# Patient Record
Sex: FEMALE | Race: WHITE | NOT HISPANIC OR LATINO | Employment: FULL TIME | URBAN - METROPOLITAN AREA
[De-identification: names, ages, dates, MRNs, and addresses within clinical notes are randomized per-mention and may not be internally consistent; named-entity substitution may affect disease eponyms.]

---

## 2019-09-13 ENCOUNTER — HOSPITAL ENCOUNTER (OUTPATIENT)
Dept: MEDSURG UNIT | Facility: HOSPITAL | Age: 34
End: 2019-09-15
Attending: INTERNAL MEDICINE | Admitting: INTERNAL MEDICINE

## 2019-09-13 LAB
ABS NEUT (OLG): 15.9 X10(3)/MCL (ref 1.5–6.9)
ALBUMIN SERPL-MCNC: 4 GM/DL (ref 3.4–5)
ALBUMIN/GLOB SERPL: 1 RATIO
ALP SERPL-CCNC: 92 UNIT/L (ref 30–113)
ALT SERPL-CCNC: 20 UNIT/L (ref 10–45)
AMPHET UR QL SCN: NORMAL
APPEARANCE, UA: CLEAR
APTT PPP: 27.1 SECOND(S) (ref 25–35)
AST SERPL-CCNC: 13 UNIT/L (ref 15–37)
B-HCG SERPL QL: NEGATIVE
BACTERIA SPEC CULT: NORMAL /HPF
BARBITURATE SCN PRESENT UR: NORMAL
BASOPHILS # BLD AUTO: 0 X10(3)/MCL (ref 0–0.1)
BASOPHILS NFR BLD AUTO: 0 % (ref 0–1)
BENZODIAZ UR QL SCN: NORMAL
BILIRUB SERPL-MCNC: 0.6 MG/DL (ref 0.1–0.9)
BILIRUB UR QL STRIP: NEGATIVE
BILIRUBIN DIRECT+TOT PNL SERPL-MCNC: 0.1 MG/DL (ref 0–0.3)
BILIRUBIN DIRECT+TOT PNL SERPL-MCNC: 0.5 MG/DL
BUN SERPL-MCNC: 11 MG/DL (ref 10–20)
CALCIUM SERPL-MCNC: 9.2 MG/DL (ref 8–10.5)
CANNABINOIDS UR QL SCN: NORMAL
CHLORIDE SERPL-SCNC: 101 MMOL/L (ref 100–108)
CO2 SERPL-SCNC: 25 MMOL/L (ref 21–35)
COCAINE UR QL SCN: NORMAL
COLOR UR: YELLOW
CREAT SERPL-MCNC: 0.8 MG/DL (ref 0.7–1.3)
EOSINOPHIL # BLD AUTO: 0 X10(3)/MCL (ref 0–0.6)
EOSINOPHIL NFR BLD AUTO: 0 % (ref 0–5)
ERYTHROCYTE [DISTWIDTH] IN BLOOD BY AUTOMATED COUNT: 11.9 % (ref 11.5–17)
GLOBULIN SER-MCNC: 4.1 GM/DL
GLUCOSE (UA): NEGATIVE
GLUCOSE SERPL-MCNC: 113 MG/DL (ref 75–116)
HCT VFR BLD AUTO: 41.9 % (ref 36–48)
HGB BLD-MCNC: 13.8 GM/DL (ref 12–16)
HGB UR QL STRIP: ABNORMAL
IMM GRANULOCYTES # BLD AUTO: 0.08 10*3/UL (ref 0–0.02)
IMM GRANULOCYTES NFR BLD AUTO: 0.4 % (ref 0–0.43)
INR PPP: 1 (ref 0–1.2)
KETONES UR QL STRIP: 15 MG/DL
LEUKOCYTE ESTERASE UR QL STRIP: NEGATIVE
LYMPHOCYTES # BLD AUTO: 1.3 X10(3)/MCL (ref 0.5–4.1)
LYMPHOCYTES NFR BLD AUTO: 7 % (ref 15–40)
MCH RBC QN AUTO: 30 PG (ref 27–34)
MCHC RBC AUTO-ENTMCNC: 33 GM/DL (ref 31–36)
MCV RBC AUTO: 92 FL (ref 80–99)
MDMA UR QL SCN: NORMAL
METHADONE UR QL SCN: NORMAL
MONOCYTES # BLD AUTO: 0.7 X10(3)/MCL (ref 0–1.1)
MONOCYTES NFR BLD AUTO: 4 % (ref 4–12)
NEUTROPHILS # BLD AUTO: 15.9 X10(3)/MCL (ref 1.5–6.9)
NEUTROPHILS NFR BLD AUTO: 88 % (ref 43–75)
NITRITE UR QL STRIP: NEGATIVE
OPIATES UR QL SCN: NORMAL
PCP UR QL: NORMAL
PH UR STRIP.AUTO: 6 [PH] (ref 5–8)
PH UR STRIP: 6 [PH]
PLATELET # BLD AUTO: 460 X10(3)/MCL (ref 140–400)
PMV BLD AUTO: 9.8 FL (ref 6.8–10)
POTASSIUM SERPL-SCNC: 3.9 MMOL/L (ref 3.6–5.2)
PROT SERPL-MCNC: 8.1 GM/DL (ref 6.4–8.2)
PROT UR QL STRIP: NEGATIVE
PROTHROMBIN TIME: 10.5 SECOND(S) (ref 9–12)
RBC # BLD AUTO: 4.54 X10(6)/MCL (ref 4.2–5.4)
RBC #/AREA URNS HPF: NORMAL /HPF
SODIUM SERPL-SCNC: 137 MMOL/L (ref 135–145)
SP GR UR STRIP: 1.02
SQUAMOUS EPITHELIAL, UA: NORMAL /LPF
TEMPERATURE, URINE (OHS): 25 DEGC (ref 20–25)
UROBILINOGEN UR STRIP-ACNC: 0.2 EU/DL
WBC # SPEC AUTO: 18 X10(3)/MCL (ref 4.5–11.5)
WBC #/AREA URNS HPF: NORMAL /[HPF]

## 2019-09-14 LAB
ABS NEUT (OLG): 8.8 X10(3)/MCL (ref 1.5–6.9)
BASOPHILS # BLD AUTO: 0 X10(3)/MCL (ref 0–0.1)
BASOPHILS NFR BLD AUTO: 0 % (ref 0–1)
BUN SERPL-MCNC: 9 MG/DL (ref 10–20)
CALCIUM SERPL-MCNC: 8.3 MG/DL (ref 8–10.5)
CHLORIDE SERPL-SCNC: 108 MMOL/L (ref 100–108)
CO2 SERPL-SCNC: 26 MMOL/L (ref 21–35)
CREAT SERPL-MCNC: 1.14 MG/DL (ref 0.7–1.3)
CREAT/UREA NIT SERPL: 8
EOSINOPHIL # BLD AUTO: 0.1 X10(3)/MCL (ref 0–0.6)
EOSINOPHIL NFR BLD AUTO: 0 % (ref 0–5)
ERYTHROCYTE [DISTWIDTH] IN BLOOD BY AUTOMATED COUNT: 12 % (ref 11.5–17)
GLUCOSE SERPL-MCNC: 95 MG/DL (ref 75–116)
HCT VFR BLD AUTO: 37.2 % (ref 36–48)
HGB BLD-MCNC: 12 GM/DL (ref 12–16)
IMM GRANULOCYTES # BLD AUTO: 0.04 10*3/UL (ref 0–0.02)
IMM GRANULOCYTES NFR BLD AUTO: 0.3 % (ref 0–0.43)
LYMPHOCYTES # BLD AUTO: 1.8 X10(3)/MCL (ref 0.5–4.1)
LYMPHOCYTES NFR BLD AUTO: 15 % (ref 15–40)
MAGNESIUM SERPL-MCNC: 1.9 MG/DL (ref 1.8–2.4)
MCH RBC QN AUTO: 30 PG (ref 27–34)
MCHC RBC AUTO-ENTMCNC: 32 GM/DL (ref 31–36)
MCV RBC AUTO: 95 FL (ref 80–99)
MONOCYTES # BLD AUTO: 1 X10(3)/MCL (ref 0–1.1)
MONOCYTES NFR BLD AUTO: 9 % (ref 4–12)
NEUTROPHILS # BLD AUTO: 8.8 X10(3)/MCL (ref 1.5–6.9)
NEUTROPHILS NFR BLD AUTO: 75 % (ref 43–75)
PLATELET # BLD AUTO: 347 X10(3)/MCL (ref 140–400)
PMV BLD AUTO: 10 FL (ref 6.8–10)
POTASSIUM SERPL-SCNC: 3.5 MMOL/L (ref 3.6–5.2)
RBC # BLD AUTO: 3.93 X10(6)/MCL (ref 4.2–5.4)
SODIUM SERPL-SCNC: 142 MMOL/L (ref 135–145)
WBC # SPEC AUTO: 11.7 X10(3)/MCL (ref 4.5–11.5)

## 2019-09-15 LAB
ABS NEUT (OLG): 6.1 X10(3)/MCL (ref 1.5–6.9)
BASOPHILS # BLD AUTO: 0.1 X10(3)/MCL (ref 0–0.1)
BASOPHILS NFR BLD AUTO: 1 % (ref 0–1)
EOSINOPHIL # BLD AUTO: 0.2 X10(3)/MCL (ref 0–0.6)
EOSINOPHIL NFR BLD AUTO: 2 % (ref 0–5)
ERYTHROCYTE [DISTWIDTH] IN BLOOD BY AUTOMATED COUNT: 12.1 % (ref 11.5–17)
HCT VFR BLD AUTO: 35.4 % (ref 36–48)
HGB BLD-MCNC: 11.3 GM/DL (ref 12–16)
IMM GRANULOCYTES # BLD AUTO: 0.03 10*3/UL (ref 0–0.02)
IMM GRANULOCYTES NFR BLD AUTO: 0.3 % (ref 0–0.43)
LYMPHOCYTES # BLD AUTO: 2.3 X10(3)/MCL (ref 0.5–4.1)
LYMPHOCYTES NFR BLD AUTO: 24 % (ref 15–40)
MCH RBC QN AUTO: 30 PG (ref 27–34)
MCHC RBC AUTO-ENTMCNC: 32 GM/DL (ref 31–36)
MCV RBC AUTO: 95 FL (ref 80–99)
MONOCYTES # BLD AUTO: 0.7 X10(3)/MCL (ref 0–1.1)
MONOCYTES NFR BLD AUTO: 8 % (ref 4–12)
NEUTROPHILS # BLD AUTO: 6.1 X10(3)/MCL (ref 1.5–6.9)
NEUTROPHILS NFR BLD AUTO: 65 % (ref 43–75)
PLATELET # BLD AUTO: 336 X10(3)/MCL (ref 140–400)
PMV BLD AUTO: 9.7 FL (ref 6.8–10)
RBC # BLD AUTO: 3.71 X10(6)/MCL (ref 4.2–5.4)
WBC # SPEC AUTO: 9.4 X10(3)/MCL (ref 4.5–11.5)

## 2021-06-07 LAB
ABS NEUT (OLG): 10.5 X10(3)/MCL (ref 1.5–6.9)
B-HCG SERPL QL: NEGATIVE
BUN SERPL-MCNC: 11 MG/DL (ref 7–18.7)
CALCIUM SERPL-MCNC: 9.3 MG/DL (ref 8.4–10.2)
CHLORIDE SERPL-SCNC: 106 MMOL/L (ref 98–107)
CO2 SERPL-SCNC: 27 MMOL/L (ref 22–29)
CREAT SERPL-MCNC: 0.66 MG/DL (ref 0.55–1.02)
CREAT/UREA NIT SERPL: 17
ERYTHROCYTE [DISTWIDTH] IN BLOOD BY AUTOMATED COUNT: 12.9 % (ref 11.5–17)
GLUCOSE SERPL-MCNC: 83 MG/DL (ref 74–100)
GROUP & RH: NORMAL
HCT VFR BLD AUTO: 37.3 % (ref 36–48)
HGB BLD-MCNC: 11.9 GM/DL (ref 12–16)
MCH RBC QN AUTO: 30 PG (ref 27–34)
MCHC RBC AUTO-ENTMCNC: 32 GM/DL (ref 31–36)
MCV RBC AUTO: 93 FL (ref 80–99)
PLATELET # BLD AUTO: 487 X10(3)/MCL (ref 140–400)
PMV BLD AUTO: 9.7 FL (ref 6.8–10)
POTASSIUM SERPL-SCNC: 4.3 MMOL/L (ref 3.5–5.1)
RBC # BLD AUTO: 4.02 X10(6)/MCL (ref 4.2–5.4)
SARS-COV-2 AG RESP QL IA.RAPID: NOT DETECTED
SODIUM SERPL-SCNC: 140 MMOL/L (ref 136–145)
WBC # SPEC AUTO: 13.4 X10(3)/MCL (ref 4.5–11.5)

## 2021-06-08 ENCOUNTER — HISTORICAL (OUTPATIENT)
Dept: ANESTHESIOLOGY | Facility: HOSPITAL | Age: 36
End: 2021-06-08

## 2021-06-08 LAB
ABS NEUT (OLG): 17.3 X10(3)/MCL (ref 1.5–6.9)
BASOPHILS # BLD AUTO: 0 X10(3)/MCL (ref 0–0.1)
BASOPHILS NFR BLD AUTO: 0 % (ref 0–1)
ERYTHROCYTE [DISTWIDTH] IN BLOOD BY AUTOMATED COUNT: 12.9 % (ref 11.5–17)
HCT VFR BLD AUTO: 32.3 % (ref 36–48)
HGB BLD-MCNC: 10.3 GM/DL (ref 12–16)
IMM GRANULOCYTES # BLD AUTO: 0.1 10*3/UL (ref 0–0.02)
IMM GRANULOCYTES NFR BLD AUTO: 0.5 % (ref 0–0.43)
LYMPHOCYTES # BLD AUTO: 0.4 X10(3)/MCL (ref 0.5–4.1)
LYMPHOCYTES NFR BLD AUTO: 2 % (ref 15–40)
MCH RBC QN AUTO: 30 PG (ref 27–34)
MCHC RBC AUTO-ENTMCNC: 32 GM/DL (ref 31–36)
MCV RBC AUTO: 94 FL (ref 80–99)
MONOCYTES # BLD AUTO: 0.3 X10(3)/MCL (ref 0–1.1)
MONOCYTES NFR BLD AUTO: 2 % (ref 4–12)
NEUTROPHILS # BLD AUTO: 17.3 X10(3)/MCL (ref 1.5–6.9)
NEUTROPHILS NFR BLD AUTO: 95 % (ref 43–75)
PLATELET # BLD AUTO: 389 X10(3)/MCL (ref 140–400)
PMV BLD AUTO: 9.5 FL (ref 6.8–10)
RBC # BLD AUTO: 3.45 X10(6)/MCL (ref 4.2–5.4)
WBC # SPEC AUTO: 18.2 X10(3)/MCL (ref 4.5–11.5)

## 2021-08-12 ENCOUNTER — HISTORICAL (OUTPATIENT)
Dept: LAB | Facility: HOSPITAL | Age: 36
End: 2021-08-12

## 2021-08-12 LAB — SARS-COV-2 AG RESP QL IA.RAPID: NOT DETECTED

## 2021-10-18 ENCOUNTER — HISTORICAL (OUTPATIENT)
Dept: ADMINISTRATIVE | Facility: HOSPITAL | Age: 36
End: 2021-10-18

## 2021-10-18 LAB
ABS NEUT (OLG): 6.23 X10(3)/MCL (ref 2.1–9.2)
ALBUMIN SERPL-MCNC: 4.1 GM/DL (ref 3.5–5)
ALBUMIN/GLOB SERPL: 1.4 RATIO (ref 1.1–2)
ALP SERPL-CCNC: 86 UNIT/L (ref 40–150)
ALT SERPL-CCNC: 20 UNIT/L (ref 0–55)
AST SERPL-CCNC: 17 UNIT/L (ref 5–34)
BASOPHILS # BLD AUTO: 0 X10(3)/MCL (ref 0–0.2)
BASOPHILS NFR BLD AUTO: 0.5 %
BILIRUB SERPL-MCNC: 0.2 MG/DL
BILIRUBIN DIRECT+TOT PNL SERPL-MCNC: 0.1 MG/DL (ref 0–0.5)
BILIRUBIN DIRECT+TOT PNL SERPL-MCNC: 0.1 MG/DL (ref 0–0.8)
BUN SERPL-MCNC: 12.5 MG/DL (ref 7–18.7)
CALCIUM SERPL-MCNC: 9.7 MG/DL (ref 8.7–10.5)
CANCER AG125 SERPL-ACNC: 22.1 UNIT/ML (ref 0–35)
CHLORIDE SERPL-SCNC: 109 MMOL/L (ref 98–107)
CO2 SERPL-SCNC: 25 MMOL/L (ref 22–29)
CREAT SERPL-MCNC: 0.79 MG/DL (ref 0.55–1.02)
EOSINOPHIL # BLD AUTO: 0.2 X10(3)/MCL (ref 0–0.9)
EOSINOPHIL NFR BLD AUTO: 2.3 %
ERYTHROCYTE [DISTWIDTH] IN BLOOD BY AUTOMATED COUNT: 12.2 % (ref 11.5–17)
GLOBULIN SER-MCNC: 2.9 GM/DL (ref 2.4–3.5)
GLUCOSE SERPL-MCNC: 125 MG/DL (ref 74–100)
HCT VFR BLD AUTO: 40.7 % (ref 37–47)
HGB BLD-MCNC: 12.9 GM/DL (ref 12–16)
LYMPHOCYTES # BLD AUTO: 1.6 X10(3)/MCL (ref 0.6–4.6)
LYMPHOCYTES NFR BLD AUTO: 18.6 %
MCH RBC QN AUTO: 29.7 PG (ref 27–31)
MCHC RBC AUTO-ENTMCNC: 31.7 GM/DL (ref 33–36)
MCV RBC AUTO: 93.8 FL (ref 80–94)
MONOCYTES # BLD AUTO: 0.4 X10(3)/MCL (ref 0.1–1.3)
MONOCYTES NFR BLD AUTO: 5.3 %
NEUTROPHILS # BLD AUTO: 6.2 X10(3)/MCL (ref 2.1–9.2)
NEUTROPHILS NFR BLD AUTO: 73.1 %
PLATELET # BLD AUTO: 361 X10(3)/MCL (ref 130–400)
PMV BLD AUTO: 10.3 FL (ref 9.4–12.4)
POTASSIUM SERPL-SCNC: 4 MMOL/L (ref 3.5–5.1)
PROT SERPL-MCNC: 7 GM/DL (ref 6.4–8.3)
RBC # BLD AUTO: 4.34 X10(6)/MCL (ref 4.2–5.4)
SODIUM SERPL-SCNC: 146 MMOL/L (ref 136–145)
WBC # SPEC AUTO: 8.5 X10(3)/MCL (ref 4.5–11.5)

## 2021-10-27 ENCOUNTER — HISTORICAL (OUTPATIENT)
Dept: INFUSION THERAPY | Facility: HOSPITAL | Age: 36
End: 2021-10-27

## 2021-11-03 ENCOUNTER — HISTORICAL (OUTPATIENT)
Dept: HEMATOLOGY/ONCOLOGY | Facility: CLINIC | Age: 36
End: 2021-11-03

## 2021-11-03 LAB
ABS NEUT (OLG): 10.09 X10(3)/MCL (ref 2.1–9.2)
ALBUMIN SERPL-MCNC: 4 GM/DL (ref 3.5–5)
ALBUMIN/GLOB SERPL: 1.5 RATIO (ref 1.1–2)
ALP SERPL-CCNC: 126 UNIT/L (ref 40–150)
ALT SERPL-CCNC: 46 UNIT/L (ref 0–55)
AST SERPL-CCNC: 22 UNIT/L (ref 5–34)
BASOPHILS # BLD AUTO: 0.1 X10(3)/MCL (ref 0–0.2)
BASOPHILS NFR BLD AUTO: 0.4 %
BILIRUB SERPL-MCNC: 0.3 MG/DL
BILIRUBIN DIRECT+TOT PNL SERPL-MCNC: 0.1 MG/DL (ref 0–0.5)
BILIRUBIN DIRECT+TOT PNL SERPL-MCNC: 0.2 MG/DL (ref 0–0.8)
BUN SERPL-MCNC: 14.4 MG/DL (ref 7–18.7)
CALCIUM SERPL-MCNC: 9.5 MG/DL (ref 8.7–10.5)
CHLORIDE SERPL-SCNC: 106 MMOL/L (ref 98–107)
CO2 SERPL-SCNC: 26 MMOL/L (ref 22–29)
CREAT SERPL-MCNC: 0.65 MG/DL (ref 0.55–1.02)
EOSINOPHIL # BLD AUTO: 0.3 X10(3)/MCL (ref 0–0.9)
EOSINOPHIL NFR BLD AUTO: 1.7 %
ERYTHROCYTE [DISTWIDTH] IN BLOOD BY AUTOMATED COUNT: 12.8 % (ref 11.5–17)
GLOBULIN SER-MCNC: 2.7 GM/DL (ref 2.4–3.5)
GLUCOSE SERPL-MCNC: 97 MG/DL (ref 74–100)
HCT VFR BLD AUTO: 40.6 % (ref 37–47)
HGB BLD-MCNC: 12.5 GM/DL (ref 12–16)
LYMPHOCYTES # BLD AUTO: 1.9 X10(3)/MCL (ref 0.6–4.6)
LYMPHOCYTES NFR BLD AUTO: 12 %
MCH RBC QN AUTO: 29.5 PG (ref 27–31)
MCHC RBC AUTO-ENTMCNC: 30.8 GM/DL (ref 33–36)
MCV RBC AUTO: 95.8 FL (ref 80–94)
MONOCYTES # BLD AUTO: 2.3 X10(3)/MCL (ref 0.1–1.3)
MONOCYTES NFR BLD AUTO: 14 %
NEUTROPHILS # BLD AUTO: 10.1 X10(3)/MCL (ref 2.1–9.2)
NEUTROPHILS NFR BLD AUTO: 62.6 %
PLATELET # BLD AUTO: 363 X10(3)/MCL (ref 130–400)
PMV BLD AUTO: 10.3 FL (ref 9.4–12.4)
POTASSIUM SERPL-SCNC: 4 MMOL/L (ref 3.5–5.1)
PROT SERPL-MCNC: 6.7 GM/DL (ref 6.4–8.3)
RBC # BLD AUTO: 4.24 X10(6)/MCL (ref 4.2–5.4)
SODIUM SERPL-SCNC: 143 MMOL/L (ref 136–145)
WBC # SPEC AUTO: 16.1 X10(3)/MCL (ref 4.5–11.5)

## 2021-11-10 ENCOUNTER — HISTORICAL (OUTPATIENT)
Dept: HEMATOLOGY/ONCOLOGY | Facility: CLINIC | Age: 36
End: 2021-11-10

## 2021-11-10 LAB
ABS NEUT (OLG): 7.77 X10(3)/MCL (ref 2.1–9.2)
ALBUMIN SERPL-MCNC: 3.9 GM/DL (ref 3.5–5)
ALBUMIN/GLOB SERPL: 1.4 RATIO (ref 1.1–2)
ALP SERPL-CCNC: 109 UNIT/L (ref 40–150)
ALT SERPL-CCNC: 41 UNIT/L (ref 0–55)
AST SERPL-CCNC: 23 UNIT/L (ref 5–34)
BASOPHILS # BLD AUTO: 0 X10(3)/MCL (ref 0–0.2)
BASOPHILS NFR BLD AUTO: 0.3 %
BILIRUB SERPL-MCNC: 0.4 MG/DL
BILIRUBIN DIRECT+TOT PNL SERPL-MCNC: 0.1 MG/DL (ref 0–0.5)
BILIRUBIN DIRECT+TOT PNL SERPL-MCNC: 0.3 MG/DL (ref 0–0.8)
BUN SERPL-MCNC: 10.1 MG/DL (ref 7–18.7)
CALCIUM SERPL-MCNC: 9.4 MG/DL (ref 8.7–10.5)
CHLORIDE SERPL-SCNC: 105 MMOL/L (ref 98–107)
CO2 SERPL-SCNC: 28 MMOL/L (ref 22–29)
CREAT SERPL-MCNC: 0.6 MG/DL (ref 0.55–1.02)
EOSINOPHIL # BLD AUTO: 0.2 X10(3)/MCL (ref 0–0.9)
EOSINOPHIL NFR BLD AUTO: 1.5 %
ERYTHROCYTE [DISTWIDTH] IN BLOOD BY AUTOMATED COUNT: 12.8 % (ref 11.5–17)
GLOBULIN SER-MCNC: 2.8 GM/DL (ref 2.4–3.5)
GLUCOSE SERPL-MCNC: 92 MG/DL (ref 74–100)
HCT VFR BLD AUTO: 37.7 % (ref 37–47)
HGB BLD-MCNC: 12.3 GM/DL (ref 12–16)
LYMPHOCYTES # BLD AUTO: 1.4 X10(3)/MCL (ref 0.6–4.6)
LYMPHOCYTES NFR BLD AUTO: 14.1 %
MCH RBC QN AUTO: 30.2 PG (ref 27–31)
MCHC RBC AUTO-ENTMCNC: 32.6 GM/DL (ref 33–36)
MCV RBC AUTO: 92.6 FL (ref 80–94)
MONOCYTES # BLD AUTO: 0.4 X10(3)/MCL (ref 0.1–1.3)
MONOCYTES NFR BLD AUTO: 3.9 %
NEUTROPHILS # BLD AUTO: 7.8 X10(3)/MCL (ref 2.1–9.2)
NEUTROPHILS NFR BLD AUTO: 76.4 %
PLATELET # BLD AUTO: 223 X10(3)/MCL (ref 130–400)
PMV BLD AUTO: 9.5 FL (ref 9.4–12.4)
POTASSIUM SERPL-SCNC: 4.4 MMOL/L (ref 3.5–5.1)
PROT SERPL-MCNC: 6.7 GM/DL (ref 6.4–8.3)
RBC # BLD AUTO: 4.07 X10(6)/MCL (ref 4.2–5.4)
SODIUM SERPL-SCNC: 141 MMOL/L (ref 136–145)
WBC # SPEC AUTO: 10.2 X10(3)/MCL (ref 4.5–11.5)

## 2021-11-16 ENCOUNTER — HISTORICAL (OUTPATIENT)
Dept: ADMINISTRATIVE | Facility: HOSPITAL | Age: 36
End: 2021-11-16

## 2021-11-16 LAB
ABS NEUT (OLG): 7.4 X10(3)/MCL (ref 2.1–9.2)
ALBUMIN SERPL-MCNC: 3.9 GM/DL (ref 3.5–5)
ALBUMIN/GLOB SERPL: 1.5 RATIO (ref 1.1–2)
ALP SERPL-CCNC: 90 UNIT/L (ref 40–150)
ALT SERPL-CCNC: 42 UNIT/L (ref 0–55)
AST SERPL-CCNC: 22 UNIT/L (ref 5–34)
BASOPHILS # BLD AUTO: 0 X10(3)/MCL (ref 0–0.2)
BASOPHILS NFR BLD AUTO: 0.4 %
BILIRUB SERPL-MCNC: 0.3 MG/DL
BILIRUBIN DIRECT+TOT PNL SERPL-MCNC: 0.1 MG/DL (ref 0–0.5)
BILIRUBIN DIRECT+TOT PNL SERPL-MCNC: 0.2 MG/DL (ref 0–0.8)
BUN SERPL-MCNC: 13.6 MG/DL (ref 7–18.7)
CALCIUM SERPL-MCNC: 9.3 MG/DL (ref 8.7–10.5)
CANCER AG125 SERPL-ACNC: 11 UNIT/ML (ref 0–35)
CHLORIDE SERPL-SCNC: 106 MMOL/L (ref 98–107)
CO2 SERPL-SCNC: 29 MMOL/L (ref 22–29)
CREAT SERPL-MCNC: 0.68 MG/DL (ref 0.55–1.02)
EOSINOPHIL # BLD AUTO: 0.1 X10(3)/MCL (ref 0–0.9)
EOSINOPHIL NFR BLD AUTO: 1.2 %
ERYTHROCYTE [DISTWIDTH] IN BLOOD BY AUTOMATED COUNT: 13.5 % (ref 11.5–17)
GLOBULIN SER-MCNC: 2.6 GM/DL (ref 2.4–3.5)
GLUCOSE SERPL-MCNC: 119 MG/DL (ref 74–100)
HCT VFR BLD AUTO: 36.6 % (ref 37–47)
HGB BLD-MCNC: 11.6 GM/DL (ref 12–16)
LYMPHOCYTES # BLD AUTO: 1.2 X10(3)/MCL (ref 0.6–4.6)
LYMPHOCYTES NFR BLD AUTO: 12.5 %
MCH RBC QN AUTO: 29.8 PG (ref 27–31)
MCHC RBC AUTO-ENTMCNC: 31.7 GM/DL (ref 33–36)
MCV RBC AUTO: 94.1 FL (ref 80–94)
MONOCYTES # BLD AUTO: 0.7 X10(3)/MCL (ref 0.1–1.3)
MONOCYTES NFR BLD AUTO: 7.5 %
NEUTROPHILS # BLD AUTO: 7.4 X10(3)/MCL (ref 2.1–9.2)
NEUTROPHILS NFR BLD AUTO: 78 %
PLATELET # BLD AUTO: 385 X10(3)/MCL (ref 130–400)
PMV BLD AUTO: 9.2 FL (ref 9.4–12.4)
POTASSIUM SERPL-SCNC: 4.4 MMOL/L (ref 3.5–5.1)
PROT SERPL-MCNC: 6.5 GM/DL (ref 6.4–8.3)
RBC # BLD AUTO: 3.89 X10(6)/MCL (ref 4.2–5.4)
SODIUM SERPL-SCNC: 144 MMOL/L (ref 136–145)
WBC # SPEC AUTO: 9.5 X10(3)/MCL (ref 4.5–11.5)

## 2021-11-17 ENCOUNTER — HISTORICAL (OUTPATIENT)
Dept: INFUSION THERAPY | Facility: HOSPITAL | Age: 36
End: 2021-11-17

## 2021-11-24 ENCOUNTER — HISTORICAL (OUTPATIENT)
Dept: HEMATOLOGY/ONCOLOGY | Facility: CLINIC | Age: 36
End: 2021-11-24

## 2021-11-24 LAB
ABS NEUT (OLG): 19.37 X10(3)/MCL (ref 2.1–9.2)
ALBUMIN SERPL-MCNC: 4.1 GM/DL (ref 3.5–5)
ALBUMIN/GLOB SERPL: 1.6 RATIO (ref 1.1–2)
ALP SERPL-CCNC: 193 UNIT/L (ref 40–150)
ALT SERPL-CCNC: 118 UNIT/L (ref 0–55)
AST SERPL-CCNC: 38 UNIT/L (ref 5–34)
BASOPHILS # BLD AUTO: 0 X10(3)/MCL (ref 0–0.2)
BASOPHILS NFR BLD AUTO: 0.2 %
BILIRUB SERPL-MCNC: 0.2 MG/DL
BILIRUBIN DIRECT+TOT PNL SERPL-MCNC: 0.1 MG/DL (ref 0–0.5)
BILIRUBIN DIRECT+TOT PNL SERPL-MCNC: 0.1 MG/DL (ref 0–0.8)
BUN SERPL-MCNC: 15.8 MG/DL (ref 7–18.7)
CALCIUM SERPL-MCNC: 9.8 MG/DL (ref 8.7–10.5)
CHLORIDE SERPL-SCNC: 105 MMOL/L (ref 98–107)
CO2 SERPL-SCNC: 27 MMOL/L (ref 22–29)
CREAT SERPL-MCNC: 0.63 MG/DL (ref 0.55–1.02)
EOSINOPHIL # BLD AUTO: 0.3 X10(3)/MCL (ref 0–0.9)
EOSINOPHIL NFR BLD AUTO: 1.1 %
ERYTHROCYTE [DISTWIDTH] IN BLOOD BY AUTOMATED COUNT: 13.6 % (ref 11.5–17)
GLOBULIN SER-MCNC: 2.6 GM/DL (ref 2.4–3.5)
GLUCOSE SERPL-MCNC: 85 MG/DL (ref 74–100)
HCT VFR BLD AUTO: 37.1 % (ref 37–47)
HGB BLD-MCNC: 11.8 GM/DL (ref 12–16)
LYMPHOCYTES # BLD AUTO: 2.3 X10(3)/MCL (ref 0.6–4.6)
LYMPHOCYTES NFR BLD AUTO: 8.2 %
MCH RBC QN AUTO: 30.1 PG (ref 27–31)
MCHC RBC AUTO-ENTMCNC: 31.8 GM/DL (ref 33–36)
MCV RBC AUTO: 94.6 FL (ref 80–94)
MONOCYTES # BLD AUTO: 3.2 X10(3)/MCL (ref 0.1–1.3)
MONOCYTES NFR BLD AUTO: 11.3 %
NEUTROPHILS # BLD AUTO: 19.4 X10(3)/MCL (ref 2.1–9.2)
NEUTROPHILS NFR BLD AUTO: 68.7 %
PLATELET # BLD AUTO: 321 X10(3)/MCL (ref 130–400)
PMV BLD AUTO: 9.9 FL (ref 9.4–12.4)
POTASSIUM SERPL-SCNC: 4.3 MMOL/L (ref 3.5–5.1)
PROT SERPL-MCNC: 6.7 GM/DL (ref 6.4–8.3)
RBC # BLD AUTO: 3.92 X10(6)/MCL (ref 4.2–5.4)
SODIUM SERPL-SCNC: 142 MMOL/L (ref 136–145)
WBC # SPEC AUTO: 28.2 X10(3)/MCL (ref 4.5–11.5)

## 2021-11-30 ENCOUNTER — HISTORICAL (OUTPATIENT)
Dept: HEMATOLOGY/ONCOLOGY | Facility: CLINIC | Age: 36
End: 2021-11-30

## 2021-11-30 LAB
ABS NEUT (OLG): 8.76 X10(3)/MCL (ref 2.1–9.2)
ALBUMIN SERPL-MCNC: 3.9 GM/DL (ref 3.5–5)
ALBUMIN/GLOB SERPL: 1.5 RATIO (ref 1.1–2)
ALP SERPL-CCNC: 127 UNIT/L (ref 40–150)
ALT SERPL-CCNC: 59 UNIT/L (ref 0–55)
AST SERPL-CCNC: 23 UNIT/L (ref 5–34)
BASOPHILS # BLD AUTO: 0.1 X10(3)/MCL (ref 0–0.2)
BASOPHILS NFR BLD AUTO: 0.6 %
BILIRUB SERPL-MCNC: 0.4 MG/DL
BILIRUBIN DIRECT+TOT PNL SERPL-MCNC: 0.1 MG/DL (ref 0–0.5)
BILIRUBIN DIRECT+TOT PNL SERPL-MCNC: 0.3 MG/DL (ref 0–0.8)
BUN SERPL-MCNC: 16.6 MG/DL (ref 7–18.7)
CALCIUM SERPL-MCNC: 9.3 MG/DL (ref 8.7–10.5)
CHLORIDE SERPL-SCNC: 110 MMOL/L (ref 98–107)
CO2 SERPL-SCNC: 25 MMOL/L (ref 22–29)
CREAT SERPL-MCNC: 0.6 MG/DL (ref 0.55–1.02)
EOSINOPHIL # BLD AUTO: 0.1 X10(3)/MCL (ref 0–0.9)
EOSINOPHIL NFR BLD AUTO: 1 %
ERYTHROCYTE [DISTWIDTH] IN BLOOD BY AUTOMATED COUNT: 14 % (ref 11.5–17)
GLOBULIN SER-MCNC: 2.6 GM/DL (ref 2.4–3.5)
GLUCOSE SERPL-MCNC: 105 MG/DL (ref 74–100)
HCT VFR BLD AUTO: 35.6 % (ref 37–47)
HGB BLD-MCNC: 11.4 GM/DL (ref 12–16)
LYMPHOCYTES # BLD AUTO: 1.3 X10(3)/MCL (ref 0.6–4.6)
LYMPHOCYTES NFR BLD AUTO: 11.6 %
MCH RBC QN AUTO: 30.2 PG (ref 27–31)
MCHC RBC AUTO-ENTMCNC: 32 GM/DL (ref 33–36)
MCV RBC AUTO: 94.2 FL (ref 80–94)
MONOCYTES # BLD AUTO: 0.4 X10(3)/MCL (ref 0.1–1.3)
MONOCYTES NFR BLD AUTO: 4.1 %
NEUTROPHILS # BLD AUTO: 8.8 X10(3)/MCL (ref 2.1–9.2)
NEUTROPHILS NFR BLD AUTO: 80.6 %
PLATELET # BLD AUTO: 194 X10(3)/MCL (ref 130–400)
PMV BLD AUTO: 9.5 FL (ref 9.4–12.4)
POTASSIUM SERPL-SCNC: 3.8 MMOL/L (ref 3.5–5.1)
PROT SERPL-MCNC: 6.5 GM/DL (ref 6.4–8.3)
RBC # BLD AUTO: 3.78 X10(6)/MCL (ref 4.2–5.4)
SODIUM SERPL-SCNC: 145 MMOL/L (ref 136–145)
WBC # SPEC AUTO: 10.9 X10(3)/MCL (ref 4.5–11.5)

## 2021-12-07 ENCOUNTER — HISTORICAL (OUTPATIENT)
Dept: ADMINISTRATIVE | Facility: HOSPITAL | Age: 36
End: 2021-12-07

## 2021-12-07 LAB
ABS NEUT (OLG): 6.06 X10(3)/MCL (ref 2.1–9.2)
ALBUMIN SERPL-MCNC: 4 GM/DL (ref 3.5–5)
ALBUMIN/GLOB SERPL: 1.5 RATIO (ref 1.1–2)
ALP SERPL-CCNC: 105 UNIT/L (ref 40–150)
ALT SERPL-CCNC: 43 UNIT/L (ref 0–55)
AST SERPL-CCNC: 23 UNIT/L (ref 5–34)
BASOPHILS # BLD AUTO: 0 X10(3)/MCL (ref 0–0.2)
BASOPHILS NFR BLD AUTO: 0.4 %
BILIRUB SERPL-MCNC: 0.5 MG/DL
BILIRUBIN DIRECT+TOT PNL SERPL-MCNC: 0.1 MG/DL (ref 0–0.5)
BILIRUBIN DIRECT+TOT PNL SERPL-MCNC: 0.4 MG/DL (ref 0–0.8)
BUN SERPL-MCNC: 12.9 MG/DL (ref 7–18.7)
CALCIUM SERPL-MCNC: 9.5 MG/DL (ref 8.7–10.5)
CANCER AG125 SERPL-ACNC: 10.4 UNIT/ML (ref 0–35)
CHLORIDE SERPL-SCNC: 110 MMOL/L (ref 98–107)
CO2 SERPL-SCNC: 28 MMOL/L (ref 22–29)
CREAT SERPL-MCNC: 0.69 MG/DL (ref 0.55–1.02)
EOSINOPHIL # BLD AUTO: 0.1 X10(3)/MCL (ref 0–0.9)
EOSINOPHIL NFR BLD AUTO: 0.8 %
ERYTHROCYTE [DISTWIDTH] IN BLOOD BY AUTOMATED COUNT: 14.4 % (ref 11.5–17)
GLOBULIN SER-MCNC: 2.7 GM/DL (ref 2.4–3.5)
GLUCOSE SERPL-MCNC: 100 MG/DL (ref 74–100)
HCT VFR BLD AUTO: 36 % (ref 37–47)
HGB BLD-MCNC: 11.7 GM/DL (ref 12–16)
LYMPHOCYTES # BLD AUTO: 1.1 X10(3)/MCL (ref 0.6–4.6)
LYMPHOCYTES NFR BLD AUTO: 14.2 %
MCH RBC QN AUTO: 30.8 PG (ref 27–31)
MCHC RBC AUTO-ENTMCNC: 32.5 GM/DL (ref 33–36)
MCV RBC AUTO: 94.7 FL (ref 80–94)
MONOCYTES # BLD AUTO: 0.6 X10(3)/MCL (ref 0.1–1.3)
MONOCYTES NFR BLD AUTO: 7.6 %
NEUTROPHILS # BLD AUTO: 6.1 X10(3)/MCL (ref 2.1–9.2)
NEUTROPHILS NFR BLD AUTO: 76.6 %
PLATELET # BLD AUTO: 338 X10(3)/MCL (ref 130–400)
PMV BLD AUTO: 9.1 FL (ref 9.4–12.4)
POTASSIUM SERPL-SCNC: 3.9 MMOL/L (ref 3.5–5.1)
PROT SERPL-MCNC: 6.7 GM/DL (ref 6.4–8.3)
RBC # BLD AUTO: 3.8 X10(6)/MCL (ref 4.2–5.4)
SODIUM SERPL-SCNC: 146 MMOL/L (ref 136–145)
WBC # SPEC AUTO: 7.9 X10(3)/MCL (ref 4.5–11.5)

## 2021-12-08 ENCOUNTER — HISTORICAL (OUTPATIENT)
Dept: INFUSION THERAPY | Facility: HOSPITAL | Age: 36
End: 2021-12-08

## 2021-12-15 ENCOUNTER — HISTORICAL (OUTPATIENT)
Dept: HEMATOLOGY/ONCOLOGY | Facility: CLINIC | Age: 36
End: 2021-12-15

## 2021-12-15 LAB
ABS NEUT (OLG): 8.64 X10(3)/MCL (ref 2.1–9.2)
ALBUMIN SERPL-MCNC: 4.1 GM/DL (ref 3.5–5)
ALBUMIN/GLOB SERPL: 1.5 RATIO (ref 1.1–2)
ALP SERPL-CCNC: 149 UNIT/L (ref 40–150)
ALT SERPL-CCNC: 107 UNIT/L (ref 0–55)
AST SERPL-CCNC: 41 UNIT/L (ref 5–34)
BASOPHILS # BLD AUTO: 0 X10(3)/MCL (ref 0–0.2)
BASOPHILS NFR BLD AUTO: 0.2 %
BILIRUB SERPL-MCNC: 0.3 MG/DL
BILIRUBIN DIRECT+TOT PNL SERPL-MCNC: 0.1 MG/DL (ref 0–0.8)
BILIRUBIN DIRECT+TOT PNL SERPL-MCNC: 0.2 MG/DL (ref 0–0.5)
BUN SERPL-MCNC: 11.9 MG/DL (ref 7–18.7)
CALCIUM SERPL-MCNC: 9.6 MG/DL (ref 8.7–10.5)
CHLORIDE SERPL-SCNC: 104 MMOL/L (ref 98–107)
CO2 SERPL-SCNC: 26 MMOL/L (ref 22–29)
CREAT SERPL-MCNC: 0.59 MG/DL (ref 0.55–1.02)
EOSINOPHIL # BLD AUTO: 0.2 X10(3)/MCL (ref 0–0.9)
EOSINOPHIL NFR BLD AUTO: 1.6 %
ERYTHROCYTE [DISTWIDTH] IN BLOOD BY AUTOMATED COUNT: 14.4 % (ref 11.5–17)
GLOBULIN SER-MCNC: 2.7 GM/DL (ref 2.4–3.5)
GLUCOSE SERPL-MCNC: 90 MG/DL (ref 74–100)
HCT VFR BLD AUTO: 36.4 % (ref 37–47)
HGB BLD-MCNC: 11.6 GM/DL (ref 12–16)
LYMPHOCYTES # BLD AUTO: 1.7 X10(3)/MCL (ref 0.6–4.6)
LYMPHOCYTES NFR BLD AUTO: 14.1 %
MCH RBC QN AUTO: 30.4 PG (ref 27–31)
MCHC RBC AUTO-ENTMCNC: 31.9 GM/DL (ref 33–36)
MCV RBC AUTO: 95.5 FL (ref 80–94)
MONOCYTES # BLD AUTO: 1.4 X10(3)/MCL (ref 0.1–1.3)
MONOCYTES NFR BLD AUTO: 11.6 %
NEUTROPHILS # BLD AUTO: 8.6 X10(3)/MCL (ref 2.1–9.2)
NEUTROPHILS NFR BLD AUTO: 71.1 %
PLATELET # BLD AUTO: 273 X10(3)/MCL (ref 130–400)
PMV BLD AUTO: 9.4 FL (ref 9.4–12.4)
POTASSIUM SERPL-SCNC: 4.1 MMOL/L (ref 3.5–5.1)
PROT SERPL-MCNC: 6.8 GM/DL (ref 6.4–8.3)
RBC # BLD AUTO: 3.81 X10(6)/MCL (ref 4.2–5.4)
SODIUM SERPL-SCNC: 141 MMOL/L (ref 136–145)
WBC # SPEC AUTO: 12.2 X10(3)/MCL (ref 4.5–11.5)

## 2021-12-22 ENCOUNTER — HISTORICAL (OUTPATIENT)
Dept: HEMATOLOGY/ONCOLOGY | Facility: CLINIC | Age: 36
End: 2021-12-22

## 2021-12-22 LAB
ABS NEUT (OLG): 7.34 X10(3)/MCL (ref 2.1–9.2)
ALBUMIN SERPL-MCNC: 3.8 GM/DL (ref 3.5–5)
ALBUMIN/GLOB SERPL: 1.4 RATIO (ref 1.1–2)
ALP SERPL-CCNC: 119 UNIT/L (ref 40–150)
ALT SERPL-CCNC: 53 UNIT/L (ref 0–55)
AST SERPL-CCNC: 26 UNIT/L (ref 5–34)
BASOPHILS # BLD AUTO: 0 X10(3)/MCL (ref 0–0.2)
BASOPHILS NFR BLD AUTO: 0.3 %
BILIRUB SERPL-MCNC: 0.3 MG/DL
BILIRUBIN DIRECT+TOT PNL SERPL-MCNC: 0.1 MG/DL (ref 0–0.5)
BILIRUBIN DIRECT+TOT PNL SERPL-MCNC: 0.2 MG/DL (ref 0–0.8)
BUN SERPL-MCNC: 14.4 MG/DL (ref 7–18.7)
CALCIUM SERPL-MCNC: 9.2 MG/DL (ref 8.7–10.5)
CHLORIDE SERPL-SCNC: 108 MMOL/L (ref 98–107)
CO2 SERPL-SCNC: 29 MMOL/L (ref 22–29)
CREAT SERPL-MCNC: 0.6 MG/DL (ref 0.55–1.02)
EOSINOPHIL # BLD AUTO: 0.1 X10(3)/MCL (ref 0–0.9)
EOSINOPHIL NFR BLD AUTO: 0.7 %
ERYTHROCYTE [DISTWIDTH] IN BLOOD BY AUTOMATED COUNT: 14.5 % (ref 11.5–17)
GLOBULIN SER-MCNC: 2.8 GM/DL (ref 2.4–3.5)
GLUCOSE SERPL-MCNC: 80 MG/DL (ref 74–100)
HCT VFR BLD AUTO: 34.9 % (ref 37–47)
HGB BLD-MCNC: 11.4 GM/DL (ref 12–16)
LYMPHOCYTES # BLD AUTO: 1 X10(3)/MCL (ref 0.6–4.6)
LYMPHOCYTES NFR BLD AUTO: 11.5 %
MCH RBC QN AUTO: 31.2 PG (ref 27–31)
MCHC RBC AUTO-ENTMCNC: 32.7 GM/DL (ref 33–36)
MCV RBC AUTO: 95.6 FL (ref 80–94)
MONOCYTES # BLD AUTO: 0.4 X10(3)/MCL (ref 0.1–1.3)
MONOCYTES NFR BLD AUTO: 4.7 %
NEUTROPHILS # BLD AUTO: 7.3 X10(3)/MCL (ref 2.1–9.2)
NEUTROPHILS NFR BLD AUTO: 82 %
PLATELET # BLD AUTO: 212 X10(3)/MCL (ref 130–400)
PMV BLD AUTO: 9.1 FL (ref 9.4–12.4)
POTASSIUM SERPL-SCNC: 3.9 MMOL/L (ref 3.5–5.1)
PROT SERPL-MCNC: 6.6 GM/DL (ref 6.4–8.3)
RBC # BLD AUTO: 3.65 X10(6)/MCL (ref 4.2–5.4)
SODIUM SERPL-SCNC: 143 MMOL/L (ref 136–145)
WBC # SPEC AUTO: 9 X10(3)/MCL (ref 4.5–11.5)

## 2021-12-28 ENCOUNTER — HISTORICAL (OUTPATIENT)
Dept: ADMINISTRATIVE | Facility: HOSPITAL | Age: 36
End: 2021-12-28

## 2021-12-28 LAB
ABS NEUT (OLG): 4.87 X10(3)/MCL (ref 2.1–9.2)
ALBUMIN SERPL-MCNC: 4 GM/DL (ref 3.5–5)
ALBUMIN/GLOB SERPL: 1.3 RATIO (ref 1.1–2)
ALP SERPL-CCNC: 113 UNIT/L (ref 40–150)
ALT SERPL-CCNC: 46 UNIT/L (ref 0–55)
AST SERPL-CCNC: 25 UNIT/L (ref 5–34)
BASOPHILS # BLD AUTO: 0 X10(3)/MCL (ref 0–0.2)
BASOPHILS NFR BLD AUTO: 0.4 %
BILIRUB SERPL-MCNC: 0.4 MG/DL
BILIRUBIN DIRECT+TOT PNL SERPL-MCNC: 0.1 MG/DL (ref 0–0.5)
BILIRUBIN DIRECT+TOT PNL SERPL-MCNC: 0.3 MG/DL (ref 0–0.8)
BUN SERPL-MCNC: 11.9 MG/DL (ref 7–18.7)
CALCIUM SERPL-MCNC: 9.7 MG/DL (ref 8.7–10.5)
CHLORIDE SERPL-SCNC: 107 MMOL/L (ref 98–107)
CO2 SERPL-SCNC: 28 MMOL/L (ref 22–29)
CREAT SERPL-MCNC: 0.6 MG/DL (ref 0.55–1.02)
EOSINOPHIL # BLD AUTO: 0 X10(3)/MCL (ref 0–0.9)
EOSINOPHIL NFR BLD AUTO: 0.4 %
ERYTHROCYTE [DISTWIDTH] IN BLOOD BY AUTOMATED COUNT: 14.7 % (ref 11.5–17)
GLOBULIN SER-MCNC: 3 GM/DL (ref 2.4–3.5)
GLUCOSE SERPL-MCNC: 91 MG/DL (ref 74–100)
HCT VFR BLD AUTO: 35.5 % (ref 37–47)
HGB BLD-MCNC: 11.6 GM/DL (ref 12–16)
LYMPHOCYTES # BLD AUTO: 1.1 X10(3)/MCL (ref 0.6–4.6)
LYMPHOCYTES NFR BLD AUTO: 16.9 %
MCH RBC QN AUTO: 31.1 PG (ref 27–31)
MCHC RBC AUTO-ENTMCNC: 32.7 GM/DL (ref 33–36)
MCV RBC AUTO: 95.2 FL (ref 80–94)
MONOCYTES # BLD AUTO: 0.6 X10(3)/MCL (ref 0.1–1.3)
MONOCYTES NFR BLD AUTO: 9.6 %
NEUTROPHILS # BLD AUTO: 4.9 X10(3)/MCL (ref 2.1–9.2)
NEUTROPHILS NFR BLD AUTO: 72.4 %
PLATELET # BLD AUTO: 316 X10(3)/MCL (ref 130–400)
PMV BLD AUTO: 9.1 FL (ref 9.4–12.4)
POTASSIUM SERPL-SCNC: 3.9 MMOL/L (ref 3.5–5.1)
PROT SERPL-MCNC: 7 GM/DL (ref 6.4–8.3)
RBC # BLD AUTO: 3.73 X10(6)/MCL (ref 4.2–5.4)
SODIUM SERPL-SCNC: 144 MMOL/L (ref 136–145)
WBC # SPEC AUTO: 6.7 X10(3)/MCL (ref 4.5–11.5)

## 2021-12-29 ENCOUNTER — HISTORICAL (OUTPATIENT)
Dept: INFUSION THERAPY | Facility: HOSPITAL | Age: 36
End: 2021-12-29

## 2022-01-05 ENCOUNTER — HISTORICAL (OUTPATIENT)
Dept: HEMATOLOGY/ONCOLOGY | Facility: CLINIC | Age: 37
End: 2022-01-05

## 2022-01-05 LAB
ABS NEUT (OLG): 4.94 X10(3)/MCL (ref 2.1–9.2)
ALBUMIN SERPL-MCNC: 4.1 GM/DL (ref 3.5–5)
ALBUMIN/GLOB SERPL: 1.5 RATIO (ref 1.1–2)
ALP SERPL-CCNC: 128 UNIT/L (ref 40–150)
ALT SERPL-CCNC: 101 UNIT/L (ref 0–55)
AST SERPL-CCNC: 39 UNIT/L (ref 5–34)
BASOPHILS # BLD AUTO: 0 X10(3)/MCL (ref 0–0.2)
BASOPHILS NFR BLD AUTO: 0.1 %
BILIRUB SERPL-MCNC: 0.3 MG/DL
BILIRUBIN DIRECT+TOT PNL SERPL-MCNC: 0.1 MG/DL (ref 0–0.5)
BILIRUBIN DIRECT+TOT PNL SERPL-MCNC: 0.2 MG/DL (ref 0–0.8)
BUN SERPL-MCNC: 14.2 MG/DL (ref 7–18.7)
CALCIUM SERPL-MCNC: 9.9 MG/DL (ref 8.7–10.5)
CHLORIDE SERPL-SCNC: 107 MMOL/L (ref 98–107)
CO2 SERPL-SCNC: 25 MMOL/L (ref 22–29)
CREAT SERPL-MCNC: 0.66 MG/DL (ref 0.55–1.02)
EOSINOPHIL # BLD AUTO: 0.1 X10(3)/MCL (ref 0–0.9)
EOSINOPHIL NFR BLD AUTO: 1.6 %
ERYTHROCYTE [DISTWIDTH] IN BLOOD BY AUTOMATED COUNT: 14.5 % (ref 11.5–17)
GLOBULIN SER-MCNC: 2.8 GM/DL (ref 2.4–3.5)
GLUCOSE SERPL-MCNC: 99 MG/DL (ref 74–100)
HCT VFR BLD AUTO: 35.7 % (ref 37–47)
HGB BLD-MCNC: 11.6 GM/DL (ref 12–16)
LYMPHOCYTES # BLD AUTO: 1.7 X10(3)/MCL (ref 0.6–4.6)
LYMPHOCYTES NFR BLD AUTO: 22.4 %
MCH RBC QN AUTO: 31.1 PG (ref 27–31)
MCHC RBC AUTO-ENTMCNC: 32.5 GM/DL (ref 33–36)
MCV RBC AUTO: 95.7 FL (ref 80–94)
MONOCYTES # BLD AUTO: 0.7 X10(3)/MCL (ref 0.1–1.3)
MONOCYTES NFR BLD AUTO: 9.2 %
NEUTROPHILS # BLD AUTO: 4.9 X10(3)/MCL (ref 2.1–9.2)
NEUTROPHILS NFR BLD AUTO: 64.7 %
PLATELET # BLD AUTO: 344 X10(3)/MCL (ref 130–400)
PMV BLD AUTO: 9.3 FL (ref 9.4–12.4)
POTASSIUM SERPL-SCNC: 4.6 MMOL/L (ref 3.5–5.1)
PROT SERPL-MCNC: 6.9 GM/DL (ref 6.4–8.3)
RBC # BLD AUTO: 3.73 X10(6)/MCL (ref 4.2–5.4)
SODIUM SERPL-SCNC: 145 MMOL/L (ref 136–145)
WBC # SPEC AUTO: 7.6 X10(3)/MCL (ref 4.5–11.5)

## 2022-01-12 ENCOUNTER — HISTORICAL (OUTPATIENT)
Dept: HEMATOLOGY/ONCOLOGY | Facility: CLINIC | Age: 37
End: 2022-01-12

## 2022-01-12 LAB
ABS NEUT (OLG): 6.66 X10(3)/MCL (ref 2.1–9.2)
ALBUMIN SERPL-MCNC: 4.1 GM/DL (ref 3.5–5)
ALBUMIN/GLOB SERPL: 1.3 RATIO (ref 1.1–2)
ALP SERPL-CCNC: 114 UNIT/L (ref 40–150)
ALT SERPL-CCNC: 67 UNIT/L (ref 0–55)
AST SERPL-CCNC: 39 UNIT/L (ref 5–34)
BASOPHILS # BLD AUTO: 0 X10(3)/MCL (ref 0–0.2)
BASOPHILS NFR BLD AUTO: 0.4 %
BILIRUB SERPL-MCNC: 0.4 MG/DL
BILIRUBIN DIRECT+TOT PNL SERPL-MCNC: 0.1 MG/DL (ref 0–0.5)
BILIRUBIN DIRECT+TOT PNL SERPL-MCNC: 0.3 MG/DL (ref 0–0.8)
BUN SERPL-MCNC: 12.7 MG/DL (ref 7–18.7)
CALCIUM SERPL-MCNC: 9.7 MG/DL (ref 8.7–10.5)
CANCER AG125 SERPL-ACNC: 11.7 UNIT/ML (ref 0–35)
CHLORIDE SERPL-SCNC: 104 MMOL/L (ref 98–107)
CO2 SERPL-SCNC: 26 MMOL/L (ref 22–29)
CREAT SERPL-MCNC: 0.64 MG/DL (ref 0.55–1.02)
EOSINOPHIL # BLD AUTO: 0 X10(3)/MCL (ref 0–0.9)
EOSINOPHIL NFR BLD AUTO: 0.5 %
ERYTHROCYTE [DISTWIDTH] IN BLOOD BY AUTOMATED COUNT: 14.9 % (ref 11.5–17)
GLOBULIN SER-MCNC: 3.2 GM/DL (ref 2.4–3.5)
GLUCOSE SERPL-MCNC: 87 MG/DL (ref 74–100)
HCT VFR BLD AUTO: 37.6 % (ref 37–47)
HGB BLD-MCNC: 12.2 GM/DL (ref 12–16)
LYMPHOCYTES # BLD AUTO: 1.2 X10(3)/MCL (ref 0.6–4.6)
LYMPHOCYTES NFR BLD AUTO: 14.5 %
MCH RBC QN AUTO: 31.5 PG (ref 27–31)
MCHC RBC AUTO-ENTMCNC: 32.4 GM/DL (ref 33–36)
MCV RBC AUTO: 97.2 FL (ref 80–94)
MONOCYTES # BLD AUTO: 0.6 X10(3)/MCL (ref 0.1–1.3)
MONOCYTES NFR BLD AUTO: 6.5 %
NEUTROPHILS # BLD AUTO: 6.7 X10(3)/MCL (ref 2.1–9.2)
NEUTROPHILS NFR BLD AUTO: 77.6 %
PLATELET # BLD AUTO: 276 X10(3)/MCL (ref 130–400)
PMV BLD AUTO: 9.2 FL (ref 9.4–12.4)
POTASSIUM SERPL-SCNC: 4.3 MMOL/L (ref 3.5–5.1)
PROT SERPL-MCNC: 7.3 GM/DL (ref 6.4–8.3)
RBC # BLD AUTO: 3.87 X10(6)/MCL (ref 4.2–5.4)
SODIUM SERPL-SCNC: 143 MMOL/L (ref 136–145)
TSH SERPL-ACNC: 0.87 UIU/ML (ref 0.35–4.94)
WBC # SPEC AUTO: 8.6 X10(3)/MCL (ref 4.5–11.5)

## 2022-01-19 ENCOUNTER — HISTORICAL (OUTPATIENT)
Dept: ADMINISTRATIVE | Facility: HOSPITAL | Age: 37
End: 2022-01-19

## 2022-01-19 LAB
ABS NEUT (OLG): 5.56 X10(3)/MCL (ref 2.1–9.2)
ALBUMIN SERPL-MCNC: 4 GM/DL (ref 3.5–5)
ALBUMIN/GLOB SERPL: 1.3 RATIO (ref 1.1–2)
ALP SERPL-CCNC: 108 UNIT/L (ref 40–150)
ALT SERPL-CCNC: 50 UNIT/L (ref 0–55)
AST SERPL-CCNC: 26 UNIT/L (ref 5–34)
BASOPHILS # BLD AUTO: 0 X10(3)/MCL (ref 0–0.2)
BASOPHILS NFR BLD AUTO: 0.6 %
BILIRUB SERPL-MCNC: 0.5 MG/DL
BILIRUBIN DIRECT+TOT PNL SERPL-MCNC: 0.2 MG/DL (ref 0–0.5)
BILIRUBIN DIRECT+TOT PNL SERPL-MCNC: 0.3 MG/DL (ref 0–0.8)
BUN SERPL-MCNC: 11.9 MG/DL (ref 7–18.7)
CALCIUM SERPL-MCNC: 9.7 MG/DL (ref 8.7–10.5)
CHLORIDE SERPL-SCNC: 105 MMOL/L (ref 98–107)
CO2 SERPL-SCNC: 29 MMOL/L (ref 22–29)
CREAT SERPL-MCNC: 0.63 MG/DL (ref 0.55–1.02)
EOSINOPHIL # BLD AUTO: 0.1 X10(3)/MCL (ref 0–0.9)
EOSINOPHIL NFR BLD AUTO: 1 %
ERYTHROCYTE [DISTWIDTH] IN BLOOD BY AUTOMATED COUNT: 14.4 % (ref 11.5–17)
GLOBULIN SER-MCNC: 3 GM/DL (ref 2.4–3.5)
GLUCOSE SERPL-MCNC: 93 MG/DL (ref 74–100)
HCT VFR BLD AUTO: 36.2 % (ref 37–47)
HGB BLD-MCNC: 12 GM/DL (ref 12–16)
LYMPHOCYTES # BLD AUTO: 1.3 X10(3)/MCL (ref 0.6–4.6)
LYMPHOCYTES NFR BLD AUTO: 17 %
MCH RBC QN AUTO: 32.1 PG (ref 27–31)
MCHC RBC AUTO-ENTMCNC: 33.1 GM/DL (ref 33–36)
MCV RBC AUTO: 96.8 FL (ref 80–94)
MONOCYTES # BLD AUTO: 0.8 X10(3)/MCL (ref 0.1–1.3)
MONOCYTES NFR BLD AUTO: 9.6 %
NEUTROPHILS # BLD AUTO: 5.6 X10(3)/MCL (ref 2.1–9.2)
NEUTROPHILS NFR BLD AUTO: 71.3 %
PLATELET # BLD AUTO: 302 X10(3)/MCL (ref 130–400)
PMV BLD AUTO: 9.1 FL (ref 9.4–12.4)
POTASSIUM SERPL-SCNC: 3.9 MMOL/L (ref 3.5–5.1)
PROT SERPL-MCNC: 7 GM/DL (ref 6.4–8.3)
RBC # BLD AUTO: 3.74 X10(6)/MCL (ref 4.2–5.4)
SODIUM SERPL-SCNC: 143 MMOL/L (ref 136–145)
WBC # SPEC AUTO: 7.8 X10(3)/MCL (ref 4.5–11.5)

## 2022-01-26 ENCOUNTER — HISTORICAL (OUTPATIENT)
Dept: INFUSION THERAPY | Facility: HOSPITAL | Age: 37
End: 2022-01-26

## 2022-01-26 LAB
ANION GAP SERPL CALC-SCNC: 17 MMOL/L
BUN SERPL-MCNC: 10 MG/DL (ref 8–26)
CHLORIDE SERPL-SCNC: 105 MMOL/L (ref 98–109)
CREAT SERPL-MCNC: 0.5 MG/DL (ref 0.6–1.3)
EST CREAT CLEARANCE SER (OHS): 128.63 ML/MIN
GLUCOSE SERPL-MCNC: 97 MG/DL (ref 70–105)
HCT VFR BLD CALC: 35 % (ref 38–51)
HGB BLD-MCNC: 11.9 MG/DL (ref 12–17)
POC IONIZED CALCIUM: 1.26 MMOL/L (ref 1.12–1.32)
POC TCO2: 26 MMOL/L (ref 24–29)
POTASSIUM BLD-SCNC: 3.6 MMOL/L (ref 3.5–4.9)
SODIUM BLD-SCNC: 144 MMOL/L (ref 138–146)

## 2022-02-02 ENCOUNTER — HISTORICAL (OUTPATIENT)
Dept: HEMATOLOGY/ONCOLOGY | Facility: CLINIC | Age: 37
End: 2022-02-02

## 2022-02-02 LAB
ABS NEUT (OLG): 7.12 (ref 2.1–9.2)
ALBUMIN SERPL-MCNC: 4 G/DL (ref 3.5–5)
ALBUMIN/GLOB SERPL: 1.5 {RATIO} (ref 1.1–2)
ALP SERPL-CCNC: 135 U/L (ref 40–150)
ALT SERPL-CCNC: 70 U/L (ref 0–55)
AST SERPL-CCNC: 28 U/L (ref 5–34)
BASOPHILS # BLD AUTO: 0 10*3/UL (ref 0–0.2)
BASOPHILS NFR BLD AUTO: 0.3 %
BILIRUB SERPL-MCNC: 0.4 MG/DL
BILIRUBIN DIRECT+TOT PNL SERPL-MCNC: 0.2 (ref 0–0.5)
BILIRUBIN DIRECT+TOT PNL SERPL-MCNC: 0.2 (ref 0–0.8)
BUN SERPL-MCNC: 13.8 MG/DL (ref 7–18.7)
CALCIUM SERPL-MCNC: 9.4 MG/DL (ref 8.7–10.5)
CHLORIDE SERPL-SCNC: 106 MMOL/L (ref 98–107)
CO2 SERPL-SCNC: 26 MMOL/L (ref 22–29)
CREAT SERPL-MCNC: 0.58 MG/DL (ref 0.55–1.02)
EOSINOPHIL # BLD AUTO: 0.1 10*3/UL (ref 0–0.9)
EOSINOPHIL NFR BLD AUTO: 1.4 %
ERYTHROCYTE [DISTWIDTH] IN BLOOD BY AUTOMATED COUNT: 13.6 % (ref 11.5–17)
GLOBULIN SER-MCNC: 2.7 G/DL (ref 2.4–3.5)
GLUCOSE SERPL-MCNC: 100 MG/DL (ref 74–100)
HCT VFR BLD AUTO: 35.3 % (ref 37–47)
HEMOLYSIS INTERF INDEX SERPL-ACNC: 0
HGB BLD-MCNC: 11.5 G/DL (ref 12–16)
ICTERIC INTERF INDEX SERPL-ACNC: 0
LIPEMIC INTERF INDEX SERPL-ACNC: 4
LYMPHOCYTES # BLD AUTO: 1.2 10*3/UL (ref 0.6–4.6)
LYMPHOCYTES NFR BLD AUTO: 12.4 %
MANUAL DIFF? (OHS): NO
MCH RBC QN AUTO: 31.8 PG (ref 27–31)
MCHC RBC AUTO-ENTMCNC: 32.6 G/DL (ref 33–36)
MCV RBC AUTO: 97.5 FL (ref 80–94)
MONOCYTES # BLD AUTO: 0.9 10*3/UL (ref 0.1–1.3)
MONOCYTES NFR BLD AUTO: 9.4 %
NEUTROPHILS # BLD AUTO: 7.1 10*3/UL (ref 2.1–9.2)
NEUTROPHILS NFR BLD AUTO: 75.8 %
PLATELET # BLD AUTO: 235 10*3/UL (ref 130–400)
PMV BLD AUTO: 9.3 FL (ref 9.4–12.4)
POTASSIUM SERPL-SCNC: 4 MMOL/L (ref 3.5–5.1)
PROT SERPL-MCNC: 6.7 G/DL (ref 6.4–8.3)
RBC # BLD AUTO: 3.62 10*6/UL (ref 4.2–5.4)
SODIUM SERPL-SCNC: 141 MMOL/L (ref 136–145)
WBC # SPEC AUTO: 9.4 10*3/UL (ref 4.5–11.5)

## 2022-02-09 ENCOUNTER — HISTORICAL (OUTPATIENT)
Dept: HEMATOLOGY/ONCOLOGY | Facility: CLINIC | Age: 37
End: 2022-02-09

## 2022-02-09 LAB
ABS NEUT (OLG): 4.01 (ref 2.1–9.2)
ALBUMIN SERPL-MCNC: 4 G/DL (ref 3.5–5)
ALBUMIN/GLOB SERPL: 1.4 {RATIO} (ref 1.1–2)
ALP SERPL-CCNC: 115 U/L (ref 40–150)
ALT SERPL-CCNC: 44 U/L (ref 0–55)
AST SERPL-CCNC: 22 U/L (ref 5–34)
BASOPHILS # BLD AUTO: 0 10*3/UL (ref 0–0.2)
BASOPHILS NFR BLD AUTO: 0.2 %
BILIRUB SERPL-MCNC: 0.5 MG/DL
BILIRUBIN DIRECT+TOT PNL SERPL-MCNC: 0.2 (ref 0–0.5)
BILIRUBIN DIRECT+TOT PNL SERPL-MCNC: 0.3 (ref 0–0.8)
BUN SERPL-MCNC: 13 MG/DL (ref 7–18.7)
CALCIUM SERPL-MCNC: 9.4 MG/DL (ref 8.7–10.5)
CHLORIDE SERPL-SCNC: 106 MMOL/L (ref 98–107)
CO2 SERPL-SCNC: 24 MMOL/L (ref 22–29)
CREAT SERPL-MCNC: 0.69 MG/DL (ref 0.55–1.02)
EOSINOPHIL # BLD AUTO: 0.1 10*3/UL (ref 0–0.9)
EOSINOPHIL NFR BLD AUTO: 1.2 %
ERYTHROCYTE [DISTWIDTH] IN BLOOD BY AUTOMATED COUNT: 13.6 % (ref 11.5–17)
GLOBULIN SER-MCNC: 2.8 G/DL (ref 2.4–3.5)
GLUCOSE SERPL-MCNC: 98 MG/DL (ref 74–100)
HCT VFR BLD AUTO: 35.7 % (ref 37–47)
HEMOLYSIS INTERF INDEX SERPL-ACNC: 4
HGB BLD-MCNC: 11.4 G/DL (ref 12–16)
ICTERIC INTERF INDEX SERPL-ACNC: 0
LIPEMIC INTERF INDEX SERPL-ACNC: 2
LYMPHOCYTES # BLD AUTO: 1.1 10*3/UL (ref 0.6–4.6)
LYMPHOCYTES NFR BLD AUTO: 20.1 %
MANUAL DIFF? (OHS): NO
MCH RBC QN AUTO: 31.4 PG (ref 27–31)
MCHC RBC AUTO-ENTMCNC: 31.9 G/DL (ref 33–36)
MCV RBC AUTO: 98.3 FL (ref 80–94)
MONOCYTES # BLD AUTO: 0.4 10*3/UL (ref 0.1–1.3)
MONOCYTES NFR BLD AUTO: 7.1 %
NEUTROPHILS # BLD AUTO: 4 10*3/UL (ref 2.1–9.2)
NEUTROPHILS NFR BLD AUTO: 70.7 %
PLATELET # BLD AUTO: 211 10*3/UL (ref 130–400)
PMV BLD AUTO: 9.1 FL (ref 9.4–12.4)
POTASSIUM SERPL-SCNC: 3.9 MMOL/L (ref 3.5–5.1)
PROT SERPL-MCNC: 6.8 G/DL (ref 6.4–8.3)
RBC # BLD AUTO: 3.63 10*6/UL (ref 4.2–5.4)
SODIUM SERPL-SCNC: 141 MMOL/L (ref 136–145)
WBC # SPEC AUTO: 5.7 10*3/UL (ref 4.5–11.5)

## 2022-02-16 ENCOUNTER — HISTORICAL (OUTPATIENT)
Dept: INFUSION THERAPY | Facility: HOSPITAL | Age: 37
End: 2022-02-16

## 2022-02-16 LAB
ABS NEUT (OLG): 3.75 (ref 2.1–9.2)
ALBUMIN SERPL-MCNC: 4 G/DL (ref 3.5–5)
ALBUMIN/GLOB SERPL: 1.3 {RATIO} (ref 1.1–2)
ALP SERPL-CCNC: 104 U/L (ref 40–150)
ALT SERPL-CCNC: 34 U/L (ref 0–55)
AST SERPL-CCNC: 21 U/L (ref 5–34)
BASOPHILS # BLD AUTO: 0 10*3/UL (ref 0–0.2)
BASOPHILS NFR BLD AUTO: 0.2 %
BILIRUB SERPL-MCNC: 0.4 MG/DL
BILIRUBIN DIRECT+TOT PNL SERPL-MCNC: 0.1 (ref 0–0.5)
BILIRUBIN DIRECT+TOT PNL SERPL-MCNC: 0.3 (ref 0–0.8)
BUN SERPL-MCNC: 12.5 MG/DL (ref 7–18.7)
CALCIUM SERPL-MCNC: 9.6 MG/DL (ref 8.7–10.5)
CHLORIDE SERPL-SCNC: 108 MMOL/L (ref 98–107)
CO2 SERPL-SCNC: 28 MMOL/L (ref 22–29)
CREAT SERPL-MCNC: 0.72 MG/DL (ref 0.55–1.02)
EOSINOPHIL # BLD AUTO: 0 10*3/UL (ref 0–0.9)
EOSINOPHIL NFR BLD AUTO: 0.6 %
ERYTHROCYTE [DISTWIDTH] IN BLOOD BY AUTOMATED COUNT: 13.5 % (ref 11.5–17)
GLOBULIN SER-MCNC: 3 G/DL (ref 2.4–3.5)
GLUCOSE SERPL-MCNC: 98 MG/DL (ref 74–100)
HCT VFR BLD AUTO: 35.7 % (ref 37–47)
HEMOLYSIS INTERF INDEX SERPL-ACNC: 2
HGB BLD-MCNC: 11.6 G/DL (ref 12–16)
ICTERIC INTERF INDEX SERPL-ACNC: 0
LIPEMIC INTERF INDEX SERPL-ACNC: 23
LYMPHOCYTES # BLD AUTO: 0.9 10*3/UL (ref 0.6–4.6)
LYMPHOCYTES NFR BLD AUTO: 17.2 %
MANUAL DIFF? (OHS): NO
MCH RBC QN AUTO: 32 PG (ref 27–31)
MCHC RBC AUTO-ENTMCNC: 32.5 G/DL (ref 33–36)
MCV RBC AUTO: 98.6 FL (ref 80–94)
MONOCYTES # BLD AUTO: 0.5 10*3/UL (ref 0.1–1.3)
MONOCYTES NFR BLD AUTO: 9.1 %
NEUTROPHILS # BLD AUTO: 3.8 10*3/UL (ref 2.1–9.2)
NEUTROPHILS NFR BLD AUTO: 72.7 %
PLATELET # BLD AUTO: 296 10*3/UL (ref 130–400)
PMV BLD AUTO: 9 FL (ref 9.4–12.4)
POTASSIUM SERPL-SCNC: 3.8 MMOL/L (ref 3.5–5.1)
PROT SERPL-MCNC: 7 G/DL (ref 6.4–8.3)
RBC # BLD AUTO: 3.62 10*6/UL (ref 4.2–5.4)
SODIUM SERPL-SCNC: 145 MMOL/L (ref 136–145)
WBC # SPEC AUTO: 5.2 10*3/UL (ref 4.5–11.5)

## 2022-02-23 ENCOUNTER — HISTORICAL (OUTPATIENT)
Dept: HEMATOLOGY/ONCOLOGY | Facility: CLINIC | Age: 37
End: 2022-02-23

## 2022-02-23 LAB
ABS NEUT (OLG): 7.88 (ref 2.1–9.2)
ALBUMIN SERPL-MCNC: 4.1 G/DL (ref 3.5–5)
ALBUMIN/GLOB SERPL: 1.5 {RATIO} (ref 1.1–2)
ALP SERPL-CCNC: 132 U/L (ref 40–150)
ALT SERPL-CCNC: 87 U/L (ref 0–55)
AST SERPL-CCNC: 37 U/L (ref 5–34)
BASOPHILS # BLD AUTO: 0 10*3/UL (ref 0–0.2)
BASOPHILS NFR BLD AUTO: 0.2 %
BILIRUB SERPL-MCNC: 0.4 MG/DL
BILIRUBIN DIRECT+TOT PNL SERPL-MCNC: 0.2 (ref 0–0.5)
BILIRUBIN DIRECT+TOT PNL SERPL-MCNC: 0.2 (ref 0–0.8)
BUN SERPL-MCNC: 11.8 MG/DL (ref 7–18.7)
CALCIUM SERPL-MCNC: 9.9 MG/DL (ref 8.7–10.5)
CHLORIDE SERPL-SCNC: 105 MMOL/L (ref 98–107)
CO2 SERPL-SCNC: 27 MMOL/L (ref 22–29)
CREAT SERPL-MCNC: 0.6 MG/DL (ref 0.55–1.02)
EOSINOPHIL # BLD AUTO: 0.1 10*3/UL (ref 0–0.9)
EOSINOPHIL NFR BLD AUTO: 0.7 %
ERYTHROCYTE [DISTWIDTH] IN BLOOD BY AUTOMATED COUNT: 13.1 % (ref 11.5–17)
GLOBULIN SER-MCNC: 2.7 G/DL (ref 2.4–3.5)
GLUCOSE SERPL-MCNC: 92 MG/DL (ref 74–100)
HCT VFR BLD AUTO: 33.5 % (ref 37–47)
HEMOLYSIS INTERF INDEX SERPL-ACNC: 2
HGB BLD-MCNC: 11.1 G/DL (ref 12–16)
ICTERIC INTERF INDEX SERPL-ACNC: 0
LIPEMIC INTERF INDEX SERPL-ACNC: 0
LYMPHOCYTES # BLD AUTO: 1.6 10*3/UL (ref 0.6–4.6)
LYMPHOCYTES NFR BLD AUTO: 14.7 %
MANUAL DIFF? (OHS): NO
MCH RBC QN AUTO: 32.8 PG (ref 27–31)
MCHC RBC AUTO-ENTMCNC: 33.1 G/DL (ref 33–36)
MCV RBC AUTO: 99.1 FL (ref 80–94)
MONOCYTES # BLD AUTO: 1 10*3/UL (ref 0.1–1.3)
MONOCYTES NFR BLD AUTO: 9.7 %
NEUTROPHILS # BLD AUTO: 7.9 10*3/UL (ref 2.1–9.2)
NEUTROPHILS NFR BLD AUTO: 73.2 %
PLATELET # BLD AUTO: 333 10*3/UL (ref 130–400)
PMV BLD AUTO: 9.7 FL (ref 9.4–12.4)
POTASSIUM SERPL-SCNC: 4.1 MMOL/L (ref 3.5–5.1)
PROT SERPL-MCNC: 6.8 G/DL (ref 6.4–8.3)
RBC # BLD AUTO: 3.38 10*6/UL (ref 4.2–5.4)
SODIUM SERPL-SCNC: 141 MMOL/L (ref 136–145)
WBC # SPEC AUTO: 10.8 10*3/UL (ref 4.5–11.5)

## 2022-03-02 ENCOUNTER — HISTORICAL (OUTPATIENT)
Dept: HEMATOLOGY/ONCOLOGY | Facility: CLINIC | Age: 37
End: 2022-03-02

## 2022-03-02 LAB
ALBUMIN SERPL-MCNC: 3.9 G/DL (ref 3.5–5)
ALBUMIN/GLOB SERPL: 1.3 {RATIO} (ref 1.1–2)
ALP SERPL-CCNC: 107 U/L (ref 40–150)
ALT SERPL-CCNC: 49 U/L (ref 0–55)
AST SERPL-CCNC: 23 U/L (ref 5–34)
BILIRUB SERPL-MCNC: 0.4 MG/DL
BILIRUBIN DIRECT+TOT PNL SERPL-MCNC: 0.1 (ref 0–0.5)
BILIRUBIN DIRECT+TOT PNL SERPL-MCNC: 0.3 (ref 0–0.8)
BUN SERPL-MCNC: 12.1 MG/DL (ref 7–18.7)
CALCIUM SERPL-MCNC: 9.5 MG/DL (ref 8.7–10.5)
CHLORIDE SERPL-SCNC: 107 MMOL/L (ref 98–107)
CO2 SERPL-SCNC: 27 MMOL/L (ref 22–29)
CREAT SERPL-MCNC: 0.63 MG/DL (ref 0.55–1.02)
GLOBULIN SER-MCNC: 2.9 G/DL (ref 2.4–3.5)
GLUCOSE SERPL-MCNC: 60 MG/DL (ref 74–100)
HEMOLYSIS INTERF INDEX SERPL-ACNC: 4
ICTERIC INTERF INDEX SERPL-ACNC: 0
LIPEMIC INTERF INDEX SERPL-ACNC: 1
POTASSIUM SERPL-SCNC: 4 MMOL/L (ref 3.5–5.1)
PROT SERPL-MCNC: 6.8 G/DL (ref 6.4–8.3)
SODIUM SERPL-SCNC: 144 MMOL/L (ref 136–145)

## 2022-03-03 LAB
ABS NEUT (OLG): 6.63 (ref 2.1–9.2)
BASOPHILS # BLD AUTO: 0 10*3/UL (ref 0–0.2)
BASOPHILS NFR BLD AUTO: 0.5 %
EOSINOPHIL # BLD AUTO: 0 10*3/UL (ref 0–0.9)
EOSINOPHIL NFR BLD AUTO: 0.4 %
ERYTHROCYTE [DISTWIDTH] IN BLOOD BY AUTOMATED COUNT: 13.2 % (ref 11.5–17)
HCT VFR BLD AUTO: 34.3 % (ref 37–47)
HGB BLD-MCNC: 11.3 G/DL (ref 12–16)
LYMPHOCYTES # BLD AUTO: 1 10*3/UL (ref 0.6–4.6)
LYMPHOCYTES NFR BLD AUTO: 12.6 %
MANUAL DIFF? (OHS): NO
MCH RBC QN AUTO: 32.3 PG (ref 27–31)
MCHC RBC AUTO-ENTMCNC: 32.9 G/DL (ref 33–36)
MCV RBC AUTO: 98 FL (ref 80–94)
MONOCYTES # BLD AUTO: 0.5 10*3/UL (ref 0.1–1.3)
MONOCYTES NFR BLD AUTO: 5.9 %
NEUTROPHILS # BLD AUTO: 6.6 10*3/UL (ref 2.1–9.2)
NEUTROPHILS NFR BLD AUTO: 79.5 %
PLATELET # BLD AUTO: 254 10*3/UL (ref 130–400)
PMV BLD AUTO: 9 FL (ref 9.4–12.4)
RBC # BLD AUTO: 3.5 10*6/UL (ref 4.2–5.4)
WBC # SPEC AUTO: 8.3 10*3/UL (ref 4.5–11.5)

## 2022-03-08 ENCOUNTER — HISTORICAL (OUTPATIENT)
Dept: HEMATOLOGY/ONCOLOGY | Facility: CLINIC | Age: 37
End: 2022-03-08

## 2022-03-08 LAB
ABS NEUT (OLG): 3.96 (ref 2.1–9.2)
ANION GAP SERPL CALC-SCNC: 15 MMOL/L
BASOPHILS # BLD AUTO: 0 10*3/UL (ref 0–0.2)
BASOPHILS NFR BLD AUTO: 0.4 %
BUN SERPL-MCNC: 10 MG/DL (ref 8–26)
CHLORIDE SERPL-SCNC: 105 MMOL/L (ref 98–109)
CREAT SERPL-MCNC: 0.6 MG/DL (ref 0.6–1.3)
EOSINOPHIL # BLD AUTO: 0 10*3/UL (ref 0–0.9)
EOSINOPHIL NFR BLD AUTO: 0.5 %
ERYTHROCYTE [DISTWIDTH] IN BLOOD BY AUTOMATED COUNT: 13.4 % (ref 11.5–17)
GLUCOSE SERPL-MCNC: 104 MG/DL (ref 70–105)
HCT VFR BLD AUTO: 34 % (ref 37–47)
HCT VFR BLD CALC: 33 % (ref 38–51)
HGB BLD-MCNC: 10.9 G/DL (ref 12–16)
HGB BLD-MCNC: 11.2 G/DL (ref 12–17)
LYMPHOCYTES # BLD AUTO: 1 10*3/UL (ref 0.6–4.6)
LYMPHOCYTES NFR BLD AUTO: 18.2 %
MANUAL DIFF? (OHS): NO
MCH RBC QN AUTO: 31.7 PG (ref 27–31)
MCHC RBC AUTO-ENTMCNC: 32.1 G/DL (ref 33–36)
MCV RBC AUTO: 98.8 FL (ref 80–94)
MONOCYTES # BLD AUTO: 0.6 10*3/UL (ref 0.1–1.3)
MONOCYTES NFR BLD AUTO: 9.8 %
NEUTROPHILS # BLD AUTO: 4 10*3/UL (ref 2.1–9.2)
NEUTROPHILS NFR BLD AUTO: 70.9 %
PLATELET # BLD AUTO: 272 10*3/UL (ref 130–400)
PMV BLD AUTO: 8.6 FL (ref 9.4–12.4)
POC IONIZED CALCIUM: 1.16 (ref 1.12–1.32)
POC TCO2: 26 (ref 24–29)
POTASSIUM BLD-SCNC: 4 MMOL/L (ref 3.5–4.9)
RBC # BLD AUTO: 3.44 10*6/UL (ref 4.2–5.4)
SODIUM BLD-SCNC: 141 MMOL/L (ref 138–146)
WBC # SPEC AUTO: 5.6 10*3/UL (ref 4.5–11.5)

## 2022-04-11 ENCOUNTER — HISTORICAL (OUTPATIENT)
Dept: RADIOLOGY | Facility: HOSPITAL | Age: 37
End: 2022-04-11

## 2022-04-11 ENCOUNTER — HISTORICAL (OUTPATIENT)
Dept: ADMINISTRATIVE | Facility: HOSPITAL | Age: 37
End: 2022-04-11

## 2022-04-11 ENCOUNTER — HISTORICAL (OUTPATIENT)
Dept: HEMATOLOGY/ONCOLOGY | Facility: CLINIC | Age: 37
End: 2022-04-11

## 2022-04-11 LAB
ABS NEUT (OLG): 3.93 (ref 2.1–9.2)
ALBUMIN SERPL-MCNC: 4.1 G/DL (ref 3.5–5)
ALBUMIN/GLOB SERPL: 1.5 {RATIO} (ref 1.1–2)
ALP SERPL-CCNC: 93 U/L (ref 40–150)
ALT SERPL-CCNC: 28 U/L (ref 0–55)
AST SERPL-CCNC: 19 U/L (ref 5–34)
BASOPHILS # BLD AUTO: 0 10*3/UL (ref 0–0.2)
BASOPHILS NFR BLD AUTO: 0.4 %
BILIRUB SERPL-MCNC: 0.4 MG/DL
BILIRUBIN DIRECT+TOT PNL SERPL-MCNC: 0.1 (ref 0–0.5)
BILIRUBIN DIRECT+TOT PNL SERPL-MCNC: 0.3 (ref 0–0.8)
BUN SERPL-MCNC: 10.4 MG/DL (ref 7–18.7)
CALCIUM SERPL-MCNC: 9.9 MG/DL (ref 8.7–10.5)
CANCER AG125 SERPL-ACNC: 8.8 (ref 0–35)
CHLORIDE SERPL-SCNC: 106 MMOL/L (ref 98–107)
CO2 SERPL-SCNC: 27 MMOL/L (ref 22–29)
CREAT SERPL-MCNC: 0.69 MG/DL (ref 0.55–1.02)
EOSINOPHIL # BLD AUTO: 0.1 10*3/UL (ref 0–0.9)
EOSINOPHIL NFR BLD AUTO: 1.8 %
ERYTHROCYTE [DISTWIDTH] IN BLOOD BY AUTOMATED COUNT: 12 % (ref 11.5–17)
GLOBULIN SER-MCNC: 2.8 G/DL (ref 2.4–3.5)
GLUCOSE SERPL-MCNC: 83 MG/DL (ref 74–100)
HCT VFR BLD AUTO: 39.2 % (ref 37–47)
HEMOLYSIS INTERF INDEX SERPL-ACNC: 9
HGB BLD-MCNC: 12.5 G/DL (ref 12–16)
ICTERIC INTERF INDEX SERPL-ACNC: 0
LIPEMIC INTERF INDEX SERPL-ACNC: 13
LYMPHOCYTES # BLD AUTO: 1.1 10*3/UL (ref 0.6–4.6)
LYMPHOCYTES NFR BLD AUTO: 20.4 %
MANUAL DIFF? (OHS): NO
MCH RBC QN AUTO: 31.3 PG (ref 27–31)
MCHC RBC AUTO-ENTMCNC: 31.9 G/DL (ref 33–36)
MCV RBC AUTO: 98.2 FL (ref 80–94)
MONOCYTES # BLD AUTO: 0.4 10*3/UL (ref 0.1–1.3)
MONOCYTES NFR BLD AUTO: 6.3 %
NEUTROPHILS # BLD AUTO: 3.9 10*3/UL (ref 2.1–9.2)
NEUTROPHILS NFR BLD AUTO: 70.7 %
PLATELET # BLD AUTO: 316 10*3/UL (ref 130–400)
PMV BLD AUTO: 9 FL (ref 9.4–12.4)
POTASSIUM SERPL-SCNC: 3.7 MMOL/L (ref 3.5–5.1)
PROT SERPL-MCNC: 6.9 G/DL (ref 6.4–8.3)
RBC # BLD AUTO: 3.99 10*6/UL (ref 4.2–5.4)
SODIUM SERPL-SCNC: 144 MMOL/L (ref 136–145)
WBC # SPEC AUTO: 5.6 10*3/UL (ref 4.5–11.5)

## 2022-04-13 ENCOUNTER — HISTORICAL (OUTPATIENT)
Dept: HEMATOLOGY/ONCOLOGY | Facility: CLINIC | Age: 37
End: 2022-04-13
Payer: COMMERCIAL

## 2022-04-30 NOTE — OP NOTE
Patient:   Ruben Clements             MRN: [id number]            FIN: [id number]               Age:   36 years     Sex:  Female     :  1985   Associated Diagnoses:   None   Author:   Benita Arambula MD      Operative Note   Operative Information   Date/ Time:  2021 09:10:00.     Procedures Performed: Operative laparoscopy with lysis of dense adhesions and right salpingo-oophorectomy  Pfannenstiel incision to complete procedure.     Preoperative Diagnosis: Pelvic pain  Persistent irregular shaped enlarged adnexal mass on the right.     Postoperative Diagnosis: Same.     Surgeon: Benita Arambula MD.     Anesthesia: General endotracheal anesthesia.     Speciman Removed: Right ovary and tube.     Description of Procedure/Findings/    Complications: Patient was taken to the operating room and general endotracheal anesthesia was obtained without difficulty.  She was prepped and draped in sterile fashion in the dorsal lithotomy position and indwelling catheter was placed bivalve speculum was placed into the circumflex processing with a tenaculum and a Hulka tenaculum was introduced a single-tooth and speculum removed attention was turned to the patient's abdomen where a subumbilical incision is a scalpel a Veress needle was introduced to obtain pneumoperitoneum A #5 mm trocar and sleeve were then introduced the trochars removed the sleeve was left in place camera was placed confirmed normal placement and CO2 significant reconnected.  With findings as previously stated a left upper quadrant incision was made with a scalpel and the trocar was introducer visualization camera was then placed in this report and a 3rd incision was made in the left lower quadrant 5 mm trocar and sleeve under direct visualization.  At this time using the camera and a atraumatic grasper the ovary was noted to be adhesed to the pelvic sidewall on the left as well as to the colon.  And the pericolic fat the ovary was then  atraumatically reduced off of the pelvic sidewall as well as freed up from the pericolic fat and colon.  Inspection of the left tube and ovary.  3 within normal limits because of the size of the mass irregularity as well as the appearance of rupture as to when we entered into the abdomen where there was bleeding around the ovary decision was made to proceed with removal and patient was consented for removal of tube and ovary if necessary.  The procerus and brought in pair (entocele) the infundibular pelvic ligament was then clamped cut and ligated and then the utero-ovarian ligament was then clamped cut and ligated the pelvis was then irrigated with copious amounts of normal saline the pedicles found to be hemostatic at this time secondary to the size of the adnexa A anastomosis was made Racheal retractor was then introduced and the was then delivered through this site.  Of note was already leaking at the time of start of the surgery.  Once it was completely remove the Racheal retractor was removed and the peritoneum was then closed with 2-0 Polysorb fascia closed with 0 Polysorb 16 mL was irrigated and closed with 2-0 Vicryl and this ended this time the abdomen was reinsufflated the camera was placed back into the abdomen and pelvis was once again copiously irrigated and all pedicles were noted to be hemostatic there was an area of denuded tissue over the pericolic fat that continued to bleed so therefore surgery was placed over this area let's this was done the abdomen was deflated and the camera was brought out the ports were then brought out and the skin was close of case subcuticular fashion using 4-0 Dexon beneath this incision was also closed in a subcuticular fashion using 4-0 Dexon at this time the patient left the patient's pelvis where the Hulka tenaculum and HUMI catheter were removed patient had approximately 100 mL of clear urine bivalve speculum was placed the cervix that be hemostatic    Speculum was  then removed upon discuss the patient's mom and she awakened at the recovery room in stable condition obstetrical correct ×2..     Esimated blood loss: loss  125  cc.     Findings: Approximately a 8 cm irregularly-shaped smooth-walled adnexal mass on the right that was adhesed to the left pelvic sidewall' right tube was also densely adhesed entwined into this ovary as well as to the overlying bowel also appearing approximately 7 week size uterus normal-appearing left ovary t; normal left fallopian tube.     Complications: None.

## 2022-04-30 NOTE — ED PROVIDER NOTES
Patient:   Ruben Clements             MRN: [id number]            FIN: [id number]               Age:   34 years     Sex:  Female     :  1985   Associated Diagnoses:   Acute pyelonephritis   Author:   Tejas Bee MD      Basic Information   Time seen: Date & time 2019 10:12:00.   History source: Patient.   Arrival mode: Private vehicle, walking.   History limitation: None.   Additional information: Chief Complaint from Nursing Triage Note : Chief Complaint   2019 9:41 CDT       Chief Complaint           L flank pain starting this morning    seen here a few days ago with same    Hx kidney stones..  .      History of Present Illness   The patient presents with Left flank pain started at 5 AM.  Patient was seen on 9/10/2019 for same complaint at that time was diagnosed with possible kidney stone.  Patient states she had some nausea and vomiting associated with this episode of pain..  The onset was 5  hours ago.  The course/duration of symptoms is constant.  The character of symptoms is sharp.  The degree at onset was moderate.  The Location of pain at onset was flank.  The degree at present is moderate.  The Location of pain at present is left and flank.  Radiating pain: left lower quadrant of the abdomen. The exacerbating factor is none.  The relieving factor is none.  Therapy today: none.  Risk factors consist of none.  Associated symptoms: nausea and vomiting.        Review of Systems   Constitutional symptoms:  Negative except as documented in HPI.   Skin symptoms:  Negative except as documented in HPI.   Eye symptoms:  Negative except as documented in HPI.   ENMT symptoms:  Negative except as documented in HPI.   Respiratory symptoms:  Negative except as documented in HPI.   Cardiovascular symptoms:  Negative except as documented in HPI.   Gastrointestinal symptoms:  Negative except as documented in HPI.   Genitourinary symptoms:  Negative except as documented in HPI.   Musculoskeletal  symptoms:  Negative except as documented in HPI.   Neurologic symptoms:  Negative except as documented in HPI.   Psychiatric symptoms:  Negative except as documented in HPI.             Additional review of systems information: All other systems reviewed and otherwise negative.      Health Status   Allergies:    Allergic Reactions (Selected)  No Known Medication Allergies,    Allergies (1) Active Reaction  No Known Medication Allergies None Documented  .   Medications:  (Selected)   Inpatient Medications  Ordered  IVF Normal Saline NS Bolus 1000ml: 1,000 mL, 1,000 mL, IV, 1000 mL/hr, start date 09/13/19 10:19:00 CDT, stop date 09/13/19 10:19:00 CDT, STAT  Documented Medications  Documented  acetaminophen 325 mg oral tablet: 650 mg = 2 tab(s), Oral, q4hr, PRN pain, 0 Refill(s)  aspirin 325 mg oral tablet: 325 mg = 1 tab(s), Oral, Daily, 0 Refill(s).      Past Medical/ Family/ Social History   Medical history:    Resolved  Muscle spasm of back ([id number]):  Resolved.  Hyperlipidemia ([id number]):  Resolved..   Surgical history: Negative.   Family history: Not significant.   Social history:    Social & Psychosocial Habits    Alcohol  11/27/2014 Risk Assessment: Denies Alcohol Use    Substance Use  11/27/2014 Risk Assessment: Denies Substance Abuse    Tobacco  11/27/2014 Risk Assessment: Denies Tobacco Use    09/10/2019  Use: Never (less than 100 in l    Patient Wants Consult For Cessation Counseling No    09/13/2019  Use: Never (less than 100 in l    Patient Wants Consult For Cessation Counseling No    Abuse/Neglect  09/10/2019  SHX Any signs of abuse or neglect No    09/13/2019  SHX Any signs of abuse or neglect No  , Tobacco use: Denies.   Problem list:    Active Problems (1)  Mobility   .      Physical Examination               Vital Signs   Vital Signs   9/13/2019 11:01 CDT      Peripheral Pulse Rate     55 bpm  LOW                             Respiratory Rate          16 br/min                              SpO2                      100 %                             Oxygen Therapy            Room air                             Systolic Blood Pressure   111 mmHg                             Diastolic Blood Pressure  64 mmHg                             Mean Arterial Pressure, Cuff              80 mmHg    9/13/2019 9:41 CDT       Temperature Temporal Artery               36.9 DegC                             Peripheral Pulse Rate     57 bpm  LOW                             Respiratory Rate          22 br/min                             SpO2                      100 %                             Oxygen Therapy            Room air                             Systolic Blood Pressure   114 mmHg                             Diastolic Blood Pressure  41 mmHg  LOW  .      Vital Signs (last 24 hrs)_____  Last Charted___________  Heart Rate Peripheral   L 55bpm  (SEP 13 11:01)  Resp Rate         16 br/min  (SEP 13 11:01)  SBP      111 mmHg  (SEP 13 11:01)  DBP      64 mmHg  (SEP 13 11:01)  SpO2      100 %  (SEP 13 11:01)  Weight      92 kg  (SEP 13 09:41)  .   Measurements   9/13/2019 9:41 CDT       Weight Dosing             92 kg                             Weight Measured           92 kg                             Weight Measured and Calculated in Lbs     202.82 lb  .   Basic Oxygen Information   9/13/2019 11:01 CDT      SpO2                      100 %                             Oxygen Therapy            Room air    9/13/2019 9:41 CDT       SpO2                      100 %                             Oxygen Therapy            Room air  .   General:  Alert, no acute distress.    Skin:  Warm, dry, pink, intact.    Head:  Normocephalic, atraumatic.    Neck:  Supple, trachea midline, no tenderness, no JVD.    Eye:  Pupils are equal, round and reactive to light, extraocular movements are intact, normal conjunctiva.    Cardiovascular:  Regular rate and rhythm, No murmur, Normal peripheral perfusion, No edema.     Respiratory:  Lungs are clear to auscultation, respirations are non-labored, breath sounds are equal.    Chest wall:  No tenderness, No deformity.    Back:  Nontender, Normal range of motion, Normal alignment.    Musculoskeletal:  Normal ROM, normal strength, no tenderness, no swelling, no deformity.    Gastrointestinal:  Soft, Non distended, Normal bowel sounds, No organomegaly, Tenderness: Left flank, Left CVA tenderness.    Neurological:  Alert and oriented to person, place, time, and situation, No focal neurological deficit observed.    Lymphatics:  No lymphadenopathy.   Psychiatric:  Cooperative, appropriate mood & affect, normal judgment.       Medical Decision Making   Orders  Launch Orders   Laboratory:  UPT - Urine Pregancy Test (Order): Stat collect, Urine, 9/13/2019 10:19 CDT, Nurse collect, 9/13/2019 10:19 CDT  Drug Screen Urine AGH (Order): Stat collect, Urine, 9/13/2019 10:19 CDT, Nurse collect  Urinalysis with Microscopic if Indicated (Order): Stat collect, Urine, 9/13/2019 10:19 CDT, Nurse collect  PTT (Order): Stat collect, 9/13/2019 10:19 CDT, Blood, Lab Collect, 9/13/2019 10:19 CDT  INR - Protime (Order): Stat collect, 9/13/2019 10:19 CDT, Blood, Lab Collect, 9/13/2019 10:19 CDT  CMP (Order): Stat collect, 9/13/2019 10:19 CDT, Blood, Lab Collect, 9/13/2019 10:19 CDT  CBC w/ Auto Diff (Order): Now collect, 9/13/2019 10:18 CDT, Blood, Lab Collect, 9/13/2019 10:18 CDT  Pharmacy:  Sodium Chloride 0.9% intravenous solution (IVF Normal Saline NS Bolus 1000ml) (Order): 1,000 mL, 1,000 mL, IV, start date 9/13/2019 10:19 CDT, stop date 9/13/2019 10:19 CDT  Radiology:  CT Abdomen and Pelvis W Contrast (Order): Stat, 9/13/2019 10:19 CDT, Abdominal Pain, None, Stretcher, Rad Type, Schedule this test.   Results review:  Lab results : Lab View   9/13/2019 10:55 CDT      U Beta hCG Ql             Negative                             U pH                      6.0                             UA Appear                  CLEAR                             UA Color                  YELLOW                             UA Spec Grav              1.020  NA                             UA Bili                   Negative                             UA pH                     6.0  NA                             UA Urobilinogen           0.2 EU/dL                             UA Blood                  Trace                             UA Glucose                Negative                             UA Ketones                15 mg/dL                             UA Protein                Negative                             UA Nitrite                Negative                             UA Leuk Est               Negative                             UA WBC                    None Seen                             UA RBC                    0-2 /HPF                             UA Bacteria               None Seen /HPF                             UA Squam Epithelial       Few /LPF                             UA Additional             UA Additional                             U Temp                    25.0 DegC                             U Amph Scr                NEG.                             U Yaritza Scr                NEG.                             U Benzodia Scr            NEG.                             U Cannab Scr              NEG.                             U Cocaine Scr             NEG.                             U Opiate Scr              NEG.                             U Phencyclidine Scr       NEG.                             U Methadone               NEG.                             U MDMA Scr                NEG.    9/13/2019 9:45 CDT       Sodium Lvl                137 mmol/L                             Potassium Lvl             3.9 mmol/L                             Chloride                  101 mmol/L                             CO2                       25 mmol/L                             Calcium Lvl               9.2 mg/dL                              Glucose Lvl               113 mg/dL                             BUN                       11 mg/dL                             Creatinine                0.80 mg/dL                             eGFR-AA                   >60 mL/min/1.73 m2  NA                             eGFR-ZAHRA                  >60 mL/min/1.73 m2  NA                             Bili Total                0.6 mg/dL                             Bili Direct               0.10 mg/dL                             Bili Indirect             0.50 mg/dL  NA                             AST                       13 unit/L  LOW                             ALT                       20 unit/L                             Alk Phos                  92 unit/L                             Total Protein             8.1 gm/dL                             Albumin Lvl               4.0 gm/dL                             Globulin                  4.10 gm/dL  NA                             A/G Ratio                 1.0 ratio  NA                             PT                        10.5 second(s)                             INR                       1.0                             PTT                       27.1 second(s)                             WBC                       18.0 x10(3)/mcL  HI                             RBC                       4.54 x10(6)/mcL                             Hgb                       13.8 gm/dL                             Hct                       41.9 %                             Platelet                  460 x10(3)/mcL  HI                             MCV                       92 fL                             MCH                       30 pg                             MCHC                      33 gm/dL                             RDW                       11.9 %                             MPV                       9.8 fL                             Abs Neut                  15.9 x10(3)/mcL  HI                              Neutro Auto               88 %  HI                             Lymph Auto                7 %  LOW                             Mono Auto                 4 %                             Eos Auto                  0 %                             Abs Eos                   0.0 x10(3)/mcL                             Basophil Auto             0 %                             Abs Neutro                15.9 x10(3)/mcL  HI                             Abs Lymph                 1.3 x10(3)/mcL                             Abs Mono                  0.7 x10(3)/mcL                             Abs Baso                  0.0 x10(3)/mcL                             IG%                       0.400 %                             IG#                       0.0800  HI  ,    Labs (Last four charted values)  WBC                  H 18.0 (SEP 13)   Hgb                  13.8 (SEP 13)   Hct                  41.9 (SEP 13)   Plt                  H 460 (SEP 13)   Na                   137 (SEP 13)   K                    3.9 (SEP 13)   CO2                  25 (SEP 13)   Cl                   101 (SEP 13)   Cr                   0.80 (SEP 13)   BUN                  11 (SEP 13)   Glucose Random       113 (SEP 13)   PT                   10.5 (SEP 13)   INR                  1.0 (SEP 13)   PTT                  27.1 (SEP 13) .    Radiology results:  Rad Results (ST)  < 12 hrs   Accession: [id number]  Order: CT Abdomen and Pelvis W Contrast  Report Dt/Tm: 09/13/2019 12:32  Report:   CT ABDOMEN AND PELVIS WITH CONTRAST:     HISTORY: Abdominal Pain          PATIENT RADIATION DOSE:  CTDI vol(mGy) WITH: 107.10                                                  DLP(mGycm) WITH:  4920.20      As per PQRS measures, all CT scans at this facility used dose  modulation, iterative reconstruction, and/or weight based dose  adjustment when appropriate to reduce radiation dose to as low as  reasonably achievable.     COMPARISON: 9/10/2019     FINDINGS: Serial axial images were  obtained through the abdomen and  pelvis with the administration of  IV contrast. Coronal and sagittal  reconstructions where also obtained. The heart is normal in size. Lung  bases are relatively clear. The liver, spleen, adrenal glands, and  pancreas are grossly within normal limits. The kidneys are relatively  symmetric in size. There is minimal left hydroureteronephrosis with  delayed nephrogram effect. No distinct renal or ureteral stone is not  identified with certainty. No obvious renal stone is appreciated.  There is a delayed nephrogram effect evident on the left. No  significant contrast is seen within the left upper renal collecting  system or ureter on delayed postcontrast injection. No hydroureter is  seen. No definitive ureteral stone is seen. The left renal vein as  well opacified. The left renal artery appears similar to the right. No  significant atherosclerosis or obvious renal artery stenosis is  appreciated. The gallbladder is surgically absent. No dilated loops of  bowel are seen. Gas and feces are scattered throughout the colon. A  few small lymph nodes are seen within the right lower abdomen. The  appendix is within normal limits. Trace free fluid is seen within the  posterior pelvis. The uterus is normal in size. There is faint fluid  density at the endometrium and cervix. The bladder is partially  distended with contrast on delayed postcontrast imaging.     IMPRESSION:  1. Delayed nephrogram effect evident on the left with no definite  hydronephrosis or renal/ureteral stone identified. Differential  includes a infectious/inflammatory process versus a possible renal  artery stenosis or renal vein thrombosis. Clinical correlation is  indicated. Urologic/nephrologic consultation is recommended.  2. Trace free fluid posterior pelvis      .      Reexamination/ Reevaluation   Vital signs   Basic Oxygen Information   9/13/2019 11:01 CDT      SpO2                      100 %                              Oxygen Therapy            Room air    9/13/2019 9:41 CDT       SpO2                      100 %                             Oxygen Therapy            Room air        Impression and Plan   Diagnosis   Acute pyelonephritis ([id number])      Calls-Consults   -  9/13/2019 12:51:00 , Ericka GIPSON, Yoly Mckenzie to admit start Cipro.    Plan   Condition: Improved, Stable.    Disposition: Admit time  9/13/2019 12:51:00, Place in Observation Unit.    Counseled: Patient, Family, Regarding diagnosis, Regarding diagnostic results, Regarding treatment plan, Patient indicated understanding of instructions.    Orders: Launch Orders   Pharmacy:  Cipro I.V. 400 mg/200 mL intravenous solution (Order): 400 mg, IV, q12hr, first dose 9/13/2019 12:52 CDT, STAT  Admit/Transfer/Discharge:  Admit to Outpatient Observation (Order): 9/13/2019 12:52 CDT, Medical Unit Ericka GIPSON, Chris ABDI, No.

## 2022-05-03 NOTE — HISTORICAL [facility]
This is a historical note converted from Hector. Formatting and pictures may have been removed.  Please reference Hector for original formatting and attached multimedia. Admit and Discharge Dates  Admit Date: 09/13/2019  Discharge Date: 09/15/2019  Physicians  Attending Physician - Chris Barnett MD  Admitting Physician - Chris Barnett MD  Primary Care Physician - Kalpesh CARBONE, FNP , Yulia BLAKE,  Discharge Diagnosis  1.?Acute pyelonephritis?N10  2.?Flank pain?[id number]  3.?HTN (hypertension)?I10  4.?GERD (gastroesophageal reflux disease)?K21.9  Surgical Procedures  No procedures recorded for this visit.  Immunizations  No immunizations recorded for this visit.  Hospital Course  35yo female admitted for left flank pain with radiation to the left groin.? WBC was elevated at 18 upon admit.? She actually presented to the ED 2 days prior and was diagnosed with a renal stone but she was sent home.? Urine culture did grow out E. coli.? She came back with worsening pain.? repeat CT scan now shows evidence of pyelonephritis on the left.? She was placed on IV cipro with improvement of her symptoms.? Today her WBC is normal and she is stable for discharge home.  Time Spent on discharge  D/C=36mins  Objective  Vitals & Measurements  T:?36.7? ?C (Oral)? TMIN:?36.5? ?C (Oral)? TMAX:?36.7? ?C (Oral)? HR:?54(Peripheral)? RR:?20? BP:?105/70? SpO2:?97%?  Physical Exam  General: ?Alert and oriented, No acute distress. ?  ??????? ? Appearance: Well nourished. ?  ??????? ? Behavior: Appropriate. ?  ??????? ? Skin: Normal for ethnicity. ?  ?????Eye: ?Pupils are equal, round and reactive to light, Extraocular movements are intact. ?  ?????HENT: ?Normocephalic, Normal hearing, Oral mucosa is moist, No pharyngeal erythema. ?  ?????Neck: ?Supple, Non-tender, No carotid bruit, No jugular venous distention, No lymphadenopathy, No thyromegaly. ?  ?????Respiratory: ?Lungs are clear to auscultation, Breath  sounds are equal, No chest wall tenderness. ?  ?????Cardiovascular: ?Normal rate, Regular rhythm, No murmur, No gallop, Good pulses equal in all extremities, Normal peripheral perfusion, No edema. ?  ?????Gastrointestinal: ?Soft, Non-tender, Non-distended, Normal bowel sounds, No organomegaly. ?obese  ?????Genitourinary: ?No costovertebral angle tenderness, No urethral discharge. ?  ?????Lymphatics: ?No lymphadenopathy neck, axilla, groin. ?  ?????Musculoskeletal: ?Normal range of motion, Normal strength, No tenderness, No swelling, Normal gait. ?  ?????Integumentary: ?Warm, Dry, Pink, Intact, No rash. ?  ?????Neurologic: ?Alert, Oriented, Normal sensory, Normal motor function, No focal deficits, Cranial Nerves II-XII are grossly intact. ?  ?????Psychiatric: ?Cooperative, Appropriate mood & affect, Normal judgment. ?  Patient Discharge Condition  stable  Discharge Disposition  home   Discharge Medication Reconciliation  Prescribed  ciprofloxacin (Cipro 500 mg oral tablet)?500 mg, Oral, q12hr  ketorolac (Toradol 10 mg oral tablet)?10 mg, Oral, QID, PRN as needed for pain  Continue  aspirin (aspirin 81 mg oral Delayed Release (EC) tablet)?81 mg, Oral, Daily  loratadine (loratadine 10 mg oral capsule)?10 mg, Oral, Daily  Education and Orders Provided  Pyelonephritis, Adult  Discharge - 09/15/19 12:47:00 CDT, Home?  Discharge Diet - Regular?  Discharge Activity - Activity as Tolerated?  Follow up  pcp in 1-2 weeks

## 2022-05-03 NOTE — HISTORICAL [facility]
This is a historical note converted from Hector. Formatting and pictures may have been removed.  Please reference Hector for original formatting and attached multimedia. Chief Complaint  L flank pain starting this morning ? ?seen here a few days ago with same ? ?Hx kidney stones..  History of Present Illness  33yo female presents to the ED c/o left flank and groin pain.? She actually came to the ED on 9/10/19 with similar complaints.? She was found to have a kidney stone at that time.? She was sent home after IV fluids.? Urine culture from that day did grow out E. coli.? This morning the pain got worse and was sharp in nature with no relieving factors.? Pain rated at8/10.She was also c/o N/V.? No fever/chills.? No SOB/chest pain. Today a repeat CT scan was obtained which did not show the stone but does show evidence of pyelonephritis.? She did say that some time last week she did have a ford placed briefly for a procedure at [facility].? WBC also went up since the 10th.  Review of Systems  ?  ?????Constitutional: ?No fever, No chills, No sweats, No weakness, No fatigue. ?  ?????Eye: ?No double vision, No dry eyes, No photophobia. ?  ?????Ear/Nose/Mouth/Throat: ?No ear pain, No nasal congestion, No sore throat. ?  ?????Respiratory: ?No shortness of breath, No cough, No sputum production, No hemoptysis, No wheezing, No pleuritic pain. ?  ?????Cardiovascular: ?No chest pain, No palpitations, No peripheral edema, No syncope. ?  ?????Gastrointestinal:??+ nausea,?+ vomiting, No diarrhea, No constipation, No heartburn, N+abdominal pain. ?  ?????Genitourinary: ?No dysuria, No hematuria, No change in urine stream. ?  ?????Hematology/Lymphatics: ?No anemia, No bruising tendency, No bruise, No hemorrhage, No petechiae. ?  ?????Endocrine: ?No excessive thirst, No polyuria, No cold intolerance. ?  ?????Immunologic: ?No recurrent fevers, No recurrent infections. ?  ?????Musculoskeletal: ?Joint pain,?+ back pain, No muscle pain, No  decreased range of motion. ?  ?????Integumentary: ?No rash, No pruritus, No petechiae, No skin lesion. ?  ?????Neurologic: ?Alert and oriented X4, No abnormal balance, No confusion, No tingling. ?  ?????Psychiatric: ?No anxiety, No depression. ?  Physical Exam  Vitals & Measurements  T:?36.8? ?C (Oral)? TMIN:?36.8? ?C (Oral)? TMAX:?36.9? ?C (Temporal Artery)? HR:?59(Peripheral)? RR:?16? BP:?106/65? SpO2:?100%? WT:?92?kg?  General: ?Alert and oriented, No acute distress. ?  ??????? ? Appearance: Well nourished. ?  ??????? ? Behavior: Appropriate. ?  ??????? ? Skin: Normal for ethnicity. ?  ?????Eye: ?Pupils are equal, round and reactive to light, Extraocular movements are intact. ?  ?????HENT: ?Normocephalic, Normal hearing, Oral mucosa is moist, No pharyngeal erythema. ?  ?????Neck: ?Supple, Non-tender, No carotid bruit, No jugular venous distention, No lymphadenopathy, No thyromegaly. ?  ?????Respiratory: ?Lungs are clear to auscultation, Breath sounds are equal, No chest wall tenderness. ?  ?????Cardiovascular: ?Normal rate, Regular rhythm, No murmur, No gallop, Good pulses equal in all extremities, Normal peripheral perfusion, No edema. ?  ?????Gastrointestinal: ?Soft, LLQ tenderness, Non-distended, Normal bowel sounds, No organomegaly. ?obese  ?????Genitourinary:??+ costovertebral angle tenderness to left, No urethral discharge. ?  ?????Lymphatics: ?No lymphadenopathy neck, axilla, groin. ?  ?????Musculoskeletal: ?Normal range of motion, Normal strength, No tenderness, No swelling, Normal gait. ?  ?????Integumentary: ?Warm, Dry, Pink, Intact, No rash. ?  ?????Neurologic: ?Alert, Oriented, Normal sensory, Normal motor function, No focal deficits, Cranial Nerves II-XII are grossly intact. ?  ?????Psychiatric: ?Cooperative, Appropriate mood & affect, Normal judgment. ?  Assessment/Plan  1.?Acute pyelonephritis?N10  2.?Flank pain?[id number]  3.?HTN (hypertension)?I10  4.?GERD  (gastroesophageal reflux disease)?K21.9  Orders:  ketorolac, 30 mg, form: Injection, IV, q6hr PRN for as needed for pain, Order duration: 5 day(s), first dose 09/13/19 17:29:00 CDT, stop date 09/18/19 17:28:00 CDT  Basic Metabolic Panel, Routine collect, 09/14/19 5:00:00 CDT, Blood, Stop date 09/14/19 5:00:00 CDT, Lab Collect, 09/14/19 5:00:00 CDT  CBC w/ Auto Diff, AM Next collect, 09/14/19 5:00:00 CDT, Blood, Stop date 09/14/19 5:00:00 CDT, Lab Collect, 09/14/19 5:00:00 CDT  Magnesium Level, AM Next collect, 09/14/19 5:00:00 CDT, Blood, Stop date 09/14/19 5:00:00 CDT, Lab Collect, 09/14/19 5:00:00 CDT  Regular Diet, 09/13/19 14:03:00 CDT, Start Meal: Now  IV fluids  DVt prophylaxis  IV Cipro  Toradol for pain control  follow labs  review home meds  consider Urology consult if no improvement   Problem List/Past Medical History  Ongoing  CVA - Cerebrovascular accident  Historical  Hyperlipidemia  Muscle spasm of back  Procedure/Surgical History  Diagnostic ultrasound of heart (12/01/2014)  Cholecystectomy;  Tonsillectomy and adenoidectomy; age 13 or over   Medications  Inpatient  acetaminophen, 650 mg= 2 tab(s), Oral, q6hr, PRN  acetaminophen, 1000 mg= 2 tab(s), Oral, q6hr, PRN  Cipro I.V. 400 mg/200 mL intravenous solution, 400 mg= 200 mL, IV, [id number]  Sodium Chloride 0.9% intravenous solution 1,000 mL, 1000 mL, IV  Toradol, 30 mg= 1 mL, IV, q6hr, PRN  Zofran, 4 mg= 2 mL, IV Push, q4hr, PRN  Home  aspirin 81 mg oral Delayed Release (EC) tablet, 81 mg= 1 tab(s), Oral, Daily  loratadine 10 mg oral capsule, 10 mg= 1 cap(s), Oral, Daily  Allergies  No Known Medication Allergies  Social History  Abuse/Neglect  No, 09/13/2019  No, 09/10/2019  Alcohol - Denies Alcohol Use, 11/27/2014  Substance Use - Denies Substance Abuse, 11/27/2014  Tobacco - Denies Tobacco Use, 11/27/2014  Never (less than 100 in lifetime), No, 09/13/2019  Never (less than 100 in lifetime), No, 09/10/2019  Family History  DM, HTN, kidney  stones  Immunizations  Vaccine Date Status Comments   influenza virus vaccine, inactivated 11/29/2014 Given    Lab Results  Test Name Test Result Date/Time   Sodium Lvl 137 mmol/L 09/13/2019 09:45 CDT   Potassium Lvl 3.9 mmol/L 09/13/2019 09:45 CDT   Chloride 101 mmol/L 09/13/2019 09:45 CDT   CO2 25 mmol/L 09/13/2019 09:45 CDT   Glucose Lvl 113 mg/dL 09/13/2019 09:45 CDT   BUN 11 mg/dL 09/13/2019 09:45 CDT   Creatinine 0.80 mg/dL 09/13/2019 09:45 CDT   INR 1.0 09/13/2019 09:45 CDT   WBC 18.0 x10(3)/mcL (High) 09/13/2019 09:45 CDT   Hgb 13.8 gm/dL 09/13/2019 09:45 CDT   Hct 41.9 % 09/13/2019 09:45 CDT   Platelet 460 x10(3)/mcL (High) 09/13/2019 09:45 CDT

## 2022-05-10 DIAGNOSIS — C54.1 ENDOMETRIAL CANCER: Primary | ICD-10-CM

## 2022-05-14 NOTE — PROGRESS NOTES
Patient:   Ruben Clements            MRN: [id number]            FIN: [id number]               Age:   37 years     Sex:  Female     :  1985   Associated Diagnoses:   None   Author:   Breanne MURRIETA MD, Dank ABDI        Medical oncologist in [place]:  Jonas Velazquez MD  GYN/ONC:  Darryn Pires MD      Visit Information     Problem List:   1.  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  pT1a, N0, M0 stage IA endometrial cancer diagnosed 2021    **An addendum to the pathology report from 2021 internal 2021 states that the pathologist and the submitting physician's physician assistant had a discussion, and the possibility that the ovary is a metastatic lesion from the endometrium was discussed with a metastasis to the ovary being favored by the clinician.  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  I do not feel that this would change my management, will proceed with 6 cycles of adjuvant carboplatin and paclitaxel.    Treatment Plan:  1.  Adjuvant carboplatin and paclitaxel given every 3 weeks for 6 cycles, started on 10/27/2021.    Diagnosis/Treatment/HPI:   36-year-old female who was having irregular menses, pelvic pain, abdominal pain along with nausea and vomiting.  She presented to see her OB/GYN, imaging was done that showed ovarian cyst.  She underwent a right oophorectomy on 2021, pathology revealed a 15 cm well-differentiated endometrioid adenocarcinoma involving the right ovary, right fallopian tube was uninvolved.  She was then referred to Dr. Darryn Pires with GYN oncology in [place], [place].  On 2021, she underwent Terra hereditary cancer test, this was negative for pathogenic mutations.  Her [facility] breast cancer risk assessment was 13.9%  (general population was 13.2%, no significant increased risk compared to general population).  She underwent an endometrial biopsy on 8/18/2021 that revealed FIGO grade 1 endometrioid endometrial adenocarcinoma.  She then underwent a robotic hysterectomy, left oophorectomy, bilateral pelvic sentinel node dissection with partial omentectomy and peritoneal biopsies of cul-de-sac nodules.  Pathology revealed endometrioid endometrial adenocarcinoma, FIGO 1 with less than 50% invasion.  Margins were negative, lymph nodes were negative, cul-de-sac nodules were positive for metastatic endometrial carcinoma.  Originally, it was discussed that the cul-de-sac lesions were from her ovarian cancer, this would stage her right ovarian cancer as a pT2b, N0, M0 stage IIB and would stage her endometrial cancer as a pT1a, N0, M0 stage IA.  Per an addendum on 9/17/2021, further discussion suggested a pT3a, N0, M0 stage IIIA endometrial cancer.  Patient was seen by Dr. Velazquez on 9/23/2021, plan was to treat with adjuvant carbo Taxol for 6 cycles.  Due to the fact that the patient lives in [place], [place], it was decided to refer to an oncologist in [place], [place] as this was closer to home.  I initially saw the patient on 1018 at that visit, she stated to overall be feeling well.  She did have some numbness and tingling, also some depression and anxiety, but had no other major issues to discuss.         Chief Complaint   Follow Up        2/16/2022 9:45 CST       patient still has a cough and did have a bad earache last night but stated she put alcohol on q-tip which helped ease the pain    2/16/2022 9:42 CST       patient still has a cough and did have a bad earache last night but stated she put alcohol on q-tip which helped ease the pain  (Modified)       Interval History   Patient presents to clinic for scheduled follow up appointment.  She overall is doing well.  Her sinus infection has improved.  She will get her last  round of chemo today, she is very happy about this.    PMHx:  1.  Stroke in 2014  2.  Depression  3.  Anxiety     PSHx:   1.  Cholecystectomy 2013  2.  Right oophorectomy June 2021  3.  Total abdominal hysterectomy and left oophorectomy in September 2021    Social Hx:   Patient denies tobacco, alcohol, or illicit drug use.  She works as a  with [employer].    Family Hx:   She has a family history of cancer, diabetes, heart disease, hypertension, stroke.         Review of Systems   12 point review of systems done in full with pertinent positives as described above  Remainder of review systems done in full and unremarkable.   Constitutional:  No fever, No chills.    Eye:  No icterus.    Ear/Nose/Mouth/Throat:  No nasal congestion, No sore throat.    Respiratory:  No shortness of breath, No cough.    Cardiovascular:  No chest pain.    Gastrointestinal:  No nausea, No vomiting, No diarrhea, No constipation.    Genitourinary:  No dysuria, No hematuria.    Hematology/Lymphatics:  No bruising tendency, No bleeding tendency.    Musculoskeletal:  No back pain.    Integumentary:  No rash, No pruritus.    Neurologic:  Numbness, Tingling, No headache.    Psychiatric:  Anxiety, Depression.       Health Status   Allergies:    Allergic Reactions (Selected)  No Known Allergies  No Known Medication Allergies,    Allergies (2) Active Reaction  No Known Allergies None Documented  No Known Medication Allergies None Documented     Current medications:  (Selected)   Outpatient Medications  Ordered  Aloxi (for IVPB): 0.25 mg, form: Injection, IV Piggyback, Once-chemo, Infuse over: 20 minute(s), first dose 02/16/22 10:20:00 CST, stop date 02/16/22 10:20:00 CST, Days 1  Benadryl 50mg/ml Inj: 25 mg, form: Injection, IV Piggyback, Once-chemo, Infuse over: 20 minute(s), first dose 02/16/22 10:20:00 CST, stop date 02/16/22 10:20:00 CST, Routine, Days 1  Heparin Flush 100 U/mL - 5 mL: 500 units, form: Injection, IV  Push, Once-chemo, first dose 02/16/22 14:10:00 CST, stop date 02/16/22 14:10:00 CST, Routine, Days 1  Neulasta Onpro Kit: 6 mg, form: Kit, Subcutaneous, Once-chemo, first dose 02/16/22 10:20:00 CST, stop date 02/16/22 10:20:00 CST, Routine, Diagnosis: Drug Induced Neutropenia, Days 1  Pepcid (for IVPB): 20 mg, form: Injection, IV Piggyback, Once-chemo, Infuse over: 20 minute(s), first dose 02/16/22 10:20:00 CST, stop date 02/16/22 10:20:00 CST, Days 1  Taxol (for IVPB): 360.5 mg, form: Injection, IV Piggyback, Once-chemo, Infuse over: 3 hr, first dose 02/16/22 10:40:00 CST, stop date 02/16/22 10:40:00 CST, Days 1  carboplatin (for IVPB): 591.05 mg, form: Injection, IV Piggyback, Once-chemo, Infuse over: 30 minute(s), first dose 02/16/22 13:40:00 CST, stop date 02/16/22 13:40:00 CST, Waste Sort Code: CHEMO, Days 1  dexamethasone (for IVPB): 10 mg, form: Soln, IV Piggyback, Once-chemo, Infuse over: 20 minute(s), first dose 02/16/22 10:20:00 CST, stop date 02/16/22 10:20:00 CST, Days 1  fosaprepitant (for IVPB): 150 mg, form: Powder-Inj, IV Piggyback, Once-chemo, Infuse over: 30 minute(s), first dose 02/16/22 10:20:00 CST, stop date 02/16/22 10:20:00 CST, Days 1  Prescriptions  Prescribed  Phenergan 25 mg Tab: 25 mg = 1 tab(s), Oral, q6hr, PRN PRN as needed for nausea/vomiting, # 90 tab(s), 1 Refill(s), Pharmacy: [id number], 160, cm, Height/Length Dosing, 12/28/21 14:39:00 CST, 102.6, kg, Weight Dosing, 12/28/21 14:39:00 CST  prochlorperazine 5 mg oral tablet: 5 mg = 1 tab(s), Oral, TID, PRN PRN as needed for nausea/vomiting, # 30 tab(s), 1 Refill(s), Pharmacy: [facility] #[id number], 160, cm, Height/Length Dosing, 10/27/21 13:30:00 CDT, 101.3, kg, Weight Dosing, 10/29/21 11:06:00 CDT  Documented Medications  Documented  Centrum Women's oral tablet: 2 tab(s), Oral, qAM  Flonase 50 mcg/inh nasal spray: 1 spray(s), Nasal, BID, 0 Refill(s)  Pantoprazole 40 mg ORAL EC-Tablet: 40 mg = 1 tab(s), Oral,  qAM  Vitamin B Complex oral capsule: 1 tab(s), Oral, Daily, 0 Refill(s)  Vitamin B6 250 mg oral tablet: 1 tab, Oral, Daily, 0 Refill(s)  aspirin 81 mg oral Delayed Release (EC) tablet: 81 mg = 1 tab(s), Oral, qAM, Stopped 6/6/21, 0 Refill(s)  cetirizine 10 mg oral tablet: 10 mg = 1 tab(s), Oral, qAM  fluoxetine 20 mg oral capsule: 20 mg = 1 cap(s), Oral, Daily  glutamine oral powder for reconstitution: 10 gms, Oral, BID, 0 Refill(s)  naproxen sodium 220 mg oral tablet: See Instructions, 2 cap(s) Oral BID for 3 days after chemotherapy, 0 Refill(s)  prochlorperazine 5 mg oral tablet: 5 mg = 1 tab(s), Oral, q6hr, PRN PRN as needed for nausea/vomiting, # 30 tab(s), 0 Refill(s)   Problem list:    All Problems  CVA - Cerebrovascular accident / SNOMED CT 012820260 / Confirmed  Cancer of endometrium / SNOMED CT 640944276 / Confirmed  Mobility / SNOMED CT 847069075 / Confirmed  Obesity / SNOMED CT 7885467359 / Probable  Encounter for antineoplastic chemotherapy / SNOMED CT 415168000 / Confirmed  Resolved: At risk for falls / SNOMED CT 575789588  Problem automatically updated based on documentation on History of Falls, History of Falls in last 3 months Umaña, Umaña Fall Risk Score and/or ADLs.  Resolved: At risk for infection / SNOMED CT 329218323  Problem automatically updated based on documentation on WBC and/or Neutro Auto.  Resolved: At risk of healthcare associated infection / SNOMED CT 2275231556  Problem automatically updated based on Central IV Access Type, Arterial Line Site, PA Catheter Type, Pacemaker Type.  Resolved: At risk of pressure sore / SNOMED CT 063386480  Resolved: Hyperlipidemia / SNOMED CT 58507328  Resolved: Muscle spasm of back / SNOMED CT [id number],    Active Problems (5)  Cancer of endometrium   CVA - Cerebrovascular accident   Encounter for antineoplastic chemotherapy   Mobility   Obesity         Physical Examination   Vital Signs   2/16/2022 9:45 CST       Temperature  Oral          37.1 DegC                             Temperature Oral (calculated)             98.78 DegF                             Peripheral Pulse Rate     95 bpm                             Respiratory Rate          18 br/min                             SpO2                      98 %                             Oxygen Therapy            Room air                             Systolic Blood Pressure   113 mmHg                             Diastolic Blood Pressure  77 mmHg                             Blood Pressure Location   Right arm                             Manual Cuff BP            No                             O2 SAT at rest            98 %     Measurements from flowsheet : Measurements   2/16/2022 9:45 CST       Weight Dosing             105.200 kg                             Weight Measured           105.2 kg                             Weight Measured and Calculated in Lbs     231.92 lb                             Height/Length Dosing      160.00 cm                             Height/Length Measured    160 cm                             BSA Measured              2.16 m2                             Body Mass Index Measured  41.09 kg/m2        Vital Signs (last 24 hrs)_____  Last Charted___________  Temp Oral     37.1 DegC  (FEB 16 09:45)  Heart Rate Peripheral   95 bpm  (FEB 16 09:45)  Resp Rate         18 br/min  (FEB 16 09:45)  SBP      113 mmHg  (FEB 16 09:45)  DBP      77 mmHg  (FEB 16 09:45)  SpO2      98 %  (FEB 16 09:45)  Weight      105.2 kg  (FEB 16 09:45)  Height      160 cm  (FEB 16 09:45)  BMI      41.09  (FEB 16 09:45)     General:  Alert and oriented, No acute distress.    Eye:  Pupils are equal, round and reactive to light, Extraocular movements are intact, Normal conjunctiva.    HENT:  Normocephalic, Oral mucosa is moist.    Neck:  Supple, No lymphadenopathy.    Respiratory:  Lungs are clear to auscultation, Respirations are non-labored, Breath sounds are equal.    Cardiovascular:  Normal  rate, Regular rhythm, No edema.    Gastrointestinal:  Soft, Non-tender, Non-distended, Normal bowel sounds.    Integumentary:  Warm, Dry, Pink, Intact.    Neurologic:  Alert, Oriented, Normal sensory, No focal deficits.    Psychiatric:  Cooperative, Appropriate mood & affect.    Lymphatics:  No lymphadenopathy neck, axilla, groin.    Musculoskeletal:  Normal gait.    Cognition and Speech:  Oriented, Speech clear and coherent.    ECOG Performance Scale: 1- Strenuous physical activity restricted; fully ambulatory and able to carry out light work.      Review / Management   Results review:  Lab results   2/16/2022 10:11 CST      Sodium Lvl                145 mmol/L                             Potassium Lvl             3.8 mmol/L                             Chloride                  108 mmol/L  HI                             CO2                       28 mmol/L                             Calcium Lvl               9.6 mg/dL                             Glucose Lvl               98 mg/dL                             Total Protein             7.0 gm/dL                             Albumin Lvl               4.0 gm/dL                             Globulin                  3.0 gm/dL                             A/G Ratio                 1.3 ratio    2/16/2022 8:37 CST       WBC                       5.2 x10(3)/mcL                             RBC                       3.62 x10(6)/mcL  LOW                             Hgb                       11.6 gm/dL  LOW                             Hct                       35.7 %  LOW                             Platelet                  296 x10(3)/mcL                             MCV                       98.6 fL  HI                             MCH                       32.0 pg  HI                             MCHC                      32.5 gm/dL  LOW                             RDW                       13.5 %                             MPV                       9.0 fL  LOW                              Abs Neut                  3.75 x10(3)/mcL                             NEUT%                     72.7 %  NA                             NEUT#                     3.8 x10(3)/mcL                             LYMPH%                    17.2 %  NA                             LYMPH#                    0.9 x10(3)/mcL                             MONO%                     9.1 %  NA                             MONO#                     0.5 x10(3)/mcL                             EOS%                      0.6 %  NA                             EOS#                      0.0 x10(3)/mcL                             BASO%                     0.2 %  NA                             BASO#                     0.0 x10(3)/mcL  .       Impression and Plan   Diagnoses:  1.  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  pT1a, N0, M0 stage IA endometrial cancer diagnosed 8/18/2021    **An addendum to the pathology report from 9/7/2021 internal 9/17/2021 states that the pathologist and the submitting physician's physician assistant had a discussion, and the possibility that the ovary is a metastatic lesion from the endometrium was discussed with a metastasis to the ovary being favored by the clinician.  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  I do not feel that this would change my management, will proceed with 6 cycles of adjuvant carboplatin and paclitaxel.    Plan:  As mentioned above, the patient has either a stage III uterine cancer or a stage I uterine cancer in stage II ovarian cancer.  Regardless, I think the patient would benefit from 6 cycles of adjuvant carbo/taxol.  I do not think the radiation would be of much benefit for her at this point in time.    Started treatment on 10/27/2021.  Cycle 6-day 1 to be  delivered on 2/16/2022.    Return to clinic in 8 weeks for end of treatment CT scan of the chest, abdomen, and pelvis.  Labs: CBC, CMP same day lab    All questions were answered to the best of my ability and she understands the plan moving forward.    Dakn Raymundo II, MD

## 2022-06-28 DIAGNOSIS — C54.1 CANCER OF ENDOMETRIUM: Primary | ICD-10-CM

## 2022-07-06 ENCOUNTER — HOSPITAL ENCOUNTER (OUTPATIENT)
Dept: RADIOLOGY | Facility: HOSPITAL | Age: 37
Discharge: HOME | End: 2022-07-06
Attending: INTERNAL MEDICINE
Payer: COMMERCIAL

## 2022-07-06 DIAGNOSIS — C54.1 ENDOMETRIAL CANCER: ICD-10-CM

## 2022-07-06 PROCEDURE — 25500020 PHARM REV CODE 255: Performed by: INTERNAL MEDICINE

## 2022-07-06 PROCEDURE — 71260 CT THORAX DX C+: CPT | Mod: TC

## 2022-07-06 PROCEDURE — 74177 CT ABD & PELVIS W/CONTRAST: CPT | Mod: TC

## 2022-07-06 RX ADMIN — IOPAMIDOL 100 ML: 755 INJECTION, SOLUTION INTRAVENOUS at 10:07

## 2022-07-06 RX ADMIN — DIATRIZOATE MEGLUMINE AND DIATRIZOATE SODIUM 30 ML: 660; 100 LIQUID ORAL; RECTAL at 10:07

## 2022-07-08 ENCOUNTER — LAB VISIT (OUTPATIENT)
Dept: LAB | Facility: HOSPITAL | Age: 37
End: 2022-07-08
Attending: INTERNAL MEDICINE
Payer: COMMERCIAL

## 2022-07-08 DIAGNOSIS — C54.1 CANCER OF ENDOMETRIUM: ICD-10-CM

## 2022-07-08 LAB
ALBUMIN SERPL-MCNC: 3.9 GM/DL (ref 3.5–5)
ALBUMIN/GLOB SERPL: 1.4 RATIO (ref 1.1–2)
ALP SERPL-CCNC: 101 UNIT/L (ref 40–150)
ALT SERPL-CCNC: 32 UNIT/L (ref 0–55)
AST SERPL-CCNC: 18 UNIT/L (ref 5–34)
BASOPHILS # BLD AUTO: 0.02 X10(3)/MCL (ref 0–0.2)
BASOPHILS NFR BLD AUTO: 0.3 %
BILIRUBIN DIRECT+TOT PNL SERPL-MCNC: 0.5 MG/DL
BUN SERPL-MCNC: 11.8 MG/DL (ref 7–18.7)
CALCIUM SERPL-MCNC: 9.7 MG/DL (ref 8.4–10.2)
CANCER AG125 SERPL-ACNC: 8.4 UNIT/ML (ref 0–35)
CHLORIDE SERPL-SCNC: 108 MMOL/L (ref 98–107)
CO2 SERPL-SCNC: 26 MMOL/L (ref 22–29)
CREAT SERPL-MCNC: 0.64 MG/DL (ref 0.55–1.02)
EOSINOPHIL # BLD AUTO: 0.13 X10(3)/MCL (ref 0–0.9)
EOSINOPHIL NFR BLD AUTO: 1.9 %
ERYTHROCYTE [DISTWIDTH] IN BLOOD BY AUTOMATED COUNT: 12.5 % (ref 11.5–17)
GLOBULIN SER-MCNC: 2.7 GM/DL (ref 2.4–3.5)
GLUCOSE SERPL-MCNC: 90 MG/DL (ref 74–100)
HCT VFR BLD AUTO: 40.1 % (ref 37–47)
HGB BLD-MCNC: 12.8 GM/DL (ref 12–16)
IMM GRANULOCYTES # BLD AUTO: 0.02 X10(3)/MCL (ref 0–0.04)
IMM GRANULOCYTES NFR BLD AUTO: 0.3 %
LYMPHOCYTES # BLD AUTO: 1.2 X10(3)/MCL (ref 0.6–4.6)
LYMPHOCYTES NFR BLD AUTO: 17.6 %
MCH RBC QN AUTO: 30.5 PG (ref 27–31)
MCHC RBC AUTO-ENTMCNC: 31.9 MG/DL (ref 33–36)
MCV RBC AUTO: 95.7 FL (ref 80–94)
MONOCYTES # BLD AUTO: 0.48 X10(3)/MCL (ref 0.1–1.3)
MONOCYTES NFR BLD AUTO: 7 %
NEUTROPHILS # BLD AUTO: 5 X10(3)/MCL (ref 2.1–9.2)
NEUTROPHILS NFR BLD AUTO: 72.9 %
PLATELET # BLD AUTO: 315 X10(3)/MCL (ref 130–400)
PMV BLD AUTO: 9.2 FL (ref 7.4–10.4)
POTASSIUM SERPL-SCNC: 3.6 MMOL/L (ref 3.5–5.1)
PROT SERPL-MCNC: 6.6 GM/DL (ref 6.4–8.3)
RBC # BLD AUTO: 4.19 X10(6)/MCL (ref 4.2–5.4)
SODIUM SERPL-SCNC: 143 MMOL/L (ref 136–145)
WBC # SPEC AUTO: 6.8 X10(3)/MCL (ref 4.5–11.5)

## 2022-07-08 PROCEDURE — 80053 COMPREHEN METABOLIC PANEL: CPT

## 2022-07-08 PROCEDURE — 86304 IMMUNOASSAY TUMOR CA 125: CPT

## 2022-07-08 PROCEDURE — 85025 COMPLETE CBC W/AUTO DIFF WBC: CPT

## 2022-07-08 PROCEDURE — 36415 COLL VENOUS BLD VENIPUNCTURE: CPT

## 2022-07-12 PROBLEM — C56.1 MALIGNANT NEOPLASM OF RIGHT OVARY: Status: ACTIVE | Noted: 2022-07-12

## 2022-07-12 PROBLEM — C54.1 ENDOMETRIAL CANCER: Status: ACTIVE | Noted: 2022-07-12

## 2022-07-12 NOTE — PROGRESS NOTES
Subjective:       Patient ID: Ruben Clements is a 37 y.o. female.    Chief Complaint: Follow-up (3 month follow up. Patient stated no new problems )    Medical oncologist in [place]:  Jonas Velazquez MD  GYN/ONC:  Darryn Pires MD     Diagnosis:  1.  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  pT1a, N0, M0 stage IA endometrial cancer diagnosed 8/18/2021     **An addendum to the pathology report from 9/7/2021 internal 9/17/2021 states that the pathologist and the submitting physician's physician assistant had a discussion, and the possibility that the ovary is a metastatic lesion from the endometrium was discussed with a metastasis to the ovary being favored by the clinician.  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  I do not feel that this would change my management, will proceed with 6 cycles of adjuvant carboplatin and paclitaxel.     Current Treatment:   1.    Treatment History:  1.  Adjuvant carboplatin and paclitaxel given every 3 weeks for 6 cycles, started on 10/27/2021.  Completed on 2/16/2022.      HPI  Patient who was having irregular menses, pelvic pain, abdominal pain along with nausea and vomiting.  She presented to see her OB/GYN, imaging was done that showed ovarian cyst.  She underwent a right oophorectomy on 6/8/2021, pathology revealed a 15 cm well-differentiated endometrioid adenocarcinoma involving the right ovary, right fallopian tube was uninvolved.  She was then referred to Dr. Darryn Pires with GYN oncology in [place], [place].  On 7/16/2021, she underwent Terra hereditary cancer test, this was negative for pathogenic mutations.  Her [facility] breast cancer risk assessment was 13.9% (general population was 13.2%, no significant increased risk compared to general  population).  She underwent an endometrial biopsy on 8/18/2021 that revealed FIGO grade 1 endometrioid endometrial adenocarcinoma.  She then underwent a robotic hysterectomy, left oophorectomy, bilateral pelvic sentinel node dissection with partial omentectomy and peritoneal biopsies of cul-de-sac nodules.  Pathology revealed endometrioid endometrial adenocarcinoma, FIGO 1 with less than 50% invasion.  Margins were negative, lymph nodes were negative, cul-de-sac nodules were positive for metastatic endometrial carcinoma.  Originally, it was discussed that the cul-de-sac lesions were from her ovarian cancer, this would stage her right ovarian cancer as a pT2b, N0, M0 stage IIB and would stage her endometrial cancer as a pT1a, N0, M0 stage IA.  Per an addendum on 9/17/2021, further discussion suggested a pT3a, N0, M0 stage IIIA endometrial cancer.  Patient was seen by Dr. Velazquez on 9/23/2021, plan was to treat with adjuvant carbo Taxol for 6 cycles.  Due to the fact that the patient lives in [place], [place], it was decided to refer to an oncologist in [place], [place] as this was closer to home.  I initially saw the patient on 10/18/2021 at that visit, she stated to overall be feeling well.  She did have some numbness and tingling, also some depression and anxiety, but had no other major issues to discuss.  Patient started her treatment on 10/27/2021, completed on 2/16/2022.  Surveillance CT scans of the chest, abdomen, and pelvis started on 4/11/2022, these showed no evidence of disease.             Interval History:   Patient presents to clinic for scheduled follow up appointment.  She overall is doing well from a cancer standpoint.  She did lose her dad in March, and work has been stressful for her.    Past Medical History:   Diagnosis Date    CVA (cerebral vascular accident)     Endometrial cancer     Malignant neoplasm of right ovary       Past Surgical History:   Procedure Laterality Date     ADENOIDECTOMY      CHOLECYSTECTOMY      HYSTERECTOMY      SALPINGOOPHORECTOMY      TONSILLECTOMY       Social History     Socioeconomic History    Marital status: Unknown   Tobacco Use    Smoking status: Never Smoker    Smokeless tobacco: Never Used   Substance and Sexual Activity    Alcohol use: Yes     Comment: 1-2 times per month    Drug use: Never      Family History   Problem Relation Age of Onset    Diabetes Mother     Hypertension Mother     Depression Mother     Depression Father     Diabetes Father     COPD Maternal Grandmother     Colon cancer Maternal Grandfather       Review of patient's allergies indicates:  No Known Allergies   Review of Systems   Constitutional: Negative for chills, diaphoresis, fatigue, fever and unexpected weight change.   HENT: Negative for nasal congestion, mouth sores, sinus pressure/congestion and sore throat.    Eyes: Negative for pain and visual disturbance.   Respiratory: Negative for cough, chest tightness and shortness of breath.    Cardiovascular: Negative for chest pain, palpitations and leg swelling.   Gastrointestinal: Negative for abdominal distention, abdominal pain, blood in stool, constipation and diarrhea.   Genitourinary: Negative for dysuria, frequency and hematuria.   Musculoskeletal: Negative for arthralgias and back pain.   Integumentary:  Negative for rash.   Neurological: Negative for dizziness, weakness, numbness and headaches.   Hematological: Negative for adenopathy.   Psychiatric/Behavioral: Negative for confusion.         Objective:      Physical Exam  Vitals reviewed. Exam conducted with a chaperone present.   Constitutional:       General: She is not in acute distress.     Appearance: Normal appearance.   HENT:      Head: Normocephalic and atraumatic.      Nose: Nose normal.      Mouth/Throat:      Mouth: Mucous membranes are moist.   Eyes:      Extraocular Movements: Extraocular movements intact.      Conjunctiva/sclera: Conjunctivae  normal.   Cardiovascular:      Rate and Rhythm: Normal rate and regular rhythm.      Pulses: Normal pulses.      Heart sounds: Normal heart sounds.   Pulmonary:      Effort: Pulmonary effort is normal.      Breath sounds: Normal breath sounds.   Abdominal:      General: Bowel sounds are normal.      Palpations: Abdomen is soft.   Musculoskeletal:         General: No swelling. Normal range of motion.      Cervical back: Normal range of motion and neck supple.      Right lower leg: No edema.      Left lower leg: No edema.   Lymphadenopathy:      Cervical: No cervical adenopathy.   Skin:     General: Skin is warm and dry.   Neurological:      General: No focal deficit present.      Mental Status: She is alert and oriented to person, place, and time. Mental status is at baseline.   Psychiatric:         Mood and Affect: Mood normal.         Behavior: Behavior normal.         LABS AND IMAGING REVIEWED IN EPIC          Assessment:     1.  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  pT1a, N0, M0 stage IA endometrial cancer diagnosed 8/18/2021     **An addendum to the pathology report from 9/7/2021 internal 9/17/2021 states that the pathologist and the submitting physician's physician assistant had a discussion, and the possibility that the ovary is a metastatic lesion from the endometrium was discussed with a metastasis to the ovary being favored by the clinician.  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  I do not feel that this would change my management, will proceed with 6 cycles of adjuvant carboplatin and paclitaxel.        Plan:       As mentioned above, the patient has either a stage III uterine cancer or a stage I uterine cancer in stage II ovarian cancer.  Regardless, was felt that the patient  would benefit from 6 cycles of adjuvant carboplatin/Taxol, she completed this on 2/16/2022.     Started treatment on 10/27/2021.  Cycle 6-day 1 to be delivered on 2/16/2022.     CT scans and tumor marker within normal limits without evidence of disease.     Return to clinic in 3 months with repeat scans and labs including Ca125.     Labs: CBC, CMP, CA-125     All questions were answered to the best of my ability and she understands the plan moving forward.      Dank Raymundo II, MD

## 2022-07-13 ENCOUNTER — OFFICE VISIT (OUTPATIENT)
Dept: HEMATOLOGY/ONCOLOGY | Facility: CLINIC | Age: 37
End: 2022-07-13
Payer: COMMERCIAL

## 2022-07-13 VITALS
BODY MASS INDEX: 40.28 KG/M2 | SYSTOLIC BLOOD PRESSURE: 110 MMHG | OXYGEN SATURATION: 100 % | TEMPERATURE: 98 F | DIASTOLIC BLOOD PRESSURE: 72 MMHG | HEIGHT: 63 IN | WEIGHT: 227.31 LBS | RESPIRATION RATE: 18 BRPM | HEART RATE: 73 BPM

## 2022-07-13 DIAGNOSIS — C54.1 ENDOMETRIAL CANCER: ICD-10-CM

## 2022-07-13 DIAGNOSIS — C56.1 MALIGNANT NEOPLASM OF RIGHT OVARY: ICD-10-CM

## 2022-07-13 PROCEDURE — 1159F PR MEDICATION LIST DOCUMENTED IN MEDICAL RECORD: ICD-10-PCS | Mod: CPTII,S$GLB,, | Performed by: INTERNAL MEDICINE

## 2022-07-13 PROCEDURE — 99999 PR PBB SHADOW E&M-EST. PATIENT-LVL IV: ICD-10-PCS | Mod: PBBFAC,,, | Performed by: INTERNAL MEDICINE

## 2022-07-13 PROCEDURE — 3078F PR MOST RECENT DIASTOLIC BLOOD PRESSURE < 80 MM HG: ICD-10-PCS | Mod: CPTII,S$GLB,, | Performed by: INTERNAL MEDICINE

## 2022-07-13 PROCEDURE — 99214 OFFICE O/P EST MOD 30 MIN: CPT | Mod: S$GLB,,, | Performed by: INTERNAL MEDICINE

## 2022-07-13 PROCEDURE — 3008F PR BODY MASS INDEX (BMI) DOCUMENTED: ICD-10-PCS | Mod: CPTII,S$GLB,, | Performed by: INTERNAL MEDICINE

## 2022-07-13 PROCEDURE — 1160F RVW MEDS BY RX/DR IN RCRD: CPT | Mod: CPTII,S$GLB,, | Performed by: INTERNAL MEDICINE

## 2022-07-13 PROCEDURE — 3074F SYST BP LT 130 MM HG: CPT | Mod: CPTII,S$GLB,, | Performed by: INTERNAL MEDICINE

## 2022-07-13 PROCEDURE — 99999 PR PBB SHADOW E&M-EST. PATIENT-LVL IV: CPT | Mod: PBBFAC,,, | Performed by: INTERNAL MEDICINE

## 2022-07-13 PROCEDURE — 1160F PR REVIEW ALL MEDS BY PRESCRIBER/CLIN PHARMACIST DOCUMENTED: ICD-10-PCS | Mod: CPTII,S$GLB,, | Performed by: INTERNAL MEDICINE

## 2022-07-13 PROCEDURE — 1159F MED LIST DOCD IN RCRD: CPT | Mod: CPTII,S$GLB,, | Performed by: INTERNAL MEDICINE

## 2022-07-13 PROCEDURE — 3008F BODY MASS INDEX DOCD: CPT | Mod: CPTII,S$GLB,, | Performed by: INTERNAL MEDICINE

## 2022-07-13 PROCEDURE — 99214 PR OFFICE/OUTPT VISIT, EST, LEVL IV, 30-39 MIN: ICD-10-PCS | Mod: S$GLB,,, | Performed by: INTERNAL MEDICINE

## 2022-07-13 PROCEDURE — 3074F PR MOST RECENT SYSTOLIC BLOOD PRESSURE < 130 MM HG: ICD-10-PCS | Mod: CPTII,S$GLB,, | Performed by: INTERNAL MEDICINE

## 2022-07-13 PROCEDURE — 3078F DIAST BP <80 MM HG: CPT | Mod: CPTII,S$GLB,, | Performed by: INTERNAL MEDICINE

## 2022-07-13 RX ORDER — BUPROPION HYDROCHLORIDE 150 MG/1
TABLET ORAL
COMMUNITY
Start: 2022-06-30 | End: 2022-10-18

## 2022-07-13 RX ORDER — CETIRIZINE HYDROCHLORIDE 10 MG/1
10 TABLET ORAL
COMMUNITY

## 2022-07-13 RX ORDER — KETOCONAZOLE 20 MG/ML
SHAMPOO, SUSPENSION TOPICAL
COMMUNITY
Start: 2022-07-01

## 2022-07-13 RX ORDER — FLUOXETINE HYDROCHLORIDE 20 MG/1
20 CAPSULE ORAL DAILY
COMMUNITY
Start: 2022-07-01

## 2022-07-13 RX ORDER — NAPROXEN SODIUM 220 MG/1
81 TABLET, FILM COATED ORAL
COMMUNITY

## 2022-10-12 ENCOUNTER — HOSPITAL ENCOUNTER (OUTPATIENT)
Dept: RADIOLOGY | Facility: HOSPITAL | Age: 37
Discharge: HOME | End: 2022-10-12
Attending: INTERNAL MEDICINE
Payer: COMMERCIAL

## 2022-10-12 DIAGNOSIS — C56.1 MALIGNANT NEOPLASM OF RIGHT OVARY: ICD-10-CM

## 2022-10-12 DIAGNOSIS — C54.1 ENDOMETRIAL CANCER: ICD-10-CM

## 2022-10-12 PROCEDURE — 74177 CT ABD & PELVIS W/CONTRAST: CPT | Mod: TC

## 2022-10-12 PROCEDURE — 25500020 PHARM REV CODE 255: Performed by: INTERNAL MEDICINE

## 2022-10-12 PROCEDURE — 71260 CT THORAX DX C+: CPT | Mod: TC

## 2022-10-12 RX ADMIN — IOPAMIDOL 100 ML: 755 INJECTION, SOLUTION INTRAVENOUS at 08:10

## 2022-10-12 RX ADMIN — DIATRIZOATE MEGLUMINE AND DIATRIZOATE SODIUM 30 ML: 660; 100 LIQUID ORAL; RECTAL at 08:10

## 2022-10-18 ENCOUNTER — OFFICE VISIT (OUTPATIENT)
Dept: HEMATOLOGY/ONCOLOGY | Facility: CLINIC | Age: 37
End: 2022-10-18
Payer: COMMERCIAL

## 2022-10-18 ENCOUNTER — INFUSION (OUTPATIENT)
Dept: INFUSION THERAPY | Facility: HOSPITAL | Age: 37
End: 2022-10-18
Attending: INTERNAL MEDICINE
Payer: COMMERCIAL

## 2022-10-18 VITALS
WEIGHT: 227.94 LBS | OXYGEN SATURATION: 100 % | RESPIRATION RATE: 18 BRPM | HEART RATE: 73 BPM | BODY MASS INDEX: 40.39 KG/M2 | DIASTOLIC BLOOD PRESSURE: 74 MMHG | HEIGHT: 63 IN | SYSTOLIC BLOOD PRESSURE: 113 MMHG | TEMPERATURE: 98 F

## 2022-10-18 DIAGNOSIS — C56.1 MALIGNANT NEOPLASM OF RIGHT OVARY: Primary | ICD-10-CM

## 2022-10-18 DIAGNOSIS — C54.1 ENDOMETRIAL CANCER: ICD-10-CM

## 2022-10-18 PROCEDURE — 1159F MED LIST DOCD IN RCRD: CPT | Mod: CPTII,S$GLB,, | Performed by: INTERNAL MEDICINE

## 2022-10-18 PROCEDURE — 99999 PR PBB SHADOW E&M-EST. PATIENT-LVL IV: ICD-10-PCS | Mod: PBBFAC,,, | Performed by: INTERNAL MEDICINE

## 2022-10-18 PROCEDURE — 99214 OFFICE O/P EST MOD 30 MIN: CPT | Mod: S$GLB,,, | Performed by: INTERNAL MEDICINE

## 2022-10-18 PROCEDURE — 1159F PR MEDICATION LIST DOCUMENTED IN MEDICAL RECORD: ICD-10-PCS | Mod: CPTII,S$GLB,, | Performed by: INTERNAL MEDICINE

## 2022-10-18 PROCEDURE — 1160F RVW MEDS BY RX/DR IN RCRD: CPT | Mod: CPTII,S$GLB,, | Performed by: INTERNAL MEDICINE

## 2022-10-18 PROCEDURE — 63600175 PHARM REV CODE 636 W HCPCS: Performed by: INTERNAL MEDICINE

## 2022-10-18 PROCEDURE — 3078F DIAST BP <80 MM HG: CPT | Mod: CPTII,S$GLB,, | Performed by: INTERNAL MEDICINE

## 2022-10-18 PROCEDURE — 3074F SYST BP LT 130 MM HG: CPT | Mod: CPTII,S$GLB,, | Performed by: INTERNAL MEDICINE

## 2022-10-18 PROCEDURE — 99999 PR PBB SHADOW E&M-EST. PATIENT-LVL IV: CPT | Mod: PBBFAC,,, | Performed by: INTERNAL MEDICINE

## 2022-10-18 PROCEDURE — 3078F PR MOST RECENT DIASTOLIC BLOOD PRESSURE < 80 MM HG: ICD-10-PCS | Mod: CPTII,S$GLB,, | Performed by: INTERNAL MEDICINE

## 2022-10-18 PROCEDURE — 99214 PR OFFICE/OUTPT VISIT, EST, LEVL IV, 30-39 MIN: ICD-10-PCS | Mod: S$GLB,,, | Performed by: INTERNAL MEDICINE

## 2022-10-18 PROCEDURE — 96523 IRRIG DRUG DELIVERY DEVICE: CPT

## 2022-10-18 PROCEDURE — 3074F PR MOST RECENT SYSTOLIC BLOOD PRESSURE < 130 MM HG: ICD-10-PCS | Mod: CPTII,S$GLB,, | Performed by: INTERNAL MEDICINE

## 2022-10-18 PROCEDURE — 1160F PR REVIEW ALL MEDS BY PRESCRIBER/CLIN PHARMACIST DOCUMENTED: ICD-10-PCS | Mod: CPTII,S$GLB,, | Performed by: INTERNAL MEDICINE

## 2022-10-18 RX ORDER — HEPARIN 100 UNIT/ML
500 SYRINGE INTRAVENOUS
Status: CANCELLED | OUTPATIENT
Start: 2023-01-01

## 2022-10-18 RX ORDER — HEPARIN 100 UNIT/ML
500 SYRINGE INTRAVENOUS
Status: DISCONTINUED | OUTPATIENT
Start: 2022-10-18 | End: 2022-10-18 | Stop reason: HOSPADM

## 2022-10-18 RX ORDER — SODIUM CHLORIDE 0.9 % (FLUSH) 0.9 %
10 SYRINGE (ML) INJECTION
Status: DISCONTINUED | OUTPATIENT
Start: 2022-10-18 | End: 2022-10-18 | Stop reason: HOSPADM

## 2022-10-18 RX ORDER — SODIUM CHLORIDE 0.9 % (FLUSH) 0.9 %
10 SYRINGE (ML) INJECTION
Status: CANCELLED | OUTPATIENT
Start: 2023-01-01

## 2022-10-18 RX ORDER — HEPARIN 100 UNIT/ML
500 SYRINGE INTRAVENOUS
Status: CANCELLED | OUTPATIENT
Start: 2022-10-18

## 2022-10-18 RX ORDER — SODIUM CHLORIDE 0.9 % (FLUSH) 0.9 %
10 SYRINGE (ML) INJECTION
Status: CANCELLED | OUTPATIENT
Start: 2022-10-18

## 2022-10-18 RX ADMIN — HEPARIN 500 UNITS: 100 SYRINGE at 11:10

## 2022-10-18 NOTE — PROGRESS NOTES
Subjective:       Patient ID: Ruben Clements is a 37 y.o. female.    Chief Complaint: Follow-up (Patient stated that she has been feeling really fatigued. )    Medical oncologist in [place]:  Jonas Velazquez MD  GYN/ONC:  Darryn Pires MD     Diagnosis:  1.  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  pT1a, N0, M0 stage IA endometrial cancer diagnosed 8/18/2021     **An addendum to the pathology report from 9/7/2021 internal 9/17/2021 states that the pathologist and the submitting physician's physician assistant had a discussion, and the possibility that the ovary is a metastatic lesion from the endometrium was discussed with a metastasis to the ovary being favored by the clinician.  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  I do not feel that this would change my management, will proceed with 6 cycles of adjuvant carboplatin and paclitaxel.     Current Treatment:   Observation    Treatment History:  1.  Adjuvant carboplatin and paclitaxel given every 3 weeks for 6 cycles, started on 10/27/2021.  Completed on 2/16/2022.      HPI:  Patient who was having irregular menses, pelvic pain, abdominal pain along with nausea and vomiting.  She presented to see her OB/GYN, imaging was done that showed ovarian cyst.  She underwent a right oophorectomy on 6/8/2021, pathology revealed a 15 cm well-differentiated endometrioid adenocarcinoma involving the right ovary, right fallopian tube was uninvolved.  She was then referred to Dr. Darryn Pires with GYN oncology in [place], [place].  On 7/16/2021, she underwent Terra hereditary cancer test, this was negative for pathogenic mutations.  Her [facility] breast cancer risk assessment was 13.9% (general population was 13.2%, no significant increased risk  compared to general population).  She underwent an endometrial biopsy on 8/18/2021 that revealed FIGO grade 1 endometrioid endometrial adenocarcinoma.  She then underwent a robotic hysterectomy, left oophorectomy, bilateral pelvic sentinel node dissection with partial omentectomy and peritoneal biopsies of cul-de-sac nodules.  Pathology revealed endometrioid endometrial adenocarcinoma, FIGO 1 with less than 50% invasion.  Margins were negative, lymph nodes were negative, cul-de-sac nodules were positive for metastatic endometrial carcinoma.  Originally, it was discussed that the cul-de-sac lesions were from her ovarian cancer, this would stage her right ovarian cancer as a pT2b, N0, M0 stage IIB and would stage her endometrial cancer as a pT1a, N0, M0 stage IA.  Per an addendum on 9/17/2021, further discussion suggested a pT3a, N0, M0 stage IIIA endometrial cancer.  Patient was seen by Dr. Velazquez on 9/23/2021, plan was to treat with adjuvant carbo Taxol for 6 cycles.  Due to the fact that the patient lives in [place], [place], it was decided to refer to an oncologist in [place], [place] as this was closer to home.  I initially saw the patient on 10/18/2021 at that visit, she stated to overall be feeling well.  She did have some numbness and tingling, also some depression and anxiety, but had no other major issues to discuss.  Patient started her treatment on 10/27/2021, completed on 2/16/2022.  Surveillance CT scans of the chest, abdomen, and pelvis started on 4/11/2022, these showed no evidence of disease. Most recently done on 10/12/2022.       Interval History:   Patient presents to clinic for scheduled follow up appointment.  She overall is doing well from a cancer standpoint.  She complains of fatigue and thinks it could be related to depression or work.  He denies any vaginal bleeding or any other type of bleeding.  Other than fatigue and depression, she feels well.        Past Medical History:    Diagnosis Date    CVA (cerebral vascular accident)     Endometrial cancer     Malignant neoplasm of right ovary       Past Surgical History:   Procedure Laterality Date    ADENOIDECTOMY      CHOLECYSTECTOMY      HYSTERECTOMY      SALPINGOOPHORECTOMY      TONSILLECTOMY       Social History     Socioeconomic History    Marital status: Unknown   Tobacco Use    Smoking status: Never    Smokeless tobacco: Never   Substance and Sexual Activity    Alcohol use: Not Currently     Comment: 1-2 times per month    Drug use: Never    Sexual activity: Not Currently     Birth control/protection: Abstinence      Family History   Problem Relation Age of Onset    Diabetes Mother     Hypertension Mother     Depression Mother     Depression Father     Diabetes Father     Early death Father         Heart failure    Stroke Father     COPD Maternal Grandmother         CHF and COPD    Colon cancer Maternal Grandfather     Cancer Maternal Grandfather         Colon and Prostate    Depression Sister     Depression Sister     Diabetes Sister       Review of patient's allergies indicates:  No Known Allergies   Review of Systems   Constitutional:  Negative for appetite change and unexpected weight change.   HENT:  Negative for mouth sores.    Eyes:  Negative for visual disturbance.   Respiratory:  Negative for cough and shortness of breath.    Cardiovascular:  Negative for chest pain.   Gastrointestinal:  Positive for abdominal pain. Negative for diarrhea.   Genitourinary:  Negative for frequency.   Musculoskeletal:  Positive for back pain.   Integumentary:  Negative for rash.   Neurological:  Negative for headaches.   Hematological:  Negative for adenopathy.   Psychiatric/Behavioral:  The patient is not nervous/anxious.        Objective:      Physical Exam  Vitals reviewed. Exam conducted with a chaperone present.   Constitutional:       General: She is not in acute distress.     Appearance: Normal appearance.   HENT:      Head: Normocephalic  and atraumatic.      Nose: Nose normal.      Mouth/Throat:      Mouth: Mucous membranes are moist.   Eyes:      Extraocular Movements: Extraocular movements intact.      Conjunctiva/sclera: Conjunctivae normal.   Cardiovascular:      Rate and Rhythm: Normal rate and regular rhythm.      Pulses: Normal pulses.      Heart sounds: Normal heart sounds.   Pulmonary:      Effort: Pulmonary effort is normal.      Breath sounds: Normal breath sounds.   Abdominal:      General: Bowel sounds are normal.      Palpations: Abdomen is soft.   Musculoskeletal:         General: No swelling. Normal range of motion.      Cervical back: Normal range of motion and neck supple.      Right lower leg: No edema.      Left lower leg: No edema.   Lymphadenopathy:      Cervical: No cervical adenopathy.   Skin:     General: Skin is warm and dry.   Neurological:      General: No focal deficit present.      Mental Status: She is alert and oriented to person, place, and time. Mental status is at baseline.   Psychiatric:         Mood and Affect: Mood normal.         Behavior: Behavior normal.       LABS AND IMAGING REVIEWED IN EPIC          Assessment:     1.  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  pT1a, N0, M0 stage IA endometrial cancer diagnosed 8/18/2021     **An addendum to the pathology report from 9/7/2021 internal 9/17/2021 states that the pathologist and the submitting physician's physician assistant had a discussion, and the possibility that the ovary is a metastatic lesion from the endometrium was discussed with a metastasis to the ovary being favored by the clinician.  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  I do not feel that this would change my management, will proceed with 6 cycles of adjuvant carboplatin  and paclitaxel.        Plan:       As mentioned above, the patient has either a stage III uterine cancer or a stage I uterine cancer in stage II ovarian cancer.  Regardless, was felt that the patient would benefit from 6 cycles of adjuvant carboplatin/Taxol, she completed this on 2/16/2022.     Started treatment on 10/27/2021.  Cycle 6-day 1 to be delivered on 2/16/2022.     CT scans and tumor marker within normal limits without evidence of disease.  We will repeat scans in 3 months, if good, we will move visits out to 4 months.    Follow up with PCP    Will set up for [facility] flushes     Return to clinic in 3 months with repeat scans     Labs: CBC, CMP, CA-125     All questions were answered to the best of my ability and she understands the plan moving forward.      Dank Raymundo II, MD I, Reshma Man LPN, acted solely as a scribe for and in the presence of, Dr. Dank Raymundo who performed the service.

## 2022-12-07 ENCOUNTER — HOSPITAL ENCOUNTER (OUTPATIENT)
Dept: RADIOLOGY | Facility: HOSPITAL | Age: 37
Discharge: HOME | End: 2022-12-07
Attending: FAMILY MEDICINE
Payer: COMMERCIAL

## 2022-12-07 DIAGNOSIS — M25.572 PAIN IN LEFT ANKLE: ICD-10-CM

## 2022-12-07 PROCEDURE — 73610 X-RAY EXAM OF ANKLE: CPT | Mod: TC,LT

## 2023-01-11 ENCOUNTER — HOSPITAL ENCOUNTER (OUTPATIENT)
Dept: RADIOLOGY | Facility: HOSPITAL | Age: 38
Discharge: HOME | End: 2023-01-11
Attending: INTERNAL MEDICINE
Payer: COMMERCIAL

## 2023-01-11 DIAGNOSIS — C56.1 MALIGNANT NEOPLASM OF RIGHT OVARY: ICD-10-CM

## 2023-01-11 DIAGNOSIS — C54.1 ENDOMETRIAL CANCER: ICD-10-CM

## 2023-01-11 LAB
CREAT SERPL-MCNC: 0.6 MG/DL (ref 0.5–1.4)
SAMPLE: NORMAL

## 2023-01-11 PROCEDURE — 71260 CT THORAX DX C+: CPT | Mod: TC

## 2023-01-11 PROCEDURE — 74177 CT ABD & PELVIS W/CONTRAST: CPT | Mod: TC

## 2023-01-11 PROCEDURE — 25500020 PHARM REV CODE 255: Performed by: INTERNAL MEDICINE

## 2023-01-11 RX ADMIN — DIATRIZOATE MEGLUMINE AND DIATRIZOATE SODIUM 30 ML: 660; 100 LIQUID ORAL; RECTAL at 08:01

## 2023-01-11 RX ADMIN — IOPAMIDOL 100 ML: 755 INJECTION, SOLUTION INTRAVENOUS at 09:01

## 2023-01-18 ENCOUNTER — INFUSION (OUTPATIENT)
Dept: INFUSION THERAPY | Facility: HOSPITAL | Age: 38
End: 2023-01-18
Attending: FAMILY MEDICINE
Payer: COMMERCIAL

## 2023-01-18 ENCOUNTER — OFFICE VISIT (OUTPATIENT)
Dept: HEMATOLOGY/ONCOLOGY | Facility: CLINIC | Age: 38
End: 2023-01-18
Payer: COMMERCIAL

## 2023-01-18 VITALS — HEIGHT: 63 IN | WEIGHT: 224.19 LBS | BODY MASS INDEX: 39.72 KG/M2

## 2023-01-18 VITALS
WEIGHT: 224.19 LBS | SYSTOLIC BLOOD PRESSURE: 105 MMHG | TEMPERATURE: 98 F | BODY MASS INDEX: 39.72 KG/M2 | OXYGEN SATURATION: 98 % | HEART RATE: 61 BPM | DIASTOLIC BLOOD PRESSURE: 71 MMHG | RESPIRATION RATE: 18 BRPM | HEIGHT: 63 IN

## 2023-01-18 DIAGNOSIS — C54.1 ENDOMETRIAL CANCER: ICD-10-CM

## 2023-01-18 DIAGNOSIS — C56.1 MALIGNANT NEOPLASM OF RIGHT OVARY: ICD-10-CM

## 2023-01-18 DIAGNOSIS — C54.1 ENDOMETRIAL CANCER: Primary | ICD-10-CM

## 2023-01-18 DIAGNOSIS — C56.1 MALIGNANT NEOPLASM OF RIGHT OVARY: Primary | ICD-10-CM

## 2023-01-18 PROCEDURE — 3078F PR MOST RECENT DIASTOLIC BLOOD PRESSURE < 80 MM HG: ICD-10-PCS | Mod: CPTII,S$GLB,, | Performed by: INTERNAL MEDICINE

## 2023-01-18 PROCEDURE — 1159F PR MEDICATION LIST DOCUMENTED IN MEDICAL RECORD: ICD-10-PCS | Mod: CPTII,S$GLB,, | Performed by: INTERNAL MEDICINE

## 2023-01-18 PROCEDURE — 3008F PR BODY MASS INDEX (BMI) DOCUMENTED: ICD-10-PCS | Mod: CPTII,S$GLB,, | Performed by: INTERNAL MEDICINE

## 2023-01-18 PROCEDURE — 3008F BODY MASS INDEX DOCD: CPT | Mod: CPTII,S$GLB,, | Performed by: INTERNAL MEDICINE

## 2023-01-18 PROCEDURE — 1159F MED LIST DOCD IN RCRD: CPT | Mod: CPTII,S$GLB,, | Performed by: INTERNAL MEDICINE

## 2023-01-18 PROCEDURE — 63600175 PHARM REV CODE 636 W HCPCS: Performed by: INTERNAL MEDICINE

## 2023-01-18 PROCEDURE — 99999 PR PBB SHADOW E&M-EST. PATIENT-LVL IV: CPT | Mod: PBBFAC,,, | Performed by: INTERNAL MEDICINE

## 2023-01-18 PROCEDURE — 99214 PR OFFICE/OUTPT VISIT, EST, LEVL IV, 30-39 MIN: ICD-10-PCS | Mod: S$GLB,,, | Performed by: INTERNAL MEDICINE

## 2023-01-18 PROCEDURE — 1160F RVW MEDS BY RX/DR IN RCRD: CPT | Mod: CPTII,S$GLB,, | Performed by: INTERNAL MEDICINE

## 2023-01-18 PROCEDURE — 3074F SYST BP LT 130 MM HG: CPT | Mod: CPTII,S$GLB,, | Performed by: INTERNAL MEDICINE

## 2023-01-18 PROCEDURE — 3074F PR MOST RECENT SYSTOLIC BLOOD PRESSURE < 130 MM HG: ICD-10-PCS | Mod: CPTII,S$GLB,, | Performed by: INTERNAL MEDICINE

## 2023-01-18 PROCEDURE — 96523 IRRIG DRUG DELIVERY DEVICE: CPT

## 2023-01-18 PROCEDURE — 1160F PR REVIEW ALL MEDS BY PRESCRIBER/CLIN PHARMACIST DOCUMENTED: ICD-10-PCS | Mod: CPTII,S$GLB,, | Performed by: INTERNAL MEDICINE

## 2023-01-18 PROCEDURE — 99214 OFFICE O/P EST MOD 30 MIN: CPT | Mod: S$GLB,,, | Performed by: INTERNAL MEDICINE

## 2023-01-18 PROCEDURE — 3078F DIAST BP <80 MM HG: CPT | Mod: CPTII,S$GLB,, | Performed by: INTERNAL MEDICINE

## 2023-01-18 PROCEDURE — 99999 PR PBB SHADOW E&M-EST. PATIENT-LVL IV: ICD-10-PCS | Mod: PBBFAC,,, | Performed by: INTERNAL MEDICINE

## 2023-01-18 RX ORDER — HEPARIN 100 UNIT/ML
500 SYRINGE INTRAVENOUS
Status: CANCELLED | OUTPATIENT
Start: 2023-04-01

## 2023-01-18 RX ORDER — MELOXICAM 15 MG/1
15 TABLET ORAL DAILY PRN
COMMUNITY
Start: 2023-01-06

## 2023-01-18 RX ORDER — HEPARIN 100 UNIT/ML
500 SYRINGE INTRAVENOUS
Status: DISCONTINUED | OUTPATIENT
Start: 2023-01-18 | End: 2023-01-18 | Stop reason: HOSPADM

## 2023-01-18 RX ORDER — CLONIDINE 0.1 MG/24H
PATCH, EXTENDED RELEASE TRANSDERMAL
COMMUNITY
Start: 2022-12-21

## 2023-01-18 RX ORDER — SODIUM CHLORIDE 0.9 % (FLUSH) 0.9 %
10 SYRINGE (ML) INJECTION
Status: CANCELLED | OUTPATIENT
Start: 2023-04-01

## 2023-01-18 RX ORDER — SODIUM CHLORIDE 0.9 % (FLUSH) 0.9 %
10 SYRINGE (ML) INJECTION
Status: DISCONTINUED | OUTPATIENT
Start: 2023-01-18 | End: 2023-01-18 | Stop reason: HOSPADM

## 2023-01-18 RX ADMIN — HEPARIN 500 UNITS: 100 SYRINGE at 11:01

## 2023-01-18 NOTE — PROGRESS NOTES
Subjective:       Patient ID: Ruben Clements is a 38 y.o. female.    Chief Complaint: Follow-up (Patient stated no new problems )      Medical oncologist in [place]:  Jonas Velazquez MD  GYN/ONC:  Darryn Pires MD     Diagnosis:  1.  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  pT1a, N0, M0 stage IA endometrial cancer diagnosed 8/18/2021     **An addendum to the pathology report from 9/7/2021 internal 9/17/2021 states that the pathologist and the submitting physician's physician assistant had a discussion, and the possibility that the ovary is a metastatic lesion from the endometrium was discussed with a metastasis to the ovary being favored by the clinician.  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  I do not feel that this would change my management, will proceed with 6 cycles of adjuvant carboplatin and paclitaxel.     Current Treatment:   Observation    Treatment History:  1.  Adjuvant carboplatin and paclitaxel given every 3 weeks for 6 cycles, started on 10/27/2021.  Completed on 2/16/2022.      HPI:  Patient who was having irregular menses, pelvic pain, abdominal pain along with nausea and vomiting.  She presented to see her OB/GYN, imaging was done that showed ovarian cyst.  She underwent a right oophorectomy on 6/8/2021, pathology revealed a 15 cm well-differentiated endometrioid adenocarcinoma involving the right ovary, right fallopian tube was uninvolved.  She was then referred to Dr. Darryn Pires with GYN oncology in [place], [place].  On 7/16/2021, she underwent Terra hereditary cancer test, this was negative for pathogenic mutations.  Her [facility] breast cancer risk assessment was 13.9% (general population was 13.2%, no significant increased risk compared to general  population).  She underwent an endometrial biopsy on 8/18/2021 that revealed FIGO grade 1 endometrioid endometrial adenocarcinoma.  She then underwent a robotic hysterectomy, left oophorectomy, bilateral pelvic sentinel node dissection with partial omentectomy and peritoneal biopsies of cul-de-sac nodules.  Pathology revealed endometrioid endometrial adenocarcinoma, FIGO 1 with less than 50% invasion.  Margins were negative, lymph nodes were negative, cul-de-sac nodules were positive for metastatic endometrial carcinoma.  Originally, it was discussed that the cul-de-sac lesions were from her ovarian cancer, this would stage her right ovarian cancer as a pT2b, N0, M0 stage IIB and would stage her endometrial cancer as a pT1a, N0, M0 stage IA.  Per an addendum on 9/17/2021, further discussion suggested a pT3a, N0, M0 stage IIIA endometrial cancer.  Patient was seen by Dr. Velazquez on 9/23/2021, plan was to treat with adjuvant carbo Taxol for 6 cycles.  Due to the fact that the patient lives in [place], [place], it was decided to refer to an oncologist in [place], [place] as this was closer to home.  I initially saw the patient on 10/18/2021 at that visit, she stated to overall be feeling well.  She did have some numbness and tingling, also some depression and anxiety, but had no other major issues to discuss.  Patient started her treatment on 10/27/2021, completed on 2/16/2022.  Surveillance CT scans of the chest, abdomen, and pelvis started on 4/11/2022, these showed no evidence of disease. Most recently done on 1/11/2023 showing new small site of sclerosis in the left anterior 2nd rib favored to be posttraumatic with no metastatic disease.        Interval History:   Patient presents to clinic for scheduled follow up appointment.  She overall is doing well from a cancer standpoint.  She complains of fatigue and thinks it could be related to depression or work.  She denies any vaginal bleeding or any other type of  bleeding.  Other than fatigue and depression, she feels well.        Past Medical History:   Diagnosis Date    CVA (cerebral vascular accident)     Endometrial cancer     Malignant neoplasm of right ovary       Past Surgical History:   Procedure Laterality Date    ADENOIDECTOMY      CHOLECYSTECTOMY      HYSTERECTOMY      SALPINGOOPHORECTOMY      TONSILLECTOMY       Social History     Socioeconomic History    Marital status: Unknown   Tobacco Use    Smoking status: Never    Smokeless tobacco: Never   Substance and Sexual Activity    Alcohol use: Not Currently     Comment: 1-2 times per month    Drug use: Never    Sexual activity: Not Currently     Birth control/protection: Abstinence      Family History   Problem Relation Age of Onset    Diabetes Mother     Hypertension Mother     Depression Mother     Depression Father     Diabetes Father     Early death Father         Heart failure    Stroke Father     COPD Maternal Grandmother         CHF and COPD    Colon cancer Maternal Grandfather     Cancer Maternal Grandfather         Colon and Prostate    Depression Sister     Depression Sister     Diabetes Sister       Review of patient's allergies indicates:  No Known Allergies   Review of Systems   Constitutional:  Negative for appetite change and unexpected weight change.   HENT:  Negative for mouth sores.    Eyes:  Negative for visual disturbance.   Respiratory:  Negative for cough and shortness of breath.    Cardiovascular:  Negative for chest pain.   Gastrointestinal:  Negative for abdominal pain and diarrhea.   Genitourinary:  Negative for frequency.   Musculoskeletal:  Negative for back pain.   Integumentary:  Negative for rash.   Neurological:  Negative for headaches.   Hematological:  Negative for adenopathy.   Psychiatric/Behavioral:  The patient is not nervous/anxious.        Objective:      Physical Exam  Vitals reviewed. Exam conducted with a chaperone present.   Constitutional:       General: She is not in  acute distress.     Appearance: Normal appearance.   HENT:      Head: Normocephalic and atraumatic.      Nose: Nose normal.      Mouth/Throat:      Mouth: Mucous membranes are moist.   Eyes:      Extraocular Movements: Extraocular movements intact.      Conjunctiva/sclera: Conjunctivae normal.   Cardiovascular:      Rate and Rhythm: Normal rate and regular rhythm.      Pulses: Normal pulses.      Heart sounds: Normal heart sounds.   Pulmonary:      Effort: Pulmonary effort is normal.      Breath sounds: Normal breath sounds.   Abdominal:      General: Bowel sounds are normal.      Palpations: Abdomen is soft.   Musculoskeletal:         General: No swelling. Normal range of motion.      Cervical back: Normal range of motion and neck supple.      Right lower leg: No edema.      Left lower leg: No edema.   Lymphadenopathy:      Cervical: No cervical adenopathy.   Skin:     General: Skin is warm and dry.   Neurological:      General: No focal deficit present.      Mental Status: She is alert and oriented to person, place, and time. Mental status is at baseline.   Psychiatric:         Mood and Affect: Mood normal.         Behavior: Behavior normal.       LABS AND IMAGING REVIEWED IN EPIC          Assessment:     1.  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  pT1a, N0, M0 stage IA endometrial cancer diagnosed 8/18/2021     **An addendum to the pathology report from 9/7/2021 internal 9/17/2021 states that the pathologist and the submitting physician's physician assistant had a discussion, and the possibility that the ovary is a metastatic lesion from the endometrium was discussed with a metastasis to the ovary being favored by the clinician.  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  I do not feel that  this would change my management, will proceed with 6 cycles of adjuvant carboplatin and paclitaxel.        Plan:       As mentioned above, the patient has either a stage III uterine cancer or a stage I uterine cancer in stage II ovarian cancer.  Regardless, was felt that the patient would benefit from 6 cycles of adjuvant carboplatin/Taxol, she completed this on 2/16/2022.     Started treatment on 10/27/2021.  Cycle 6-day 1 to be delivered on 2/16/2022.     CT scans and tumor marker within normal limits without evidence of disease.  We will repeat scans in 3 months, if good, we will move visits out to 4 months.    Continue medi-port flushes every 3 months- due today     Return to clinic in 3 months with repeat scans     Labs: CBC, CMP, CA-125     All questions were answered to the best of my ability and she understands the plan moving forward.      Dank Raymundo II, MD QUARLES, Reshma Man LPN, acted solely as a scribe for and in the presence of, Dr. Dank Raymundo who performed the service.

## 2023-02-16 DIAGNOSIS — M25.572 ANKLE PAIN, LEFT: ICD-10-CM

## 2023-02-16 DIAGNOSIS — M25.572 PAIN IN JOINT, ANKLE AND FOOT, LEFT: Primary | ICD-10-CM

## 2023-02-20 ENCOUNTER — HOSPITAL ENCOUNTER (OUTPATIENT)
Dept: RADIOLOGY | Facility: HOSPITAL | Age: 38
Discharge: HOME | End: 2023-02-20
Attending: FAMILY MEDICINE
Payer: COMMERCIAL

## 2023-02-20 DIAGNOSIS — M25.572 PAIN IN JOINT, ANKLE AND FOOT, LEFT: ICD-10-CM

## 2023-02-20 PROCEDURE — 73721 MRI JNT OF LWR EXTRE W/O DYE: CPT | Mod: TC,LT

## 2023-04-12 ENCOUNTER — HOSPITAL ENCOUNTER (OUTPATIENT)
Dept: RADIOLOGY | Facility: HOSPITAL | Age: 38
Discharge: HOME | End: 2023-04-12
Attending: INTERNAL MEDICINE
Payer: COMMERCIAL

## 2023-04-12 DIAGNOSIS — C56.1 MALIGNANT NEOPLASM OF RIGHT OVARY: ICD-10-CM

## 2023-04-12 DIAGNOSIS — C54.1 ENDOMETRIAL CANCER: ICD-10-CM

## 2023-04-12 PROCEDURE — 25500020 PHARM REV CODE 255: Performed by: INTERNAL MEDICINE

## 2023-04-12 PROCEDURE — 74177 CT ABD & PELVIS W/CONTRAST: CPT | Mod: TC

## 2023-04-12 PROCEDURE — 71260 CT THORAX DX C+: CPT | Mod: TC

## 2023-04-12 RX ADMIN — IOPAMIDOL 100 ML: 755 INJECTION, SOLUTION INTRAVENOUS at 09:04

## 2023-04-12 RX ADMIN — DIATRIZOATE MEGLUMINE AND DIATRIZOATE SODIUM 30 ML: 660; 100 LIQUID ORAL; RECTAL at 09:04

## 2023-04-18 NOTE — PROGRESS NOTES
Subjective:       Patient ID: Ruben Clements is a 38 y.o. female.    Chief Complaint: No chief complaint on file.      Medical oncologist in [place]:  Jonas Velazquez MD  GYN/ONC:  Darryn Pires MD     Diagnosis:  1.  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  pT1a, N0, M0 stage IA endometrial cancer diagnosed 8/18/2021     **An addendum to the pathology report from 9/7/2021 internal 9/17/2021 states that the pathologist and the submitting physician's physician assistant had a discussion, and the possibility that the ovary is a metastatic lesion from the endometrium was discussed with a metastasis to the ovary being favored by the clinician.  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  I do not feel that this would change my management, will proceed with 6 cycles of adjuvant carboplatin and paclitaxel.     Current Treatment:   Observation    Treatment History:  1.  Adjuvant carboplatin and paclitaxel given every 3 weeks for 6 cycles, started on 10/27/2021.  Completed on 2/16/2022.      HPI:  Patient who was having irregular menses, pelvic pain, abdominal pain along with nausea and vomiting.  She presented to see her OB/GYN, imaging was done that showed ovarian cyst.  She underwent a right oophorectomy on 6/8/2021, pathology revealed a 15 cm well-differentiated endometrioid adenocarcinoma involving the right ovary, right fallopian tube was uninvolved.  She was then referred to Dr. Darryn Pires with GYN oncology in [place], [place].  On 7/16/2021, she underwent Terra hereditary cancer test, this was negative for pathogenic mutations.  Her [facility] breast cancer risk assessment was 13.9% (general population was 13.2%, no significant increased risk compared to general population).  She  underwent an endometrial biopsy on 8/18/2021 that revealed FIGO grade 1 endometrioid endometrial adenocarcinoma.  She then underwent a robotic hysterectomy, left oophorectomy, bilateral pelvic sentinel node dissection with partial omentectomy and peritoneal biopsies of cul-de-sac nodules.  Pathology revealed endometrioid endometrial adenocarcinoma, FIGO 1 with less than 50% invasion.  Margins were negative, lymph nodes were negative, cul-de-sac nodules were positive for metastatic endometrial carcinoma.  Originally, it was discussed that the cul-de-sac lesions were from her ovarian cancer, this would stage her right ovarian cancer as a pT2b, N0, M0 stage IIB and would stage her endometrial cancer as a pT1a, N0, M0 stage IA.  Per an addendum on 9/17/2021, further discussion suggested a pT3a, N0, M0 stage IIIA endometrial cancer.  Patient was seen by Dr. Velazquez on 9/23/2021, plan was to treat with adjuvant carbo Taxol for 6 cycles.  Due to the fact that the patient lives in [place], [place], it was decided to refer to an oncologist in [place], [place] as this was closer to home.  I initially saw the patient on 10/18/2021 at that visit, she stated to overall be feeling well.  She did have some numbness and tingling, also some depression and anxiety, but had no other major issues to discuss.  Patient started her treatment on 10/27/2021, completed on 2/16/2022.  Surveillance CT scans of the chest, abdomen, and pelvis started on 4/11/2022, these showed no evidence of disease. Most recently done on 04/12/2023 showing no evidence of disease.      Interval History:   Patient presents to clinic for scheduled follow up appointment to review scan results.  She overall is doing well from a cancer standpoint.  She complains of fatigue and thinks it could be related to depression or work.  She denies any vaginal bleeding or any other type of bleeding.  Other than fatigue and depression, she feels well.        Past Medical  History:   Diagnosis Date    CVA (cerebral vascular accident)     Endometrial cancer     Malignant neoplasm of right ovary       Past Surgical History:   Procedure Laterality Date    ADENOIDECTOMY      CHOLECYSTECTOMY      HYSTERECTOMY      SALPINGOOPHORECTOMY      TONSILLECTOMY       Social History     Socioeconomic History    Marital status: Unknown   Tobacco Use    Smoking status: Never    Smokeless tobacco: Never   Substance and Sexual Activity    Alcohol use: Not Currently     Comment: 1-2 times per month    Drug use: Never    Sexual activity: Not Currently     Birth control/protection: Abstinence      Family History   Problem Relation Age of Onset    Diabetes Mother     Hypertension Mother     Depression Mother     Depression Father     Diabetes Father     Early death Father         Heart failure    Stroke Father     COPD Maternal Grandmother         CHF and COPD    Colon cancer Maternal Grandfather     Cancer Maternal Grandfather         Colon and Prostate    Depression Sister     Depression Sister     Diabetes Sister       Review of patient's allergies indicates:  No Known Allergies   Review of Systems   Constitutional:  Negative for appetite change and unexpected weight change.   HENT:  Negative for mouth sores.    Eyes:  Negative for visual disturbance.   Respiratory:  Negative for cough and shortness of breath.    Cardiovascular:  Negative for chest pain.   Gastrointestinal:  Negative for abdominal pain and diarrhea.   Genitourinary:  Negative for frequency.   Musculoskeletal:  Negative for back pain.   Integumentary:  Negative for rash.   Neurological:  Negative for headaches.   Hematological:  Negative for adenopathy.   Psychiatric/Behavioral:  The patient is not nervous/anxious.        Objective:      Physical Exam  Vitals reviewed. Exam conducted with a chaperone present.   Constitutional:       General: She is not in acute distress.     Appearance: Normal appearance.   HENT:      Head: Normocephalic  and atraumatic.      Nose: Nose normal.      Mouth/Throat:      Mouth: Mucous membranes are moist.   Eyes:      Extraocular Movements: Extraocular movements intact.      Conjunctiva/sclera: Conjunctivae normal.   Cardiovascular:      Rate and Rhythm: Normal rate and regular rhythm.      Pulses: Normal pulses.      Heart sounds: Normal heart sounds.   Pulmonary:      Effort: Pulmonary effort is normal.      Breath sounds: Normal breath sounds.   Abdominal:      General: Bowel sounds are normal.      Palpations: Abdomen is soft.   Musculoskeletal:         General: No swelling. Normal range of motion.      Cervical back: Normal range of motion and neck supple.      Right lower leg: No edema.      Left lower leg: No edema.   Lymphadenopathy:      Cervical: No cervical adenopathy.   Skin:     General: Skin is warm and dry.   Neurological:      General: No focal deficit present.      Mental Status: She is alert and oriented to person, place, and time. Mental status is at baseline.   Psychiatric:         Mood and Affect: Mood normal.         Behavior: Behavior normal.       LABS AND IMAGING REVIEWED IN EPIC          Assessment:     1.  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  pT1a, N0, M0 stage IA endometrial cancer diagnosed 8/18/2021     **An addendum to the pathology report from 9/7/2021 internal 9/17/2021 states that the pathologist and the submitting physician's physician assistant had a discussion, and the possibility that the ovary is a metastatic lesion from the endometrium was discussed with a metastasis to the ovary being favored by the clinician.  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  I do not feel that this would change my management, will proceed with 6 cycles of adjuvant carboplatin  and paclitaxel.        Plan:     Patient completed 6 cycles of adjuvant carboplatin and paclitaxel on 02/16/2022.     Move to every 4 month visits with CT scans.  Her current CT scans and tumor markers showed no evidence of disease.    Continue medi-port flushes every 3 months- due today     Labs: CBC, CMP, CA-125     All questions were answered to the best of my ability and she understands the plan moving forward.      Dank Raymundo II, MD I, Reshma Man LPN, acted solely as a scribe for and in the presence of, Dr. Dank Raymundo who performed the service.

## 2023-04-19 ENCOUNTER — INFUSION (OUTPATIENT)
Dept: INFUSION THERAPY | Facility: HOSPITAL | Age: 38
End: 2023-04-19
Attending: FAMILY MEDICINE
Payer: COMMERCIAL

## 2023-04-19 ENCOUNTER — OFFICE VISIT (OUTPATIENT)
Dept: HEMATOLOGY/ONCOLOGY | Facility: CLINIC | Age: 38
End: 2023-04-19
Payer: COMMERCIAL

## 2023-04-19 VITALS
SYSTOLIC BLOOD PRESSURE: 108 MMHG | DIASTOLIC BLOOD PRESSURE: 77 MMHG | OXYGEN SATURATION: 96 % | HEIGHT: 63 IN | HEART RATE: 87 BPM | BODY MASS INDEX: 40.93 KG/M2 | RESPIRATION RATE: 18 BRPM | WEIGHT: 231 LBS | TEMPERATURE: 98 F

## 2023-04-19 DIAGNOSIS — C54.1 ENDOMETRIAL CANCER: ICD-10-CM

## 2023-04-19 DIAGNOSIS — C56.1 MALIGNANT NEOPLASM OF RIGHT OVARY: ICD-10-CM

## 2023-04-19 DIAGNOSIS — C56.1 MALIGNANT NEOPLASM OF RIGHT OVARY: Primary | ICD-10-CM

## 2023-04-19 DIAGNOSIS — C54.1 ENDOMETRIAL CANCER: Primary | ICD-10-CM

## 2023-04-19 PROCEDURE — 99999 PR PBB SHADOW E&M-EST. PATIENT-LVL IV: CPT | Mod: PBBFAC,,, | Performed by: INTERNAL MEDICINE

## 2023-04-19 PROCEDURE — 96523 IRRIG DRUG DELIVERY DEVICE: CPT

## 2023-04-19 PROCEDURE — 99999 PR PBB SHADOW E&M-EST. PATIENT-LVL IV: ICD-10-PCS | Mod: PBBFAC,,, | Performed by: INTERNAL MEDICINE

## 2023-04-19 PROCEDURE — 3078F DIAST BP <80 MM HG: CPT | Mod: CPTII,S$GLB,, | Performed by: INTERNAL MEDICINE

## 2023-04-19 PROCEDURE — 3074F PR MOST RECENT SYSTOLIC BLOOD PRESSURE < 130 MM HG: ICD-10-PCS | Mod: CPTII,S$GLB,, | Performed by: INTERNAL MEDICINE

## 2023-04-19 PROCEDURE — 99214 OFFICE O/P EST MOD 30 MIN: CPT | Mod: S$GLB,,, | Performed by: INTERNAL MEDICINE

## 2023-04-19 PROCEDURE — 1159F PR MEDICATION LIST DOCUMENTED IN MEDICAL RECORD: ICD-10-PCS | Mod: CPTII,S$GLB,, | Performed by: INTERNAL MEDICINE

## 2023-04-19 PROCEDURE — 3008F BODY MASS INDEX DOCD: CPT | Mod: CPTII,S$GLB,, | Performed by: INTERNAL MEDICINE

## 2023-04-19 PROCEDURE — 1160F PR REVIEW ALL MEDS BY PRESCRIBER/CLIN PHARMACIST DOCUMENTED: ICD-10-PCS | Mod: CPTII,S$GLB,, | Performed by: INTERNAL MEDICINE

## 2023-04-19 PROCEDURE — 99214 PR OFFICE/OUTPT VISIT, EST, LEVL IV, 30-39 MIN: ICD-10-PCS | Mod: S$GLB,,, | Performed by: INTERNAL MEDICINE

## 2023-04-19 PROCEDURE — 3078F PR MOST RECENT DIASTOLIC BLOOD PRESSURE < 80 MM HG: ICD-10-PCS | Mod: CPTII,S$GLB,, | Performed by: INTERNAL MEDICINE

## 2023-04-19 PROCEDURE — 3008F PR BODY MASS INDEX (BMI) DOCUMENTED: ICD-10-PCS | Mod: CPTII,S$GLB,, | Performed by: INTERNAL MEDICINE

## 2023-04-19 PROCEDURE — 1159F MED LIST DOCD IN RCRD: CPT | Mod: CPTII,S$GLB,, | Performed by: INTERNAL MEDICINE

## 2023-04-19 PROCEDURE — 3074F SYST BP LT 130 MM HG: CPT | Mod: CPTII,S$GLB,, | Performed by: INTERNAL MEDICINE

## 2023-04-19 PROCEDURE — 1160F RVW MEDS BY RX/DR IN RCRD: CPT | Mod: CPTII,S$GLB,, | Performed by: INTERNAL MEDICINE

## 2023-04-19 PROCEDURE — 63600175 PHARM REV CODE 636 W HCPCS: Performed by: INTERNAL MEDICINE

## 2023-04-19 RX ORDER — SODIUM CHLORIDE 0.9 % (FLUSH) 0.9 %
10 SYRINGE (ML) INJECTION
Status: CANCELLED | OUTPATIENT
Start: 2023-07-01

## 2023-04-19 RX ORDER — SODIUM CHLORIDE 0.9 % (FLUSH) 0.9 %
10 SYRINGE (ML) INJECTION
Status: DISCONTINUED | OUTPATIENT
Start: 2023-04-19 | End: 2023-04-19 | Stop reason: HOSPADM

## 2023-04-19 RX ORDER — HEPARIN 100 UNIT/ML
500 SYRINGE INTRAVENOUS
Status: DISCONTINUED | OUTPATIENT
Start: 2023-04-19 | End: 2023-04-19 | Stop reason: HOSPADM

## 2023-04-19 RX ORDER — HEPARIN 100 UNIT/ML
500 SYRINGE INTRAVENOUS
Status: CANCELLED | OUTPATIENT
Start: 2023-07-01

## 2023-04-19 RX ADMIN — HEPARIN 500 UNITS: 100 SYRINGE at 11:04

## 2023-04-30 ENCOUNTER — PATIENT MESSAGE (OUTPATIENT)
Dept: HEMATOLOGY/ONCOLOGY | Facility: CLINIC | Age: 38
End: 2023-04-30
Payer: COMMERCIAL

## 2023-07-12 ENCOUNTER — INFUSION (OUTPATIENT)
Dept: INFUSION THERAPY | Facility: HOSPITAL | Age: 38
End: 2023-07-12
Attending: FAMILY MEDICINE
Payer: COMMERCIAL

## 2023-07-12 DIAGNOSIS — C54.1 ENDOMETRIAL CANCER: ICD-10-CM

## 2023-07-12 DIAGNOSIS — C56.1 MALIGNANT NEOPLASM OF RIGHT OVARY: Primary | ICD-10-CM

## 2023-07-12 PROCEDURE — 96523 IRRIG DRUG DELIVERY DEVICE: CPT

## 2023-07-12 RX ORDER — SODIUM CHLORIDE 0.9 % (FLUSH) 0.9 %
10 SYRINGE (ML) INJECTION
Status: CANCELLED | OUTPATIENT
Start: 2023-10-01

## 2023-07-12 RX ORDER — SODIUM CHLORIDE 0.9 % (FLUSH) 0.9 %
10 SYRINGE (ML) INJECTION
Status: DISCONTINUED | OUTPATIENT
Start: 2023-07-12 | End: 2023-07-12 | Stop reason: HOSPADM

## 2023-07-12 RX ORDER — HEPARIN 100 UNIT/ML
500 SYRINGE INTRAVENOUS
Status: DISCONTINUED | OUTPATIENT
Start: 2023-07-12 | End: 2023-07-12 | Stop reason: HOSPADM

## 2023-07-12 RX ORDER — HEPARIN 100 UNIT/ML
500 SYRINGE INTRAVENOUS
Status: CANCELLED | OUTPATIENT
Start: 2023-10-01

## 2023-08-14 ENCOUNTER — HOSPITAL ENCOUNTER (OUTPATIENT)
Dept: RADIOLOGY | Facility: HOSPITAL | Age: 38
Discharge: HOME | End: 2023-08-14
Attending: INTERNAL MEDICINE
Payer: COMMERCIAL

## 2023-08-14 ENCOUNTER — PATIENT MESSAGE (OUTPATIENT)
Dept: HEMATOLOGY/ONCOLOGY | Facility: CLINIC | Age: 38
End: 2023-08-14
Payer: COMMERCIAL

## 2023-08-14 DIAGNOSIS — C54.1 ENDOMETRIAL CANCER: ICD-10-CM

## 2023-08-14 DIAGNOSIS — C56.1 MALIGNANT NEOPLASM OF RIGHT OVARY: ICD-10-CM

## 2023-08-14 PROCEDURE — 71260 CT THORAX DX C+: CPT | Mod: TC

## 2023-08-14 PROCEDURE — 25500020 PHARM REV CODE 255: Performed by: INTERNAL MEDICINE

## 2023-08-14 PROCEDURE — 74177 CT ABD & PELVIS W/CONTRAST: CPT | Mod: TC

## 2023-08-14 RX ADMIN — DIATRIZOATE MEGLUMINE AND DIATRIZOATE SODIUM 30 ML: 660; 100 LIQUID ORAL; RECTAL at 08:08

## 2023-08-14 RX ADMIN — IOPAMIDOL 100 ML: 755 INJECTION, SOLUTION INTRAVENOUS at 09:08

## 2023-08-22 ENCOUNTER — OFFICE VISIT (OUTPATIENT)
Dept: HEMATOLOGY/ONCOLOGY | Facility: CLINIC | Age: 38
End: 2023-08-22
Payer: COMMERCIAL

## 2023-08-22 VITALS
TEMPERATURE: 98 F | WEIGHT: 228.38 LBS | DIASTOLIC BLOOD PRESSURE: 63 MMHG | SYSTOLIC BLOOD PRESSURE: 102 MMHG | HEART RATE: 72 BPM | HEIGHT: 63 IN | OXYGEN SATURATION: 100 % | RESPIRATION RATE: 18 BRPM | BODY MASS INDEX: 40.46 KG/M2

## 2023-08-22 DIAGNOSIS — C54.1 ENDOMETRIAL CANCER: ICD-10-CM

## 2023-08-22 DIAGNOSIS — C56.1 MALIGNANT NEOPLASM OF RIGHT OVARY: Primary | ICD-10-CM

## 2023-08-22 PROCEDURE — 99999 PR PBB SHADOW E&M-EST. PATIENT-LVL V: ICD-10-PCS | Mod: PBBFAC,,, | Performed by: INTERNAL MEDICINE

## 2023-08-22 PROCEDURE — 3074F PR MOST RECENT SYSTOLIC BLOOD PRESSURE < 130 MM HG: ICD-10-PCS | Mod: CPTII,S$GLB,, | Performed by: INTERNAL MEDICINE

## 2023-08-22 PROCEDURE — 3078F PR MOST RECENT DIASTOLIC BLOOD PRESSURE < 80 MM HG: ICD-10-PCS | Mod: CPTII,S$GLB,, | Performed by: INTERNAL MEDICINE

## 2023-08-22 PROCEDURE — 3008F PR BODY MASS INDEX (BMI) DOCUMENTED: ICD-10-PCS | Mod: CPTII,S$GLB,, | Performed by: INTERNAL MEDICINE

## 2023-08-22 PROCEDURE — 99214 OFFICE O/P EST MOD 30 MIN: CPT | Mod: S$GLB,,, | Performed by: INTERNAL MEDICINE

## 2023-08-22 PROCEDURE — 3074F SYST BP LT 130 MM HG: CPT | Mod: CPTII,S$GLB,, | Performed by: INTERNAL MEDICINE

## 2023-08-22 PROCEDURE — 1159F MED LIST DOCD IN RCRD: CPT | Mod: CPTII,S$GLB,, | Performed by: INTERNAL MEDICINE

## 2023-08-22 PROCEDURE — 1160F PR REVIEW ALL MEDS BY PRESCRIBER/CLIN PHARMACIST DOCUMENTED: ICD-10-PCS | Mod: CPTII,S$GLB,, | Performed by: INTERNAL MEDICINE

## 2023-08-22 PROCEDURE — 99999 PR PBB SHADOW E&M-EST. PATIENT-LVL V: CPT | Mod: PBBFAC,,, | Performed by: INTERNAL MEDICINE

## 2023-08-22 PROCEDURE — 1159F PR MEDICATION LIST DOCUMENTED IN MEDICAL RECORD: ICD-10-PCS | Mod: CPTII,S$GLB,, | Performed by: INTERNAL MEDICINE

## 2023-08-22 PROCEDURE — 1160F RVW MEDS BY RX/DR IN RCRD: CPT | Mod: CPTII,S$GLB,, | Performed by: INTERNAL MEDICINE

## 2023-08-22 PROCEDURE — 3008F BODY MASS INDEX DOCD: CPT | Mod: CPTII,S$GLB,, | Performed by: INTERNAL MEDICINE

## 2023-08-22 PROCEDURE — 99214 PR OFFICE/OUTPT VISIT, EST, LEVL IV, 30-39 MIN: ICD-10-PCS | Mod: S$GLB,,, | Performed by: INTERNAL MEDICINE

## 2023-08-22 PROCEDURE — 3078F DIAST BP <80 MM HG: CPT | Mod: CPTII,S$GLB,, | Performed by: INTERNAL MEDICINE

## 2023-08-22 RX ORDER — BUPROPION HYDROCHLORIDE 150 MG/1
TABLET ORAL
COMMUNITY
Start: 2023-08-02

## 2023-08-22 NOTE — PROGRESS NOTES
Subjective:       Patient ID: Ruben Clements is a 38 y.o. female.    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  I do not feel that this would change my management, will proceed with 6 cycles of adjuvant carboplatin and paclitaxel.     Current Treatment:   Observation    Treatment History:  Adjuvant carboplatin and paclitaxel given every 3 weeks for 6 cycles, started on 10/27/2021.  Completed on 2/16/2022.      HPI:  Patient who was having irregular menses, pelvic pain, abdominal pain along with nausea and vomiting.  She presented to see her OB/GYN, imaging was done that showed ovarian cyst.  She underwent a right oophorectomy on 6/8/2021, pathology revealed a 15 cm well-differentiated endometrioid adenocarcinoma involving the right ovary, right fallopian tube was uninvolved.  She was then referred to Dr. Darryn Pires with GYN oncology in [place], [place].  On 7/16/2021, she underwent Terra hereditary cancer test, this was negative for pathogenic mutations.  Her [facility] breast cancer risk assessment was 13.9% (general population was 13.2%, no significant increased risk compared  to general population).  She underwent an endometrial biopsy on 8/18/2021 that revealed FIGO grade 1 endometrioid endometrial adenocarcinoma.  She then underwent a robotic hysterectomy, left oophorectomy, bilateral pelvic sentinel node dissection with partial omentectomy and peritoneal biopsies of cul-de-sac nodules.  Pathology revealed endometrioid endometrial adenocarcinoma, FIGO 1 with less than 50% invasion.  Margins were negative, lymph nodes were negative, cul-de-sac nodules were positive for metastatic endometrial carcinoma.  Originally, it was discussed that the cul-de-sac lesions were from her ovarian cancer, this would stage her right ovarian cancer as a pT2b, N0, M0 stage IIB and would stage her endometrial cancer as a pT1a, N0, M0 stage IA.  Per an addendum on 9/17/2021, further discussion suggested a pT3a, N0, M0 stage IIIA endometrial cancer.  Patient was seen by Dr. Velazquez on 9/23/2021, plan was to treat with adjuvant carbo Taxol for 6 cycles.  Due to the fact that the patient lives in [place], [place], it was decided to refer to an oncologist in [place], [place] as this was closer to home.  I initially saw the patient on 10/18/2021 at that visit, she stated to overall be feeling well.  She did have some numbness and tingling, also some depression and anxiety, but had no other major issues to discuss.  Patient started her treatment on 10/27/2021, completed on 2/16/2022.  Surveillance CT scans of the chest, abdomen, and pelvis started on 4/11/2022, these showed no evidence of disease. Most recently done on 08/14/2023 showing interim increase in size of the small nodule at the right suprahilar region, previously measuring 3-4 mm, now measuring 6-7 mm.      Interval History:   Patient presents to clinic for scheduled follow up appointment to review scan results.  She overall is doing well from a cancer standpoint.          Past Medical History:   Diagnosis Date    CVA (cerebral vascular accident)      Endometrial cancer     Malignant neoplasm of right ovary       Past Surgical History:   Procedure Laterality Date    ADENOIDECTOMY      CHOLECYSTECTOMY      HYSTERECTOMY      SALPINGOOPHORECTOMY      TONSILLECTOMY       Social History     Socioeconomic History    Marital status: Single   Tobacco Use    Smoking status: Never    Smokeless tobacco: Never   Substance and Sexual Activity    Alcohol use: Not Currently     Comment: 1-2 times per month    Drug use: Never    Sexual activity: Not Currently     Birth control/protection: Abstinence      Family History   Problem Relation Age of Onset    Diabetes Mother     Hypertension Mother     Depression Mother     Depression Father     Diabetes Father     Early death Father         Heart failure    Stroke Father     COPD Maternal Grandmother         CHF and COPD    Colon cancer Maternal Grandfather     Cancer Maternal Grandfather         Colon and Prostate    Depression Sister     Depression Sister     Diabetes Sister       Review of patient's allergies indicates:  No Known Allergies   Review of Systems   Constitutional:  Negative for appetite change and unexpected weight change.   HENT:  Negative for mouth sores.    Eyes:  Negative for visual disturbance.   Respiratory:  Negative for cough and shortness of breath.    Cardiovascular:  Negative for chest pain.   Gastrointestinal:  Negative for abdominal pain and diarrhea.   Genitourinary:  Negative for frequency.   Musculoskeletal:  Negative for back pain.   Integumentary:  Negative for rash.   Neurological:  Negative for headaches.   Hematological:  Negative for adenopathy.   Psychiatric/Behavioral:  The patient is not nervous/anxious.          Objective:      Physical Exam  Vitals reviewed. Exam conducted with a chaperone present.   Constitutional:       General: She is not in acute distress.     Appearance: Normal appearance.   HENT:      Head: Normocephalic and atraumatic.      Nose: Nose normal.      Mouth/Throat:       Mouth: Mucous membranes are moist.   Eyes:      Extraocular Movements: Extraocular movements intact.      Conjunctiva/sclera: Conjunctivae normal.   Cardiovascular:      Rate and Rhythm: Normal rate and regular rhythm.      Pulses: Normal pulses.      Heart sounds: Normal heart sounds.   Pulmonary:      Effort: Pulmonary effort is normal.      Breath sounds: Normal breath sounds.   Abdominal:      General: Bowel sounds are normal.      Palpations: Abdomen is soft.   Musculoskeletal:         General: No swelling. Normal range of motion.      Cervical back: Normal range of motion and neck supple.      Right lower leg: No edema.      Left lower leg: No edema.   Lymphadenopathy:      Cervical: No cervical adenopathy.   Skin:     General: Skin is warm and dry.   Neurological:      General: No focal deficit present.      Mental Status: She is alert and oriented to person, place, and time. Mental status is at baseline.   Psychiatric:         Mood and Affect: Mood normal.         Behavior: Behavior normal.         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  I do not feel that this would change my management, will proceed with 6 cycles of adjuvant carboplatin and paclitaxel.        Plan:     Patient completed 6 cycles of  adjuvant carboplatin and paclitaxel on 02/16/2022.     CT scan on 08/14/2023 showed interim increase in size of a small nodule in the right suprahilar region, previously measured 3-4 mm now measures 6-7 mm.  Otherwise, no evidence of disease.  Her tumor marker is within normal limits.    I discussed this with Radiology and Interventional Radiology, the lesion is too small for accurate PET/CT evaluation and also too small for biopsy.  We will just repeat a CT scan of the chest, abdomen, and pelvis in 3 months.  This was explained to the patient and she was amenable to this plan.     Labs: CBC, CMP, CA-125     All questions were answered to the best of my ability and she understands the plan moving forward.      Dank Raymundo II, MD I, Reshma Man LPN, acted solely as a scribe for and in the presence of, Dr. Dank Raymundo who performed the service.

## 2023-09-05 ENCOUNTER — PATIENT MESSAGE (OUTPATIENT)
Dept: HEMATOLOGY/ONCOLOGY | Facility: CLINIC | Age: 38
End: 2023-09-05
Payer: COMMERCIAL

## 2023-11-13 ENCOUNTER — PATIENT MESSAGE (OUTPATIENT)
Dept: HEMATOLOGY/ONCOLOGY | Facility: CLINIC | Age: 38
End: 2023-11-13
Payer: COMMERCIAL

## 2023-11-20 ENCOUNTER — PATIENT MESSAGE (OUTPATIENT)
Dept: HEMATOLOGY/ONCOLOGY | Facility: CLINIC | Age: 38
End: 2023-11-20
Payer: COMMERCIAL

## 2023-11-22 ENCOUNTER — HOSPITAL ENCOUNTER (OUTPATIENT)
Dept: RADIOLOGY | Facility: HOSPITAL | Age: 38
Discharge: HOME | End: 2023-11-22
Attending: INTERNAL MEDICINE
Payer: COMMERCIAL

## 2023-11-22 DIAGNOSIS — C56.1 MALIGNANT NEOPLASM OF RIGHT OVARY: ICD-10-CM

## 2023-11-22 DIAGNOSIS — C54.1 ENDOMETRIAL CANCER: ICD-10-CM

## 2023-11-22 PROCEDURE — 25500020 PHARM REV CODE 255: Performed by: INTERNAL MEDICINE

## 2023-11-22 PROCEDURE — 71260 CT THORAX DX C+: CPT | Mod: TC

## 2023-11-22 PROCEDURE — 74177 CT ABD & PELVIS W/CONTRAST: CPT | Mod: TC

## 2023-11-22 RX ADMIN — IOPAMIDOL 100 ML: 755 INJECTION, SOLUTION INTRAVENOUS at 09:11

## 2023-11-24 RX ORDER — SODIUM CHLORIDE 0.9 % (FLUSH) 0.9 %
10 SYRINGE (ML) INJECTION
Status: CANCELLED | OUTPATIENT
Start: 2023-11-24

## 2023-11-24 RX ORDER — HEPARIN 100 UNIT/ML
500 SYRINGE INTRAVENOUS
Status: CANCELLED | OUTPATIENT
Start: 2023-11-24

## 2023-11-28 ENCOUNTER — DOCUMENTATION ONLY (OUTPATIENT)
Dept: HEMATOLOGY/ONCOLOGY | Facility: CLINIC | Age: 38
End: 2023-11-28
Payer: COMMERCIAL

## 2023-11-28 ENCOUNTER — OFFICE VISIT (OUTPATIENT)
Dept: HEMATOLOGY/ONCOLOGY | Facility: CLINIC | Age: 38
End: 2023-11-28
Payer: COMMERCIAL

## 2023-11-28 ENCOUNTER — INFUSION (OUTPATIENT)
Dept: INFUSION THERAPY | Facility: HOSPITAL | Age: 38
End: 2023-11-28
Attending: FAMILY MEDICINE
Payer: COMMERCIAL

## 2023-11-28 VITALS
OXYGEN SATURATION: 98 % | BODY MASS INDEX: 38.73 KG/M2 | TEMPERATURE: 98 F | HEIGHT: 63 IN | WEIGHT: 218.56 LBS | DIASTOLIC BLOOD PRESSURE: 71 MMHG | HEART RATE: 61 BPM | RESPIRATION RATE: 18 BRPM | SYSTOLIC BLOOD PRESSURE: 107 MMHG

## 2023-11-28 DIAGNOSIS — C54.1 ENDOMETRIAL CANCER: ICD-10-CM

## 2023-11-28 DIAGNOSIS — C56.1 MALIGNANT NEOPLASM OF RIGHT OVARY: Primary | ICD-10-CM

## 2023-11-28 DIAGNOSIS — R93.89 ABNORMAL FINDING ON CT SCAN: ICD-10-CM

## 2023-11-28 PROCEDURE — 3078F PR MOST RECENT DIASTOLIC BLOOD PRESSURE < 80 MM HG: ICD-10-PCS | Mod: CPTII,S$GLB,, | Performed by: INTERNAL MEDICINE

## 2023-11-28 PROCEDURE — 1160F PR REVIEW ALL MEDS BY PRESCRIBER/CLIN PHARMACIST DOCUMENTED: ICD-10-PCS | Mod: CPTII,S$GLB,, | Performed by: INTERNAL MEDICINE

## 2023-11-28 PROCEDURE — 63600175 PHARM REV CODE 636 W HCPCS: Performed by: NURSE PRACTITIONER

## 2023-11-28 PROCEDURE — 3008F BODY MASS INDEX DOCD: CPT | Mod: CPTII,S$GLB,, | Performed by: INTERNAL MEDICINE

## 2023-11-28 PROCEDURE — 99999 PR PBB SHADOW E&M-EST. PATIENT-LVL IV: CPT | Mod: PBBFAC,,, | Performed by: INTERNAL MEDICINE

## 2023-11-28 PROCEDURE — 1160F RVW MEDS BY RX/DR IN RCRD: CPT | Mod: CPTII,S$GLB,, | Performed by: INTERNAL MEDICINE

## 2023-11-28 PROCEDURE — 1159F PR MEDICATION LIST DOCUMENTED IN MEDICAL RECORD: ICD-10-PCS | Mod: CPTII,S$GLB,, | Performed by: INTERNAL MEDICINE

## 2023-11-28 PROCEDURE — 99214 PR OFFICE/OUTPT VISIT, EST, LEVL IV, 30-39 MIN: ICD-10-PCS | Mod: S$GLB,,, | Performed by: INTERNAL MEDICINE

## 2023-11-28 PROCEDURE — 1159F MED LIST DOCD IN RCRD: CPT | Mod: CPTII,S$GLB,, | Performed by: INTERNAL MEDICINE

## 2023-11-28 PROCEDURE — 99999 PR PBB SHADOW E&M-EST. PATIENT-LVL IV: ICD-10-PCS | Mod: PBBFAC,,, | Performed by: INTERNAL MEDICINE

## 2023-11-28 PROCEDURE — 3074F PR MOST RECENT SYSTOLIC BLOOD PRESSURE < 130 MM HG: ICD-10-PCS | Mod: CPTII,S$GLB,, | Performed by: INTERNAL MEDICINE

## 2023-11-28 PROCEDURE — 3074F SYST BP LT 130 MM HG: CPT | Mod: CPTII,S$GLB,, | Performed by: INTERNAL MEDICINE

## 2023-11-28 PROCEDURE — 3008F PR BODY MASS INDEX (BMI) DOCUMENTED: ICD-10-PCS | Mod: CPTII,S$GLB,, | Performed by: INTERNAL MEDICINE

## 2023-11-28 PROCEDURE — 99214 OFFICE O/P EST MOD 30 MIN: CPT | Mod: S$GLB,,, | Performed by: INTERNAL MEDICINE

## 2023-11-28 PROCEDURE — 3078F DIAST BP <80 MM HG: CPT | Mod: CPTII,S$GLB,, | Performed by: INTERNAL MEDICINE

## 2023-11-28 PROCEDURE — 96523 IRRIG DRUG DELIVERY DEVICE: CPT

## 2023-11-28 RX ORDER — HEPARIN 100 UNIT/ML
500 SYRINGE INTRAVENOUS
OUTPATIENT
Start: 2024-01-01

## 2023-11-28 RX ORDER — HEPARIN 100 UNIT/ML
500 SYRINGE INTRAVENOUS
Status: DISCONTINUED | OUTPATIENT
Start: 2023-11-28 | End: 2023-11-28 | Stop reason: HOSPADM

## 2023-11-28 RX ORDER — SODIUM CHLORIDE 0.9 % (FLUSH) 0.9 %
10 SYRINGE (ML) INJECTION
Status: DISCONTINUED | OUTPATIENT
Start: 2023-11-28 | End: 2023-11-28 | Stop reason: HOSPADM

## 2023-11-28 RX ORDER — SODIUM CHLORIDE 0.9 % (FLUSH) 0.9 %
10 SYRINGE (ML) INJECTION
OUTPATIENT
Start: 2024-01-01

## 2023-11-28 RX ORDER — ALBUTEROL SULFATE 90 UG/1
AEROSOL, METERED RESPIRATORY (INHALATION)
COMMUNITY
Start: 2023-11-20 | End: 2024-01-31

## 2023-11-28 RX ADMIN — HEPARIN 500 UNITS: 100 SYRINGE at 12:11

## 2023-11-28 NOTE — PROGRESS NOTES
Subjective:       Patient ID: Ruben Clements is a 38 y.o. female.    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  I do not feel that this would change my management, will proceed with 6 cycles of adjuvant carboplatin and paclitaxel.     Current Treatment:   Observation    Treatment History:  Adjuvant carboplatin and paclitaxel given every 3 weeks for 6 cycles, started on 10/27/2021.  Completed on 2/16/2022.      HPI:  Patient who was having irregular menses, pelvic pain, abdominal pain along with nausea and vomiting.  She presented to see her OB/GYN, imaging was done that showed ovarian cyst.  She underwent a right oophorectomy on 6/8/2021, pathology revealed a 15 cm well-differentiated endometrioid adenocarcinoma involving the right ovary, right fallopian tube was uninvolved.  She was then referred to Dr. Darryn Pires with GYN oncology in [place], [place].  On 7/16/2021, she underwent Terra hereditary cancer test, this was negative for pathogenic mutations.  Her [facility] breast cancer risk assessment was 13.9% (general population was 13.2%, no significant increased risk compared to general population).  She underwent an  endometrial biopsy on 8/18/2021 that revealed FIGO grade 1 endometrioid endometrial adenocarcinoma.  She then underwent a robotic hysterectomy, left oophorectomy, bilateral pelvic sentinel node dissection with partial omentectomy and peritoneal biopsies of cul-de-sac nodules.  Pathology revealed endometrioid endometrial adenocarcinoma, FIGO 1 with less than 50% invasion.  Margins were negative, lymph nodes were negative, cul-de-sac nodules were positive for metastatic endometrial carcinoma.  Originally, it was discussed that the cul-de-sac lesions were from her ovarian cancer, this would stage her right ovarian cancer as a pT2b, N0, M0 stage IIB and would stage her endometrial cancer as a pT1a, N0, M0 stage IA.  Per an addendum on 9/17/2021, further discussion suggested a pT3a, N0, M0 stage IIIA endometrial cancer.  Patient was seen by Dr. Velazquez on 9/23/2021, plan was to treat with adjuvant carbo Taxol for 6 cycles.  Due to the fact that the patient lives in [place], [place], it was decided to refer to an oncologist in [place], [place] as this was closer to home.  I initially saw the patient on 10/18/2021 at that visit, she stated to overall be feeling well.  She did have some numbness and tingling, also some depression and anxiety, but had no other major issues to discuss.  Patient started her treatment on 10/27/2021, completed on 2/16/2022.  Surveillance CT scans of the chest, abdomen, and pelvis started on 4/11/2022, these showed no evidence of disease. Most recently done on 08/14/2023 showing interim increase in size of the small nodule at the right suprahilar region, previously measuring 3-4 mm, now measuring 6-7 mm. CT scan of the chest, abdomen, and pelvis done on 11/22/2023 Enlarging right apical lung nodule now measuring 1.1 x 1.0 cm.  There was a new 1.0 cm soft tissue nodule in the fat and musculature of the left lateral lower abdominal wall.    Interval History:   Patient presents to clinic for  scheduled follow up appointment to review scan results.  She she has had some depression recently.  She has spoken with Ms. Ruben Ma, our /counselor.  Otherwise, she has no major symptoms.  She is nervous about the results of her CT scan.        Past Medical History:   Diagnosis Date    CVA (cerebral vascular accident)     Endometrial cancer     Malignant neoplasm of right ovary       Past Surgical History:   Procedure Laterality Date    ADENOIDECTOMY      CHOLECYSTECTOMY      HYSTERECTOMY      SALPINGOOPHORECTOMY      TONSILLECTOMY       Social History     Socioeconomic History    Marital status: Single   Tobacco Use    Smoking status: Never    Smokeless tobacco: Never   Substance and Sexual Activity    Alcohol use: Not Currently     Comment: 1-2 times per month    Drug use: Never    Sexual activity: Not Currently     Birth control/protection: Abstinence      Family History   Problem Relation Age of Onset    Diabetes Mother     Hypertension Mother     Depression Mother     Depression Father     Diabetes Father     Early death Father         Heart failure    Stroke Father     COPD Maternal Grandmother         CHF and COPD    Colon cancer Maternal Grandfather     Cancer Maternal Grandfather         Colon and Prostate    Depression Sister     Depression Sister     Diabetes Sister       Review of patient's allergies indicates:  No Known Allergies   Review of Systems   Constitutional:  Negative for appetite change and unexpected weight change.   HENT:  Negative for mouth sores.    Eyes:  Negative for visual disturbance.   Respiratory:  Negative for cough and shortness of breath.    Cardiovascular:  Negative for chest pain.   Gastrointestinal:  Negative for abdominal pain and diarrhea.   Genitourinary:  Negative for frequency.   Musculoskeletal:  Negative for back pain.   Integumentary:  Negative for rash.   Neurological:  Negative for headaches.   Hematological:  Negative for adenopathy.    Psychiatric/Behavioral:  The patient is not nervous/anxious.          Objective:      Physical Exam  Vitals reviewed. Exam conducted with a chaperone present.   Constitutional:       General: She is not in acute distress.     Appearance: Normal appearance.   HENT:      Head: Normocephalic and atraumatic.      Nose: Nose normal.      Mouth/Throat:      Mouth: Mucous membranes are moist.   Eyes:      Extraocular Movements: Extraocular movements intact.      Conjunctiva/sclera: Conjunctivae normal.   Cardiovascular:      Rate and Rhythm: Normal rate and regular rhythm.      Pulses: Normal pulses.      Heart sounds: Normal heart sounds.   Pulmonary:      Effort: Pulmonary effort is normal.      Breath sounds: Normal breath sounds.   Abdominal:      General: Bowel sounds are normal.      Palpations: Abdomen is soft.   Musculoskeletal:         General: No swelling. Normal range of motion.      Cervical back: Normal range of motion and neck supple.      Right lower leg: No edema.      Left lower leg: No edema.   Lymphadenopathy:      Cervical: No cervical adenopathy.   Skin:     General: Skin is warm and dry.   Neurological:      General: No focal deficit present.      Mental Status: She is alert and oriented to person, place, and time. Mental status is at baseline.   Psychiatric:         Mood and Affect: Mood normal.         Behavior: Behavior normal.         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/2021 with surgery (cul-de-sac nodules found during completion hysterectomy and salpingo-oophorectomy)  pT1a, N0, M0 stage IA endometrial cancer diagnosed 8/18/2021      **An addendum to the pathology report from 9/7/2021 internal 9/17/2021 states that the pathologist and the submitting physician's physician assistant had a discussion, and the possibility that the ovary is a metastatic lesion from the endometrium was  discussed with a metastasis to the ovary being favored by the clinician.  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  I do not feel that this would change my management, will proceed with 6 cycles of adjuvant carboplatin and paclitaxel.        Plan:     Patient completed 6 cycles of adjuvant carboplatin and paclitaxel on 02/16/2022.     CT scan on 11/22/2023 showed increasing size of the right apical nodule, now measuring 1.0 x 1.1 cm.  There was also a new 1.0 cm nodule in the left abdominal wall.    PET/CT scan ordered    Return to clinic in 2-3 weeks     All questions were answered to the best of my ability and she understands the plan moving forward.      Dank Raymundo II, MD I, Reshma Man LPN, acted solely as a scribe for and in the presence of, Dr. Dank Raymundo who performed the service.

## 2023-11-28 NOTE — NURSING
I met with Ruben as requested by the care team as a result of the distress screening results. Ruben is in agreement to a follow up therapy appointment on 12/04/2023. We will discuss goals and future appointments.

## 2023-11-29 ENCOUNTER — PATIENT MESSAGE (OUTPATIENT)
Dept: HEMATOLOGY/ONCOLOGY | Facility: CLINIC | Age: 38
End: 2023-11-29
Payer: COMMERCIAL

## 2023-12-01 ENCOUNTER — PATIENT MESSAGE (OUTPATIENT)
Dept: HEMATOLOGY/ONCOLOGY | Facility: CLINIC | Age: 38
End: 2023-12-01
Payer: COMMERCIAL

## 2023-12-04 ENCOUNTER — DOCUMENTATION ONLY (OUTPATIENT)
Dept: HEMATOLOGY/ONCOLOGY | Facility: CLINIC | Age: 38
End: 2023-12-04
Payer: MEDICARE

## 2023-12-04 NOTE — NURSING
I contacted Ruben for a follow up from our previous interaction. She canceled a face to face appointment scheduled for therapy. I called her and we spoke about a safety plan. She voiced understanding. She also stated she wasn't consistent with her Prozac and she now is committed to taking it regularly. She mentioned several family members and friends who are part of her support. She will contact me if any needs arise. She declined a follow up phone call.

## 2023-12-06 ENCOUNTER — PATIENT MESSAGE (OUTPATIENT)
Dept: HEMATOLOGY/ONCOLOGY | Facility: CLINIC | Age: 38
End: 2023-12-06
Payer: COMMERCIAL

## 2023-12-06 ENCOUNTER — HOSPITAL ENCOUNTER (OUTPATIENT)
Dept: RADIOLOGY | Facility: HOSPITAL | Age: 38
Discharge: HOME | End: 2023-12-06
Attending: INTERNAL MEDICINE
Payer: COMMERCIAL

## 2023-12-06 DIAGNOSIS — C56.1 MALIGNANT NEOPLASM OF RIGHT OVARY: ICD-10-CM

## 2023-12-06 DIAGNOSIS — R93.89 ABNORMAL FINDING ON CT SCAN: ICD-10-CM

## 2023-12-06 PROCEDURE — A9552 F18 FDG: HCPCS

## 2023-12-08 NOTE — PROGRESS NOTES
Subjective:       Patient ID: Ruben Clements is a 38 y.o. female.    Chief Complaint: No chief complaint on file.      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  I do not feel that this would change my management, will proceed with 6 cycles of adjuvant carboplatin and paclitaxel.     Current Treatment:   Observation    Treatment History:  Adjuvant carboplatin and paclitaxel given every 3 weeks for 6 cycles, started on 10/27/2021.  Completed on 2/16/2022.      HPI:  Patient who was having irregular menses, pelvic pain, abdominal pain along with nausea and vomiting.  She presented to see her OB/GYN, imaging was done that showed ovarian cyst.  She underwent a right oophorectomy on 6/8/2021, pathology revealed a 15 cm well-differentiated endometrioid adenocarcinoma involving the right ovary, right fallopian tube was uninvolved.  She was then referred to Dr. Darryn Pires with GYN oncology in [place], [place].  On 7/16/2021, she underwent Terra hereditary cancer test, this was negative for pathogenic mutations.  Her [facility] breast cancer risk assessment was 13.9% (general population was 13.2%, no significant increased risk compared to general population).  She underwent an  endometrial biopsy on 8/18/2021 that revealed FIGO grade 1 endometrioid endometrial adenocarcinoma.  She then underwent a robotic hysterectomy, left oophorectomy, bilateral pelvic sentinel node dissection with partial omentectomy and peritoneal biopsies of cul-de-sac nodules.  Pathology revealed endometrioid endometrial adenocarcinoma, FIGO 1 with less than 50% invasion.  Margins were negative, lymph nodes were negative, cul-de-sac nodules were positive for metastatic endometrial carcinoma.  Originally, it was discussed that the cul-de-sac lesions were from her ovarian cancer, this would stage her right ovarian cancer as a pT2b, N0, M0 stage IIB and would stage her endometrial cancer as a pT1a, N0, M0 stage IA.  Per an addendum on 9/17/2021, further discussion suggested a pT3a, N0, M0 stage IIIA endometrial cancer.  Patient was seen by Dr. Velazquez on 9/23/2021, plan was to treat with adjuvant carbo Taxol for 6 cycles.  Due to the fact that the patient lives in [place], [place], it was decided to refer to an oncologist in [place], [place] as this was closer to home.  I initially saw the patient on 10/18/2021 at that visit, she stated to overall be feeling well.  She did have some numbness and tingling, also some depression and anxiety, but had no other major issues to discuss.  Patient started her treatment on 10/27/2021, completed on 2/16/2022.  Surveillance CT scans of the chest, abdomen, and pelvis started on 4/11/2022, these showed no evidence of disease. Most recently done on 08/14/2023 showing interim increase in size of the small nodule at the right suprahilar region, previously measuring 3-4 mm, now measuring 6-7 mm. CT scan of the chest, abdomen, and pelvis done on 11/22/2023 Enlarging right apical lung nodule now measuring 1.1 x 1.0 cm.  There was a new 1.0 cm soft tissue nodule in the fat and musculature of the left lateral lower abdominal wall.    PET/CT scan done on 12/06/2023 showed hypermetabolic  1.0 cm solid right upper lobe nodule with 2 hypermetabolic lymph nodes, 1 in the right axilla and 1 in the portacaval system.  There was a 1.1 cm nodule in the left lower anterior abdominal wall that was also hypermetabolic.    Interval History:   Patient presents to clinic for scheduled follow up appointment to review scan results.  Overall, she is doing okay.  She has no major issues to discuss.  She does have some left lower abdominal pain, likely from the nodule seen on PET/CT.        Past Medical History:   Diagnosis Date    CVA (cerebral vascular accident)     Endometrial cancer     Malignant neoplasm of right ovary       Past Surgical History:   Procedure Laterality Date    ADENOIDECTOMY      CHOLECYSTECTOMY      HYSTERECTOMY      SALPINGOOPHORECTOMY      TONSILLECTOMY       Social History     Socioeconomic History    Marital status: Single   Tobacco Use    Smoking status: Never    Smokeless tobacco: Never   Substance and Sexual Activity    Alcohol use: Not Currently     Comment: 1-2 times per month    Drug use: Never    Sexual activity: Not Currently     Birth control/protection: Abstinence      Family History   Problem Relation Age of Onset    Diabetes Mother     Hypertension Mother     Depression Mother     Depression Father     Diabetes Father     Early death Father         Heart failure    Stroke Father     COPD Maternal Grandmother         CHF and COPD    Colon cancer Maternal Grandfather     Cancer Maternal Grandfather         Colon and Prostate    Depression Sister     Depression Sister     Diabetes Sister       Review of patient's allergies indicates:  No Known Allergies   Review of Systems   Constitutional:  Negative for appetite change and unexpected weight change.   HENT:  Negative for mouth sores.    Eyes:  Negative for visual disturbance.   Respiratory:  Negative for cough and shortness of breath.    Cardiovascular:  Negative for chest pain.   Gastrointestinal:  Negative for abdominal pain and  diarrhea.   Genitourinary:  Negative for frequency.   Musculoskeletal:  Negative for back pain.   Integumentary:  Negative for rash.   Neurological:  Negative for headaches.   Hematological:  Negative for adenopathy.   Psychiatric/Behavioral:  The patient is not nervous/anxious.          Objective:      Physical Exam  Vitals reviewed. Exam conducted with a chaperone present.   Constitutional:       General: She is not in acute distress.     Appearance: Normal appearance.   HENT:      Head: Normocephalic and atraumatic.      Nose: Nose normal.      Mouth/Throat:      Mouth: Mucous membranes are moist.   Eyes:      Extraocular Movements: Extraocular movements intact.      Conjunctiva/sclera: Conjunctivae normal.   Cardiovascular:      Rate and Rhythm: Normal rate and regular rhythm.      Pulses: Normal pulses.      Heart sounds: Normal heart sounds.   Pulmonary:      Effort: Pulmonary effort is normal.      Breath sounds: Normal breath sounds.   Abdominal:      General: Bowel sounds are normal.      Palpations: Abdomen is soft.   Musculoskeletal:         General: No swelling. Normal range of motion.      Cervical back: Normal range of motion and neck supple.      Right lower leg: No edema.      Left lower leg: No edema.   Lymphadenopathy:      Cervical: No cervical adenopathy.   Skin:     General: Skin is warm and dry.   Neurological:      General: No focal deficit present.      Mental Status: She is alert and oriented to person, place, and time. Mental status is at baseline.   Psychiatric:         Mood and Affect: Mood normal.         Behavior: Behavior normal.         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/2021 with surgery (cul-de-sac nodules found during completion hysterectomy and salpingo-oophorectomy)  pT1a, N0, M0 stage IA endometrial cancer diagnosed 8/18/2021      **An addendum to the pathology report  from 9/7/2021 internal 9/17/2021 states that the pathologist and the submitting physician's physician assistant had a discussion, and the possibility that the ovary is a metastatic lesion from the endometrium was discussed with a metastasis to the ovary being favored by the clinician.  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  I do not feel that this would change my management, will proceed with 6 cycles of adjuvant carboplatin and paclitaxel.        Plan:     Patient completed 6 cycles of adjuvant carboplatin and paclitaxel on 02/16/2022.     CT scan on 11/22/2023 showed increasing size of the right apical nodule, now measuring 1.0 x 1.1 cm.  There was also a new 1.0 cm nodule in the left abdominal wall.    PET/CT scan done on 12/06/2023 shows hypermetabolic right lung nodule, left abdominal wall nodule, right axillary lymph node and portacaval lymph node.    We will have the patient have a biopsy of the right axillary lymph node.    I will refer patient to radiation oncology, Dr. Ramses Ruiz    Return to clinic in 3-4 weeks     All questions were answered to the best of my ability and she understands the plan moving forward.      Dank Raymundo II, MD      I, Reshma Man LPN, acted solely as a scribe for and in the presence of, Dr. Dank Raymundo who performed the service.

## 2023-12-11 ENCOUNTER — HOSPITAL ENCOUNTER (OUTPATIENT)
Dept: RADIOLOGY | Facility: HOSPITAL | Age: 38
Discharge: HOME | End: 2023-12-11
Attending: INTERNAL MEDICINE
Payer: COMMERCIAL

## 2023-12-11 ENCOUNTER — OFFICE VISIT (OUTPATIENT)
Dept: HEMATOLOGY/ONCOLOGY | Facility: CLINIC | Age: 38
End: 2023-12-11
Payer: COMMERCIAL

## 2023-12-11 VITALS
HEART RATE: 56 BPM | WEIGHT: 218.13 LBS | BODY MASS INDEX: 38.65 KG/M2 | RESPIRATION RATE: 18 BRPM | OXYGEN SATURATION: 100 % | DIASTOLIC BLOOD PRESSURE: 75 MMHG | HEIGHT: 63 IN | SYSTOLIC BLOOD PRESSURE: 108 MMHG

## 2023-12-11 DIAGNOSIS — C54.1 ENDOMETRIAL CANCER: ICD-10-CM

## 2023-12-11 DIAGNOSIS — R91.1 LUNG NODULE: ICD-10-CM

## 2023-12-11 DIAGNOSIS — C56.1 MALIGNANT NEOPLASM OF RIGHT OVARY: Primary | ICD-10-CM

## 2023-12-11 DIAGNOSIS — C56.1 MALIGNANT NEOPLASM OF RIGHT OVARY: ICD-10-CM

## 2023-12-11 DIAGNOSIS — R92.8 ABNORMAL MAMMOGRAM: ICD-10-CM

## 2023-12-11 DIAGNOSIS — R92.8 ABNORMAL MAMMOGRAM: Primary | ICD-10-CM

## 2023-12-11 PROCEDURE — 77066 DX MAMMO INCL CAD BI: CPT | Mod: TC

## 2023-12-11 PROCEDURE — 77062 BREAST TOMOSYNTHESIS BI: CPT | Mod: 26,,, | Performed by: RADIOLOGY

## 2023-12-11 PROCEDURE — 77062 MAMMO DIGITAL DIAGNOSTIC BILAT WITH TOMO: ICD-10-PCS | Mod: 26,,, | Performed by: RADIOLOGY

## 2023-12-11 PROCEDURE — 1160F RVW MEDS BY RX/DR IN RCRD: CPT | Mod: CPTII,S$GLB,, | Performed by: INTERNAL MEDICINE

## 2023-12-11 PROCEDURE — 77066 DX MAMMO INCL CAD BI: CPT | Mod: 26,76,, | Performed by: RADIOLOGY

## 2023-12-11 PROCEDURE — 77066 MAMMO DIGITAL DIAGNOSTIC BILAT WITH TOMO: ICD-10-PCS | Mod: 26,76,, | Performed by: RADIOLOGY

## 2023-12-11 PROCEDURE — 76642 ULTRASOUND BREAST LIMITED: CPT | Mod: TC,50

## 2023-12-11 PROCEDURE — 77066 MAMMO DIGITAL DIAGNOSTIC BILAT WITH TOMO: ICD-10-PCS | Mod: 26,,, | Performed by: RADIOLOGY

## 2023-12-11 PROCEDURE — 3008F PR BODY MASS INDEX (BMI) DOCUMENTED: ICD-10-PCS | Mod: CPTII,S$GLB,, | Performed by: INTERNAL MEDICINE

## 2023-12-11 PROCEDURE — 3074F SYST BP LT 130 MM HG: CPT | Mod: CPTII,S$GLB,, | Performed by: INTERNAL MEDICINE

## 2023-12-11 PROCEDURE — 77062 MAMMO DIGITAL DIAGNOSTIC BILAT WITH TOMO: ICD-10-PCS | Mod: 26,76,, | Performed by: RADIOLOGY

## 2023-12-11 PROCEDURE — 38505 NEEDLE BIOPSY LYMPH NODES: CPT

## 2023-12-11 PROCEDURE — 99214 PR OFFICE/OUTPT VISIT, EST, LEVL IV, 30-39 MIN: ICD-10-PCS | Mod: S$GLB,,, | Performed by: INTERNAL MEDICINE

## 2023-12-11 PROCEDURE — 38505 NEEDLE BIOPSY LYMPH NODES: CPT | Mod: RT,,, | Performed by: RADIOLOGY

## 2023-12-11 PROCEDURE — 19084 BX BREAST ADD LESION US IMAG: CPT

## 2023-12-11 PROCEDURE — 1160F PR REVIEW ALL MEDS BY PRESCRIBER/CLIN PHARMACIST DOCUMENTED: ICD-10-PCS | Mod: CPTII,S$GLB,, | Performed by: INTERNAL MEDICINE

## 2023-12-11 PROCEDURE — 27000550 US BREAST BIOPSY WITH IMAGING 1ST SITE LEFT

## 2023-12-11 PROCEDURE — 3008F BODY MASS INDEX DOCD: CPT | Mod: CPTII,S$GLB,, | Performed by: INTERNAL MEDICINE

## 2023-12-11 PROCEDURE — 99999 PR PBB SHADOW E&M-EST. PATIENT-LVL V: CPT | Mod: PBBFAC,,, | Performed by: INTERNAL MEDICINE

## 2023-12-11 PROCEDURE — 99214 OFFICE O/P EST MOD 30 MIN: CPT | Mod: S$GLB,,, | Performed by: INTERNAL MEDICINE

## 2023-12-11 PROCEDURE — 77062 BREAST TOMOSYNTHESIS BI: CPT | Mod: TC

## 2023-12-11 PROCEDURE — 1159F PR MEDICATION LIST DOCUMENTED IN MEDICAL RECORD: ICD-10-PCS | Mod: CPTII,S$GLB,, | Performed by: INTERNAL MEDICINE

## 2023-12-11 PROCEDURE — 3074F PR MOST RECENT SYSTOLIC BLOOD PRESSURE < 130 MM HG: ICD-10-PCS | Mod: CPTII,S$GLB,, | Performed by: INTERNAL MEDICINE

## 2023-12-11 PROCEDURE — 77062 BREAST TOMOSYNTHESIS BI: CPT | Mod: 26,76,, | Performed by: RADIOLOGY

## 2023-12-11 PROCEDURE — 77066 DX MAMMO INCL CAD BI: CPT | Mod: 26,,, | Performed by: RADIOLOGY

## 2023-12-11 PROCEDURE — 76642 PR US BREAST UNILAT LIMITED: ICD-10-PCS | Mod: 26,LT,, | Performed by: RADIOLOGY

## 2023-12-11 PROCEDURE — 76642 ULTRASOUND BREAST LIMITED: CPT | Mod: 26,LT,, | Performed by: RADIOLOGY

## 2023-12-11 PROCEDURE — 3078F DIAST BP <80 MM HG: CPT | Mod: CPTII,S$GLB,, | Performed by: INTERNAL MEDICINE

## 2023-12-11 PROCEDURE — 99999 PR PBB SHADOW E&M-EST. PATIENT-LVL V: ICD-10-PCS | Mod: PBBFAC,,, | Performed by: INTERNAL MEDICINE

## 2023-12-11 PROCEDURE — 19083 US BREAST BIOPSY WITH IMAGING 1ST SITE LEFT: ICD-10-PCS | Mod: LT,,, | Performed by: RADIOLOGY

## 2023-12-11 PROCEDURE — 19084 BX BREAST ADD LESION US IMAG: CPT | Mod: LT,,, | Performed by: RADIOLOGY

## 2023-12-11 PROCEDURE — 76642 ULTRASOUND BREAST LIMITED: CPT | Mod: 26,RT,, | Performed by: RADIOLOGY

## 2023-12-11 PROCEDURE — 19084 PR BX BRST, EA ADD'L LESION, US GUIDANCE: ICD-10-PCS | Mod: LT,,, | Performed by: RADIOLOGY

## 2023-12-11 PROCEDURE — 1159F MED LIST DOCD IN RCRD: CPT | Mod: CPTII,S$GLB,, | Performed by: INTERNAL MEDICINE

## 2023-12-11 PROCEDURE — 19083 BX BREAST 1ST LESION US IMAG: CPT | Mod: LT,,, | Performed by: RADIOLOGY

## 2023-12-11 PROCEDURE — 3078F PR MOST RECENT DIASTOLIC BLOOD PRESSURE < 80 MM HG: ICD-10-PCS | Mod: CPTII,S$GLB,, | Performed by: INTERNAL MEDICINE

## 2023-12-11 PROCEDURE — 38505 US BIOPSY LYMPH NODE AXILLA: ICD-10-PCS | Mod: RT,,, | Performed by: RADIOLOGY

## 2023-12-15 LAB — PSYCHE PATHOLOGY RESULT: NORMAL

## 2023-12-18 NOTE — PROGRESS NOTES
Biopsy path results reviewed by Dr. Ochoa--benign and concordant. Right axilla lymph node probably concordant--will defer to Dr. Raymundo regarding reactive lymphoid hyperplasia. Results and recommendations called to patient.  Patient has follow up appointment scheduled with Dr. Raymundo on 1/2/24 regarding breast biopsy results. Patient verbalized understanding.

## 2023-12-20 ENCOUNTER — TELEPHONE (OUTPATIENT)
Dept: HEMATOLOGY/ONCOLOGY | Facility: CLINIC | Age: 38
End: 2023-12-20
Payer: COMMERCIAL

## 2023-12-20 NOTE — TELEPHONE ENCOUNTER
Patient states that Reanna, breast navigator, notified her that the recent bx was considered benign. She has an appointment with Dr. Ruiz 12/27/23 and f/u with you 1/02/24. She would like to know if this will change her treatment plan. Please advise.

## 2024-01-10 ENCOUNTER — PATIENT MESSAGE (OUTPATIENT)
Dept: HEMATOLOGY/ONCOLOGY | Facility: CLINIC | Age: 39
End: 2024-01-10
Payer: COMMERCIAL

## 2024-01-11 ENCOUNTER — PATIENT MESSAGE (OUTPATIENT)
Dept: HEMATOLOGY/ONCOLOGY | Facility: CLINIC | Age: 39
End: 2024-01-11
Payer: COMMERCIAL

## 2024-01-25 ENCOUNTER — PATIENT MESSAGE (OUTPATIENT)
Dept: HEMATOLOGY/ONCOLOGY | Facility: CLINIC | Age: 39
End: 2024-01-25
Payer: COMMERCIAL

## 2024-01-26 NOTE — PROGRESS NOTES
Subjective:       Patient ID: Ruben Clements is a 39 y.o. female.    Chief Complaint: Follow-up (Patient has no concerns today)      Medical oncologist in [place]:  Jonas Velazquez MD  GYN/ONC:  Darryn Pires MD     Diagnosis:  pT2b, N0, M0 stage IIB (due to cul-de-sac nodules) right ovarian cancer diagnosed 6/8/2021 with surgery (cul-de-sac nodules found during completion hysterectomy and salpingo-oophorectomy)  pT1a, N0, M0 stage IA endometrial cancer diagnosed 8/18/2021  Recurrence in the left abdominal sidewall diagnosed 01/25/2024 via biopsy at [facility].     **An addendum to the pathology report from 9/7/2021 internal 9/17/2021 states that the pathologist and the submitting physician's physician assistant had a discussion, and the possibility that the ovary is a metastatic lesion from the endometrium was discussed with a metastasis to the ovary being favored by the clinician.  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  I do not feel that this would change my management, will proceed with 6 cycles of adjuvant carboplatin and paclitaxel.     Current Treatment:   Observation    Treatment History:  Adjuvant carboplatin and paclitaxel given every 3 weeks for 6 cycles, started on 10/27/2021.  Completed on 2/16/2022.      HPI:  Patient who was having irregular menses, pelvic pain, abdominal pain along with nausea and vomiting.  She presented to see her OB/GYN, imaging was done that showed ovarian cyst.  She underwent a right oophorectomy on 6/8/2021, pathology revealed a 15 cm well-differentiated endometrioid adenocarcinoma involving the right ovary, right fallopian tube was uninvolved.  She was then referred to Dr. Darryn Pires with GYN oncology in [place], [place].  On 7/16/2021, she underwent Terra hereditary cancer test, this was negative for pathogenic mutations.  Her [facility] breast cancer risk assessment was 13.9% (general  population was 13.2%, no significant increased risk compared to general population).  She underwent an endometrial biopsy on 8/18/2021 that revealed FIGO grade 1 endometrioid endometrial adenocarcinoma.  She then underwent a robotic hysterectomy, left oophorectomy, bilateral pelvic sentinel node dissection with partial omentectomy and peritoneal biopsies of cul-de-sac nodules.  Pathology revealed endometrioid endometrial adenocarcinoma, FIGO 1 with less than 50% invasion.  Margins were negative, lymph nodes were negative, cul-de-sac nodules were positive for metastatic endometrial carcinoma.  Originally, it was discussed that the cul-de-sac lesions were from her ovarian cancer, this would stage her right ovarian cancer as a pT2b, N0, M0 stage IIB and would stage her endometrial cancer as a pT1a, N0, M0 stage IA.  Per an addendum on 9/17/2021, further discussion suggested a pT3a, N0, M0 stage IIIA endometrial cancer.  Patient was seen by Dr. Velazquez on 9/23/2021, plan was to treat with adjuvant carbo Taxol for 6 cycles.  Due to the fact that the patient lives in [place], [place], it was decided to refer to an oncologist in [place], [place] as this was closer to home.  I initially saw the patient on 10/18/2021 at that visit, she stated to overall be feeling well.  She did have some numbness and tingling, also some depression and anxiety, but had no other major issues to discuss.  Patient started her treatment on 10/27/2021, completed on 2/16/2022.  Surveillance CT scans of the chest, abdomen, and pelvis started on 4/11/2022, these showed no evidence of disease. Most recently done on 08/14/2023 showing interim increase in size of the small nodule at the right suprahilar region, previously measuring 3-4 mm, now measuring 6-7 mm. CT scan of the chest, abdomen, and pelvis done on 11/22/2023 Enlarging right apical lung nodule now measuring 1.1 x 1.0 cm.  There was a new 1.0 cm soft tissue nodule in the fat and  musculature of the left lateral lower abdominal wall.    PET/CT scan done on 12/06/2023 showed hypermetabolic 1.0 cm solid right upper lobe nodule with 2 hypermetabolic lymph nodes, 1 in the right axilla and 1 in the portacaval system.  There was a 1.1 cm nodule in the left lower anterior abdominal wall that was also hypermetabolic.    Right axillary lymph node done on 12/11/2023 showed benign findings with no evidence of malignancy.  There was still concern that malignancy was present, patient ended up at [facility].    Left lower abdominal wall nodule biopsy done on 01/25/2024 at [facility] revealed fragments of low-grade endometrioid adenocarcinoma.    Interval History:   Patient presents to clinic for scheduled follow up appointment to review biopsy results.  She had a biopsy on 01/25/2024, this showed recurrent malignancy.  She will be undergoing a lung biopsy in the near future at [facility].        Past Medical History:   Diagnosis Date    CVA (cerebral vascular accident)     Endometrial cancer     Malignant neoplasm of right ovary       Past Surgical History:   Procedure Laterality Date    ADENOIDECTOMY      CHOLECYSTECTOMY      HYSTERECTOMY      SALPINGOOPHORECTOMY      TONSILLECTOMY       Social History     Socioeconomic History    Marital status: Single   Tobacco Use    Smoking status: Never    Smokeless tobacco: Never   Substance and Sexual Activity    Alcohol use: Not Currently     Comment: 1-2 times per month    Drug use: Never    Sexual activity: Not Currently     Birth control/protection: Abstinence      Family History   Problem Relation Age of Onset    Diabetes Mother     Hypertension Mother     Depression Mother     Depression Father     Diabetes Father     Early death Father         Heart failure    Stroke Father     COPD Maternal Grandmother         CHF and COPD    Colon cancer Maternal Grandfather     Cancer Maternal Grandfather         Colon and Prostate    Depression Sister     Depression  Sister     Diabetes Sister       Review of patient's allergies indicates:  No Known Allergies   Review of Systems   Constitutional:  Negative for appetite change and unexpected weight change.   HENT:  Negative for mouth sores.    Eyes:  Negative for visual disturbance.   Respiratory:  Negative for cough and shortness of breath.    Cardiovascular:  Negative for chest pain.   Gastrointestinal:  Negative for abdominal pain and diarrhea.   Genitourinary:  Negative for frequency.   Musculoskeletal:  Negative for back pain.   Integumentary:  Negative for rash.   Neurological:  Negative for headaches.   Hematological:  Negative for adenopathy.   Psychiatric/Behavioral:  The patient is not nervous/anxious.          Objective:      Physical Exam  Vitals reviewed. Exam conducted with a chaperone present.   Constitutional:       General: She is not in acute distress.     Appearance: Normal appearance.   HENT:      Head: Normocephalic and atraumatic.      Nose: Nose normal.      Mouth/Throat:      Mouth: Mucous membranes are moist.   Eyes:      Extraocular Movements: Extraocular movements intact.      Conjunctiva/sclera: Conjunctivae normal.   Cardiovascular:      Rate and Rhythm: Normal rate and regular rhythm.      Pulses: Normal pulses.      Heart sounds: Normal heart sounds.   Pulmonary:      Effort: Pulmonary effort is normal.      Breath sounds: Normal breath sounds.   Abdominal:      General: Bowel sounds are normal.      Palpations: Abdomen is soft.   Musculoskeletal:         General: No swelling. Normal range of motion.      Cervical back: Normal range of motion and neck supple.      Right lower leg: No edema.      Left lower leg: No edema.   Lymphadenopathy:      Cervical: No cervical adenopathy.   Skin:     General: Skin is warm and dry.   Neurological:      General: No focal deficit present.      Mental Status: She is alert and oriented to person, place, and time. Mental status is at baseline.   Psychiatric:          Mood and Affect: Mood normal.         Behavior: Behavior normal.         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/2021 with surgery (cul-de-sac nodules found during completion hysterectomy and salpingo-oophorectomy)  pT1a, N0, M0 stage IA endometrial cancer diagnosed 8/18/2021   Recurrent endometrial cancer of a left abdominal wall mass.     **An addendum to the pathology report from 9/7/2021 internal 9/17/2021 states that the pathologist and the submitting physician's physician assistant had a discussion, and the possibility that the ovary is a metastatic lesion from the endometrium was discussed with a metastasis to the ovary being favored by the clinician.  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  I do not feel that this would change my management, will proceed with 6 cycles of adjuvant carboplatin and paclitaxel.        Plan:     Patient completed 6 cycles of adjuvant carboplatin and paclitaxel on 02/16/2022.     CT scan on 11/22/2023 showed increasing size of the right apical nodule, now measuring 1.0 x 1.1 cm.  There was also a new 1.0 cm nodule in the left abdominal wall.    PET/CT scan done on 12/06/2023 shows hypermetabolic right lung nodule, left abdominal wall nodule, right axillary lymph node and portacaval lymph node.    Right axillary lymph node done on 12/11/2023 benign    Left lower abdominal wall nodule biopsy done on 01/25/2024 at [facility] shows recurrent endometrial cancer.    We will be undergoing a lung biopsy at [facility] in the very near future.  If positive, I think aggressive therapy with SBRT x2 would be a good option.    Return to clinic in 5 weeks     All questions were answered to the best of my ability and she understands the plan moving forward.      Dank Raymundo II, MD      I, Reshma Man LPN,  acted solely as a scribe for and in the presence of, Dr. Dank Raymundo who performed the service.

## 2024-01-29 ENCOUNTER — PATIENT MESSAGE (OUTPATIENT)
Dept: HEMATOLOGY/ONCOLOGY | Facility: CLINIC | Age: 39
End: 2024-01-29
Payer: COMMERCIAL

## 2024-01-29 ENCOUNTER — TELEPHONE (OUTPATIENT)
Dept: HEMATOLOGY/ONCOLOGY | Facility: CLINIC | Age: 39
End: 2024-01-29
Payer: COMMERCIAL

## 2024-01-29 NOTE — TELEPHONE ENCOUNTER
Patient would like to schedule visit / consultation with you. She messaged through the portal. I told her that I would notify you.

## 2024-01-31 ENCOUNTER — OFFICE VISIT (OUTPATIENT)
Dept: HEMATOLOGY/ONCOLOGY | Facility: CLINIC | Age: 39
End: 2024-01-31
Payer: COMMERCIAL

## 2024-01-31 VITALS
DIASTOLIC BLOOD PRESSURE: 75 MMHG | SYSTOLIC BLOOD PRESSURE: 111 MMHG | HEIGHT: 63 IN | OXYGEN SATURATION: 98 % | BODY MASS INDEX: 41.82 KG/M2 | RESPIRATION RATE: 18 BRPM | WEIGHT: 236 LBS | HEART RATE: 66 BPM

## 2024-01-31 DIAGNOSIS — C54.1 ENDOMETRIAL CANCER: ICD-10-CM

## 2024-01-31 DIAGNOSIS — C56.1 MALIGNANT NEOPLASM OF RIGHT OVARY: Primary | ICD-10-CM

## 2024-01-31 PROCEDURE — 3008F BODY MASS INDEX DOCD: CPT | Mod: CPTII,S$GLB,, | Performed by: INTERNAL MEDICINE

## 2024-01-31 PROCEDURE — 1159F MED LIST DOCD IN RCRD: CPT | Mod: CPTII,S$GLB,, | Performed by: INTERNAL MEDICINE

## 2024-01-31 PROCEDURE — 99999 PR PBB SHADOW E&M-EST. PATIENT-LVL IV: CPT | Mod: PBBFAC,,, | Performed by: INTERNAL MEDICINE

## 2024-01-31 PROCEDURE — 99214 OFFICE O/P EST MOD 30 MIN: CPT | Mod: S$GLB,,, | Performed by: INTERNAL MEDICINE

## 2024-01-31 PROCEDURE — 1160F RVW MEDS BY RX/DR IN RCRD: CPT | Mod: CPTII,S$GLB,, | Performed by: INTERNAL MEDICINE

## 2024-01-31 PROCEDURE — 3074F SYST BP LT 130 MM HG: CPT | Mod: CPTII,S$GLB,, | Performed by: INTERNAL MEDICINE

## 2024-01-31 PROCEDURE — 3078F DIAST BP <80 MM HG: CPT | Mod: CPTII,S$GLB,, | Performed by: INTERNAL MEDICINE

## 2024-01-31 RX ORDER — MULTIVITAMIN
TABLET ORAL
COMMUNITY

## 2024-01-31 RX ORDER — DIPHENHYDRAMINE HCL 25 MG
25 CAPSULE ORAL
COMMUNITY

## 2024-02-01 ENCOUNTER — PATIENT MESSAGE (OUTPATIENT)
Dept: HEMATOLOGY/ONCOLOGY | Facility: CLINIC | Age: 39
End: 2024-02-01
Payer: COMMERCIAL

## 2024-02-01 DIAGNOSIS — C56.1 MALIGNANT NEOPLASM OF RIGHT OVARY: Primary | ICD-10-CM

## 2024-02-06 ENCOUNTER — CLINICAL SUPPORT (OUTPATIENT)
Dept: HEMATOLOGY/ONCOLOGY | Facility: CLINIC | Age: 39
End: 2024-02-06
Payer: COMMERCIAL

## 2024-02-06 DIAGNOSIS — F32.A DEPRESSION: Primary | ICD-10-CM

## 2024-02-06 PROCEDURE — 99999 PR PBB SHADOW E&M-EST. PATIENT-LVL I: CPT | Mod: PBBFAC,,,

## 2024-02-14 ENCOUNTER — OFFICE VISIT (OUTPATIENT)
Dept: HEMATOLOGY/ONCOLOGY | Facility: CLINIC | Age: 39
End: 2024-02-14
Payer: COMMERCIAL

## 2024-02-14 DIAGNOSIS — C56.1 MALIGNANT NEOPLASM OF RIGHT OVARY: ICD-10-CM

## 2024-02-14 DIAGNOSIS — C54.1 ENDOMETRIAL CANCER: Primary | ICD-10-CM

## 2024-02-14 DIAGNOSIS — Z80.42 FAMILY HISTORY OF MALIGNANT NEOPLASM OF PROSTATE: ICD-10-CM

## 2024-02-14 PROCEDURE — 99215 OFFICE O/P EST HI 40 MIN: CPT | Mod: 95,,, | Performed by: NURSE PRACTITIONER

## 2024-02-14 NOTE — PROGRESS NOTES
REFERRING PROVIDER: Dr. Dank Raymundo      Patient ID: Ruben Clements is a 39 y.o. female.    Chief Complaint: Personal history of ovarian cancer and endometrial cancer both daignosed 36y; now with recurrent disease, and a family history of cancer. Patient presented via Telemedicine Visit (audio and video) today for risk assessment, genetic counseling, and consideration for genetic testing.    HPI  Past Medical History:   Diagnosis Date    CVA (cerebral vascular accident)     Endometrial cancer 08/18/2021    Malignant neoplasm of right ovary 06/08/2021        Past Surgical History:   Procedure Laterality Date    ADENOIDECTOMY      CHOLECYSTECTOMY      HYSTERECTOMY      SALPINGOOPHORECTOMY      TONSILLECTOMY          Review of patient's allergies indicates:  Patient has no known allergies.     Review of Systems        Problem List Items Addressed This Visit    None       Oncology History    No history exists.      Family History   Problem Relation Age of Onset    Diabetes Mother     Hypertension Mother     Depression Mother     Depression Father     Diabetes Father     Early death Father         Heart failure    Stroke Father     Depression Sister     Depression Sister     Diabetes Sister     COPD Maternal Grandmother         CHF and COPD    Colon cancer Maternal Grandfather 60    Prostate cancer Maternal Grandfather 60    Stomach cancer Maternal Great-Grandmother       NOTE: mother has no siblings  NOTE: patient reported small percentage Ashkenazi Restorationist maternal ancestry       Assessment:   Risk Assessment:  This patient is at increased risk of having an inherited genetic mutation that increases the risk for cancer. Patient meets criteria for genetic testing based on the National Comprehensive Cancer Network (NCCN) criteria due to a personal history of ovarian cancer and endometrial cancer, with additional evidence due to family history that includes prostate cancer and colon cancer (maternal grandfather) and  Ashkenazi Latter day ancestry (see family history and pedigree). Based on the likelihood of having a mutation, BRCA1/2 Analysis with Conject CancerNext-Expanded+RNAinsight panel testing was described in detail.    Education and Counseling:  Conject CancerNext-Expanded evaluates a broad number of hereditary cancer syndromes to help define patients' cancer risk. This cancer panel tests (77 total): AIP, ALK, APC*, DAINA*, AXIN2, BAP1, BARD1, BLM, BMPR1A, BRCA1*, BRCA2*, BRIP1*, CDC73, CDH1*, CDK4, CDKN1B, CDKN2A, CHEK2*, CTNNA1, DICER1, FANCC, FH, FLCN, GALNT12, KIF1B, LZTR1, MAX, MEN1, MET, MLH1*, MSH2*, MSH3, MSH6*, MUTYH*, NBN, NF1*, NF2, NTHL1, PALB2*, PHOX2B, PMS2*, POT1, YJFDX4Q, PTCH1, PTEN*, RAD51C*, RAD51D*, RB1, RECQL, RET, SDHA, SDHAF2, SDHB, SDHC, SDHD, SMAD4, SMARCA4, SMARCB1, SMARCE1, STK11, SUFU, WCAR500, TP53*, TSC1, TSC2, VHL and XRCC2 (sequencing and deletion/duplication); EGFR, EGLN1, HOXB13, KIT, MITF, PDGFRA, POLD1 and POLE (sequencing only); EPCAM and GREM1 (deletion/duplication only). DNA and RNA analyses performed for * genes.    Risks of cancer associated with inherited cancer predisposition mutations were discussed in detail.  If a mutation were found, this patient would have a significantly increased risk for cancer.  Inherited cancer syndromes included in this test, may have different, but still significant risk for cancer.  Risk of cancer with any particular gene mutation will be discussed at the time of results disclosure and based on the results.    The availability of clinical management options for inherited cancer predisposition mutation carriers was discussed, including increased surveillance, chemoprevention, and prophylactic surgery. Details of the testing process, including benefits and limitations of genetic analysis as well as the implications of possible test results, were discussed.  Because this patient is the first member of the family to be tested comprehensive panel  testing was presented.  Related insurance issues were discussed.      Summary:  This patient was evaluated for hereditary risk of cancer and was found to be at an increased risk of having an inherited cancer predisposition gene mutation.  The option of genetic testing was explained in detail, including the possible impact of this information on family members.  Since this patient wishes to proceed with testing an informed consent was obtained and blood drawn and sent to [facility].  Results will be expected 4 weeks from this time.  A follow-up appointment will be scheduled for results disclosure.       The patient location is: home.     Visit type: audiovisual    Face to Face time with patient: 35 minutes  >40 minutes of total time spent on the encounter, which includes face to face time and non-face to face time preparing to see the patient (eg, review of tests), Obtaining and/or reviewing separately obtained history, Documenting clinical information in the electronic or other health record, Independently interpreting results (not separately reported) and communicating results to the patient/family/caregiver, or Care coordination (not separately reported).       Each patient to whom he or she provides medical services by telemedicine is:  (1) informed of the relationship between the physician and patient and the respective role of any other health care provider with respect to management of the patient; and (2) notified that he or she may decline to receive medical services by telemedicine and may withdraw from such care at any time.    ROD FENTON, PhD    Answers submitted by the patient for this visit:  Review of Systems Questionnaire (Submitted on 2/12/2024)  appetite change : No  unexpected weight change: No  mouth sores: No  visual disturbance: No  cough: No  shortness of breath: No  chest pain: No  abdominal pain: No  diarrhea: No  frequency: No  back pain: Yes  rash: No  headaches: Yes  adenopathy:  No  nervous/ anxious: No

## 2024-02-27 ENCOUNTER — CLINICAL SUPPORT (OUTPATIENT)
Dept: HEMATOLOGY/ONCOLOGY | Facility: CLINIC | Age: 39
End: 2024-02-27
Payer: COMMERCIAL

## 2024-02-27 DIAGNOSIS — F32.A DEPRESSION: Primary | ICD-10-CM

## 2024-02-27 PROCEDURE — 99999 PR PBB SHADOW E&M-EST. PATIENT-LVL I: CPT | Mod: PBBFAC,,,

## 2024-02-27 NOTE — NURSING
I met with Ruben for a therapy session. We continue to work on goals and emotional stability. We scheduled another appointment on 3/19/2024.

## 2024-03-06 ENCOUNTER — OFFICE VISIT (OUTPATIENT)
Dept: HEMATOLOGY/ONCOLOGY | Facility: CLINIC | Age: 39
End: 2024-03-06
Payer: COMMERCIAL

## 2024-03-06 ENCOUNTER — PATIENT MESSAGE (OUTPATIENT)
Dept: HEMATOLOGY/ONCOLOGY | Facility: CLINIC | Age: 39
End: 2024-03-06

## 2024-03-06 VITALS
RESPIRATION RATE: 18 BRPM | HEIGHT: 63 IN | SYSTOLIC BLOOD PRESSURE: 100 MMHG | BODY MASS INDEX: 42.11 KG/M2 | DIASTOLIC BLOOD PRESSURE: 66 MMHG | OXYGEN SATURATION: 99 % | WEIGHT: 237.63 LBS | HEART RATE: 88 BPM

## 2024-03-06 DIAGNOSIS — C54.1 ENDOMETRIAL CANCER: Primary | ICD-10-CM

## 2024-03-06 DIAGNOSIS — C56.1 MALIGNANT NEOPLASM OF RIGHT OVARY: ICD-10-CM

## 2024-03-06 PROCEDURE — 1159F MED LIST DOCD IN RCRD: CPT | Mod: CPTII,S$GLB,, | Performed by: INTERNAL MEDICINE

## 2024-03-06 PROCEDURE — 3078F DIAST BP <80 MM HG: CPT | Mod: CPTII,S$GLB,, | Performed by: INTERNAL MEDICINE

## 2024-03-06 PROCEDURE — 3008F BODY MASS INDEX DOCD: CPT | Mod: CPTII,S$GLB,, | Performed by: INTERNAL MEDICINE

## 2024-03-06 PROCEDURE — 99999 PR PBB SHADOW E&M-EST. PATIENT-LVL V: CPT | Mod: PBBFAC,,, | Performed by: INTERNAL MEDICINE

## 2024-03-06 PROCEDURE — 1160F RVW MEDS BY RX/DR IN RCRD: CPT | Mod: CPTII,S$GLB,, | Performed by: INTERNAL MEDICINE

## 2024-03-06 PROCEDURE — 99215 OFFICE O/P EST HI 40 MIN: CPT | Mod: S$GLB,,, | Performed by: INTERNAL MEDICINE

## 2024-03-06 PROCEDURE — 3074F SYST BP LT 130 MM HG: CPT | Mod: CPTII,S$GLB,, | Performed by: INTERNAL MEDICINE

## 2024-03-06 RX ORDER — DEXAMETHASONE 4 MG/1
TABLET ORAL
Qty: 6 TABLET | Refills: 11 | Status: SHIPPED | OUTPATIENT
Start: 2024-03-12 | End: 2024-05-06

## 2024-03-06 RX ORDER — PROCHLORPERAZINE MALEATE 5 MG
10 TABLET ORAL EVERY 6 HOURS PRN
Qty: 20 TABLET | Refills: 5 | Status: SHIPPED | OUTPATIENT
Start: 2024-03-12 | End: 2024-06-04 | Stop reason: SDUPTHER

## 2024-03-06 RX ORDER — OLANZAPINE 5 MG/1
TABLET ORAL
Qty: 4 TABLET | Refills: 11 | Status: SHIPPED | OUTPATIENT
Start: 2024-03-12 | End: 2024-04-15

## 2024-03-06 RX ORDER — OFLOXACIN 3 MG/ML
SOLUTION/ DROPS OPHTHALMIC
COMMUNITY
Start: 2024-02-14

## 2024-03-06 NOTE — PLAN OF CARE
START ON PATHWAY REGIMEN - Uterine    [id number]        Paclitaxel       Carboplatin     **Always confirm dose/schedule in your pharmacy ordering system**    Patient Characteristics:  Endometrioid, Recurrent/Progressive Disease, Second Line - Distant Recurrence,   JENNIFER/pMMR, Relapse ? 12 Months From Prior Therapy  Histology: Endometrioid  Therapeutic Status: Recurrent or Progressive Disease  Microsatellite/Mismatch Repair Status: JENNIFER/pMMR  Line of Therapy: Second Line - Distant Recurrence  Time to Recurrence: Relapse ? 12 Months From Prior Therapy  Intent of Therapy:  Non-Curative / Palliative Intent, Discussed with Patient

## 2024-03-06 NOTE — PROGRESS NOTES
Subjective:       Patient ID: Ruben Clements is a 39 y.o. female.    Chief Complaint: Follow-up (Patient reports sob and headaches )      Medical oncologist in [place]:  Jonas Velazquez MD  GYN/ONC:  Darryn Pires MD     Diagnosis:  pT2b, N0, M0 stage IIB (due to cul-de-sac nodules) right ovarian cancer diagnosed 6/8/2021 with surgery (cul-de-sac nodules found during completion hysterectomy and salpingo-oophorectomy)  pT1a, N0, M0 stage IA endometrial cancer diagnosed 8/18/2021  Recurrence in the left abdominal sidewall diagnosed 01/25/2024 via biopsy at [facility].     **An addendum to the pathology report from 9/7/2021 internal 9/17/2021 states that the pathologist and the submitting physician's physician assistant had a discussion, and the possibility that the ovary is a metastatic lesion from the endometrium was discussed with a metastasis to the ovary being favored by the clinician.  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     Current Treatment:   Carboplatin and paclitaxel every 3 weeks to start on 03/13/2024 (since recurrence was greater than a year since her last carbo/taxol dose).    Treatment History:  Adjuvant carboplatin and paclitaxel given every 3 weeks for 6 cycles, started on 10/27/2021.  Completed on 2/16/2022.      HPI:  Patient who was having irregular menses, pelvic pain, abdominal pain along with nausea and vomiting.  She presented to see her OB/GYN, imaging was done that showed ovarian cyst.  She underwent a right oophorectomy on 6/8/2021, pathology revealed a 15 cm well-differentiated endometrioid adenocarcinoma involving the right ovary, right fallopian tube was uninvolved.  She was then referred to Dr. Darryn Pires with GYN oncology in [place], [place].  On 7/16/2021, she underwent [facility]ra hereditary cancer test, this was negative for pathogenic mutations.  Her [facility] breast cancer risk assessment was 13.9%  (general population was 13.2%, no significant increased risk compared to general population).  She underwent an endometrial biopsy on 8/18/2021 that revealed FIGO grade 1 endometrioid endometrial adenocarcinoma.  She then underwent a robotic hysterectomy, left oophorectomy, bilateral pelvic sentinel node dissection with partial omentectomy and peritoneal biopsies of cul-de-sac nodules.  Pathology revealed endometrioid endometrial adenocarcinoma, FIGO 1 with less than 50% invasion.  Margins were negative, lymph nodes were negative, cul-de-sac nodules were positive for metastatic endometrial carcinoma.  Originally, it was discussed that the cul-de-sac lesions were from her ovarian cancer, this would stage her right ovarian cancer as a pT2b, N0, M0 stage IIB and would stage her endometrial cancer as a pT1a, N0, M0 stage IA.  Per an addendum on 9/17/2021, further discussion suggested a pT3a, N0, M0 stage IIIA endometrial cancer.  Patient was seen by Dr. Velazquez on 9/23/2021, plan was to treat with adjuvant carbo Taxol for 6 cycles.  Due to the fact that the patient lives in [place], [place], it was decided to refer to an oncologist in [place], [place] as this was closer to home.  I initially saw the patient on 10/18/2021 at that visit, she stated to overall be feeling well.  She did have some numbness and tingling, also some depression and anxiety, but had no other major issues to discuss.  Patient started her treatment on 10/27/2021, completed on 2/16/2022.  Surveillance CT scans of the chest, abdomen, and pelvis started on 4/11/2022, these showed no evidence of disease. Most recently done on 08/14/2023 showing interim increase in size of the small nodule at the right suprahilar region, previously measuring 3-4 mm, now measuring 6-7 mm. CT scan of the chest, abdomen, and pelvis done on 11/22/2023 Enlarging right apical lung nodule now measuring 1.1 x 1.0 cm.  There was a new 1.0 cm soft tissue nodule in the fat  and musculature of the left lateral lower abdominal wall.    PET/CT scan done on 12/06/2023 showed hypermetabolic 1.0 cm solid right upper lobe nodule with 2 hypermetabolic lymph nodes, 1 in the right axilla and 1 in the portacaval system.  There was a 1.1 cm nodule in the left lower anterior abdominal wall that was also hypermetabolic.    Right axillary lymph node done on 12/11/2023 showed benign findings with no evidence of malignancy.  There was still concern that malignancy was present, patient ended up at [facility].    Left lower abdominal wall nodule biopsy done on 01/25/2024 at [facility] revealed fragments of low-grade endometrioid adenocarcinoma.    She then underwent lung biopsy of the right upper lobe lung nodule at [facility] on 02/20/2024, this returned positive for metastatic endometrial carcinoma.    Interval History:   Patient presents to clinic for scheduled follow up appointment to review biopsy results.  She had a biopsy on 02/20/2024.  This also showed recurrent malignancy.  Discussions were had about the patient having systemic therapy.  She requests systemic therapy to begin as soon as possible.      Past Medical History:   Diagnosis Date    CVA (cerebral vascular accident)     Endometrial cancer 08/18/2021    Malignant neoplasm of right ovary 06/08/2021      Past Surgical History:   Procedure Laterality Date    ADENOIDECTOMY      CHOLECYSTECTOMY      HYSTERECTOMY      SALPINGOOPHORECTOMY      TONSILLECTOMY       Social History     Socioeconomic History    Marital status: Single   Tobacco Use    Smoking status: Never    Smokeless tobacco: Never   Substance and Sexual Activity    Alcohol use: Not Currently     Comment: 1-2 times per month    Drug use: Never    Sexual activity: Not Currently     Birth control/protection: Abstinence      Family History   Problem Relation Age of Onset    Diabetes Mother     Hypertension Mother     Depression Mother     Depression Father     Diabetes Father      Early death Father         Heart failure    Stroke Father     Depression Sister     Depression Sister     Diabetes Sister     COPD Maternal Grandmother         CHF and COPD    Colon cancer Maternal Grandfather 60    Prostate cancer Maternal Grandfather 60    Stomach cancer Maternal Great-Grandmother       Review of patient's allergies indicates:  No Known Allergies   Review of Systems   Constitutional:  Negative for appetite change and unexpected weight change.   HENT:  Negative for mouth sores.    Eyes:  Negative for visual disturbance.   Respiratory:  Negative for cough and shortness of breath.    Cardiovascular:  Negative for chest pain.   Gastrointestinal:  Negative for abdominal pain and diarrhea.   Genitourinary:  Negative for frequency.   Musculoskeletal:  Negative for back pain.   Integumentary:  Negative for rash.   Neurological:  Negative for headaches.   Hematological:  Negative for adenopathy.   Psychiatric/Behavioral:  The patient is not nervous/anxious.          Objective:      Physical Exam  Vitals reviewed. Exam conducted with a chaperone present.   Constitutional:       General: She is not in acute distress.     Appearance: Normal appearance.   HENT:      Head: Normocephalic and atraumatic.      Nose: Nose normal.      Mouth/Throat:      Mouth: Mucous membranes are moist.   Eyes:      Extraocular Movements: Extraocular movements intact.      Conjunctiva/sclera: Conjunctivae normal.   Cardiovascular:      Rate and Rhythm: Normal rate and regular rhythm.      Pulses: Normal pulses.      Heart sounds: Normal heart sounds.   Pulmonary:      Effort: Pulmonary effort is normal.      Breath sounds: Normal breath sounds.   Abdominal:      General: Bowel sounds are normal.      Palpations: Abdomen is soft.   Musculoskeletal:         General: No swelling. Normal range of motion.      Cervical back: Normal range of motion and neck supple.      Right lower leg: No edema.      Left lower leg: No edema.    Lymphadenopathy:      Cervical: No cervical adenopathy.   Skin:     General: Skin is warm and dry.   Neurological:      General: No focal deficit present.      Mental Status: She is alert and oriented to person, place, and time. Mental status is at baseline.   Psychiatric:         Mood and Affect: Mood normal.         Behavior: Behavior normal.         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/2021 with surgery (cul-de-sac nodules found during completion hysterectomy and salpingo-oophorectomy)  pT1a, N0, M0 stage IA endometrial cancer diagnosed 8/18/2021   Recurrent endometrial cancer of a left abdominal wall mass and right lung as of January 2024 in February 2024 respectively     **An addendum to the pathology report from 9/7/2021 internal 9/17/2021 states that the pathologist and the submitting physician's physician assistant had a discussion, and the possibility that the ovary is a metastatic lesion from the endometrium was discussed with a metastasis to the ovary being favored by the clinician.  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        Plan:     Patient completed 6 cycles of adjuvant carboplatin and paclitaxel on 02/16/2022.     CT scan on 11/22/2023 showed increasing size of the right apical nodule, now measuring 1.0 x 1.1 cm.  There was also a new 1.0 cm nodule in the left abdominal wall.    PET/CT scan done on 12/06/2023 shows hypermetabolic right lung nodule, left abdominal wall nodule, right axillary lymph node and portacaval lymph node.    Right axillary lymph node done on 12/11/2023 benign    Left lower abdominal wall nodule biopsy done on 01/25/2024 at [facility] shows recurrent endometrial cancer.    We will be undergoing a lung biopsy at [facility] in the very near future.  If positive, I think aggressive therapy with  SBRT x2 would be a good option.    The lung lesion was positive on 02/20/2024 for metastatic endometrial carcinoma.  The patient discuss with their gyn Oncology team possible treatment options, they recommended systemic therapy with carboplatin and paclitaxel.  The patient would like to treat with systemic therapy.    Retreating with carboplatin and paclitaxel-- planning to start on 03/13/2024    Will set up for patient education and labs prior to starting    Return to clinic for TD visit     All questions were answered to the best of my ability and she understands the plan moving forward.      Dank Raymundo II, MD I, Reshma Man LPN, acted solely as a scribe for and in the presence of, Dr. Dank Raymundo who performed the service.

## 2024-03-11 ENCOUNTER — LAB VISIT (OUTPATIENT)
Dept: LAB | Facility: HOSPITAL | Age: 39
End: 2024-03-11
Attending: INTERNAL MEDICINE
Payer: COMMERCIAL

## 2024-03-11 ENCOUNTER — OFFICE VISIT (OUTPATIENT)
Dept: HEMATOLOGY/ONCOLOGY | Facility: CLINIC | Age: 39
End: 2024-03-11
Payer: COMMERCIAL

## 2024-03-11 ENCOUNTER — CLINICAL SUPPORT (OUTPATIENT)
Dept: HEMATOLOGY/ONCOLOGY | Facility: CLINIC | Age: 39
End: 2024-03-11
Payer: COMMERCIAL

## 2024-03-11 VITALS
HEART RATE: 90 BPM | DIASTOLIC BLOOD PRESSURE: 68 MMHG | WEIGHT: 241 LBS | OXYGEN SATURATION: 99 % | HEIGHT: 63 IN | SYSTOLIC BLOOD PRESSURE: 109 MMHG | BODY MASS INDEX: 42.7 KG/M2

## 2024-03-11 DIAGNOSIS — C54.1 ENDOMETRIAL CANCER: Primary | ICD-10-CM

## 2024-03-11 DIAGNOSIS — C54.1 ENDOMETRIAL CANCER: ICD-10-CM

## 2024-03-11 DIAGNOSIS — C56.1 MALIGNANT NEOPLASM OF RIGHT OVARY: ICD-10-CM

## 2024-03-11 LAB
ALBUMIN SERPL-MCNC: 4.1 G/DL (ref 3.5–5)
ALBUMIN/GLOB SERPL: 1.3 RATIO (ref 1.1–2)
ALP SERPL-CCNC: 101 UNIT/L (ref 40–150)
ALT SERPL-CCNC: 23 UNIT/L (ref 0–55)
AST SERPL-CCNC: 16 UNIT/L (ref 5–34)
B-HCG SERPL QL: NEGATIVE
BASOPHILS # BLD AUTO: 0.03 X10(3)/MCL
BASOPHILS NFR BLD AUTO: 0.4 %
BILIRUB SERPL-MCNC: 0.4 MG/DL
BUN SERPL-MCNC: 10.4 MG/DL (ref 7–18.7)
CALCIUM SERPL-MCNC: 9.4 MG/DL (ref 8.4–10.2)
CANCER AG125 SERPL-ACNC: 9 UNIT/ML (ref 0–35)
CHLORIDE SERPL-SCNC: 107 MMOL/L (ref 98–107)
CO2 SERPL-SCNC: 27 MMOL/L (ref 22–29)
CREAT SERPL-MCNC: 0.64 MG/DL (ref 0.55–1.02)
EOSINOPHIL # BLD AUTO: 0.15 X10(3)/MCL (ref 0–0.9)
EOSINOPHIL NFR BLD AUTO: 1.8 %
ERYTHROCYTE [DISTWIDTH] IN BLOOD BY AUTOMATED COUNT: 12 % (ref 11.5–17)
GFR SERPLBLD CREATININE-BSD FMLA CKD-EPI: >60 MLS/MIN/1.73/M2
GLOBULIN SER-MCNC: 3.1 GM/DL (ref 2.4–3.5)
GLUCOSE SERPL-MCNC: 77 MG/DL (ref 74–100)
HCT VFR BLD AUTO: 40.3 % (ref 37–47)
HGB BLD-MCNC: 13.3 G/DL (ref 12–16)
IMM GRANULOCYTES # BLD AUTO: 0.03 X10(3)/MCL (ref 0–0.04)
IMM GRANULOCYTES NFR BLD AUTO: 0.4 %
LYMPHOCYTES # BLD AUTO: 1.99 X10(3)/MCL (ref 0.6–4.6)
LYMPHOCYTES NFR BLD AUTO: 23.5 %
MAGNESIUM SERPL-MCNC: 2.1 MG/DL (ref 1.6–2.6)
MCH RBC QN AUTO: 30.2 PG (ref 27–31)
MCHC RBC AUTO-ENTMCNC: 33 G/DL (ref 33–36)
MCV RBC AUTO: 91.4 FL (ref 80–94)
MONOCYTES # BLD AUTO: 0.44 X10(3)/MCL (ref 0.1–1.3)
MONOCYTES NFR BLD AUTO: 5.2 %
NEUTROPHILS # BLD AUTO: 5.82 X10(3)/MCL (ref 2.1–9.2)
NEUTROPHILS NFR BLD AUTO: 68.7 %
PLATELET # BLD AUTO: 360 X10(3)/MCL (ref 130–400)
PMV BLD AUTO: 9.5 FL (ref 7.4–10.4)
POTASSIUM SERPL-SCNC: 4.1 MMOL/L (ref 3.5–5.1)
PROT SERPL-MCNC: 7.2 GM/DL (ref 6.4–8.3)
RBC # BLD AUTO: 4.41 X10(6)/MCL (ref 4.2–5.4)
SODIUM SERPL-SCNC: 144 MMOL/L (ref 136–145)
WBC # SPEC AUTO: 8.46 X10(3)/MCL (ref 4.5–11.5)

## 2024-03-11 PROCEDURE — 1159F MED LIST DOCD IN RCRD: CPT | Mod: CPTII,S$GLB,,

## 2024-03-11 PROCEDURE — 36415 COLL VENOUS BLD VENIPUNCTURE: CPT

## 2024-03-11 PROCEDURE — 99999 PR PBB SHADOW E&M-EST. PATIENT-LVL IV: CPT | Mod: PBBFAC,,,

## 2024-03-11 PROCEDURE — 99999 PR PBB SHADOW E&M-EST. PATIENT-LVL I: CPT | Mod: PBBFAC,,,

## 2024-03-11 PROCEDURE — 3078F DIAST BP <80 MM HG: CPT | Mod: CPTII,S$GLB,,

## 2024-03-11 PROCEDURE — 83735 ASSAY OF MAGNESIUM: CPT

## 2024-03-11 PROCEDURE — 99215 OFFICE O/P EST HI 40 MIN: CPT | Mod: S$GLB,,,

## 2024-03-11 PROCEDURE — 3074F SYST BP LT 130 MM HG: CPT | Mod: CPTII,S$GLB,,

## 2024-03-11 PROCEDURE — 3008F BODY MASS INDEX DOCD: CPT | Mod: CPTII,S$GLB,,

## 2024-03-11 PROCEDURE — 86304 IMMUNOASSAY TUMOR CA 125: CPT

## 2024-03-11 PROCEDURE — 85025 COMPLETE CBC W/AUTO DIFF WBC: CPT

## 2024-03-11 PROCEDURE — 81025 URINE PREGNANCY TEST: CPT

## 2024-03-11 PROCEDURE — 80053 COMPREHEN METABOLIC PANEL: CPT

## 2024-03-11 NOTE — PROGRESS NOTES
Oncology Nutrition Evaluation      Ruben Clements   1985    Oncology Provider:   Dank Raymundo MD    Reason for Visit:  Change in Treatment Education    Oncology/Hematology Diagnosis:   1. 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 pT1a, N0, M0 stage IA endometrial cancer diagnosed 8/18/2021  3. Recurrence in the left abdominal sidewall diagnosed 01/25/2024 via biopsy at [facility].    Treatment Plan:  Carboplatin and paclitaxel every 3 weeks     Nutrition Recommendations:  1. Regular healthy diet as tolerated    Nutrition Assessment    3/11/24: This is a 39 y.o.female with a medical diagnosis of recurrent ovarian CA. She reports good appetite and po intake. No c/o n/v/c/d. She did have significant edema/bloating during previous chemo treatment. Wt has fluctuated up and down over the past year. She is interested in making healthy changes to her eating habits.    Nutrition Factors Affecting Intake  none identified    PMHx: CVA, essie, MAME/BSO    Allergies: Patient has no known allergies.    Current Medications:    Current Outpatient Medications:     aspirin 81 MG Chew, Take 81 mg by mouth., Disp: , Rfl:     buPROPion (WELLBUTRIN XL) 150 MG TB24 tablet, , Disp: , Rfl:     cetirizine (ZYRTEC) 10 MG tablet, Take 10 mg by mouth., Disp: , Rfl:     cloNIDine 0.1 mg/24 hr td ptwk (CATAPRES) 0.1 mg/24 hr, APPLY 1 PATCH TOPICALLY TO THE SKIN 1 TIME WEEKLY, Disp: , Rfl:     [START ON 3/12/2024] dexAMETHasone (DECADRON) 4 MG Tab, Take 8 mg (2 tablets) by mouth daily on days 2-4 of each chemotherapy cycle., Disp: 6 tablet, Rfl: 11    diphenhydrAMINE (BENADRYL) 25 mg capsule, Take 25 mg by mouth., Disp: , Rfl:     FLUoxetine 20 MG capsule, Take 20 mg by mouth once daily., Disp: , Rfl:     ketoconazole (NIZORAL) 2 % shampoo, SHAMPOO TWICE WEEKLY FOR SCALP AND FACE, Disp: , Rfl:  "    meloxicam (MOBIC) 15 MG tablet, Take 15 mg by mouth daily as needed., Disp: , Rfl:     multivitamin with folic acid 400 mcg Tab, Take by mouth., Disp: , Rfl:     ofloxacin (OCUFLOX) 0.3 % ophthalmic solution, INSTILL 3 DROPS TO AFFECTED EAR TWICE DAILY, Disp: , Rfl:     [START ON 3/12/2024] OLANZapine (ZYPREXA) 5 MG tablet, Take 1 tablet by mouth nightly on days 1-4 of each chemotherapy cycle., Disp: 4 tablet, Rfl: 11    [START ON 3/12/2024] prochlorperazine (COMPAZINE) 5 MG tablet, Take 2 tablets (10 mg total) by mouth every 6 (six) hours as needed for Nausea., Disp: 20 tablet, Rfl: 5    Labs: 3/11/24 CMP not resulted at time of visit    Anthropometrics    Height:   Ht Readings from Last 1 Encounters:   03/11/24 5' 3" (1.6 m)      Weight:   Wt Readings from Last 1 Encounters:   03/11/24 109.3 kg (241 lb)        Usual Body Weight: 98.9 kg (218 lb)  % Weight Change: +10.5% past 3months    BMI: 42.6 (obese III)    Ideal Weight: 52.2 kg (115 lb)  % Ideal Weight: 210%      Nutrition Diagnosis    No nutrition diagnosis at this time.    Nutrition Risk  low    Nutrition Intervention    Interventions(treatment strategy):  general/healthful diet      Nutrition Monitoring and Evaluation    Ongoing monitoring not warranted at this time. Please consult RD prn.        Clarisa Kathleen, MS, RD, , LDN                                                                                                                                                                                                                                                                                  "

## 2024-03-11 NOTE — PROGRESS NOTES
THERAPY EDUCATION: CARBOPLATIN + PACLITAXEL     Subjective:      Patient ID: Ruben Clements is a 39 y.o. female.    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       Current Treatment:   Carboplatin and paclitaxel every 3 weeks to start on 03/13/2024     Treatment History:  Adjuvant carboplatin and paclitaxel given every 3 weeks for 6 cycles, started on 10/27/2021.  Completed on 2/16/2022.      HPI:  Patient who was having irregular menses, pelvic pain, abdominal pain along with nausea and vomiting.  She presented to see her OB/GYN, imaging was done that showed ovarian cyst.  She underwent a right oophorectomy on 6/8/2021, pathology revealed a 15 cm well-differentiated endometrioid adenocarcinoma involving the right ovary, right fallopian tube was uninvolved.  She was then referred to Dr. Darryn Pires with GYN oncology in [place], [place].  On 7/16/2021, she underwent [facility] hereditary cancer test, this was negative for pathogenic mutations.  Her [facility] breast cancer risk assessment was 13.9% (general population was 13.2%, no significant increased risk compared to general population).  She underwent an endometrial biopsy on 8/18/2021 that revealed FIGO grade 1 endometrioid endometrial adenocarcinoma.  She then underwent a robotic hysterectomy, left oophorectomy, bilateral pelvic sentinel node dissection with partial omentectomy and peritoneal biopsies of cul-de-sac nodules.  Pathology revealed endometrioid endometrial adenocarcinoma, FIGO 1 with less than 50% invasion.  Margins were negative, lymph nodes were negative, cul-de-sac nodules were positive for  metastatic endometrial carcinoma.  Originally, it was discussed that the cul-de-sac lesions were from her ovarian cancer, this would stage her right ovarian cancer as a pT2b, N0, M0 stage IIB and would stage her endometrial cancer as a pT1a, N0, M0 stage IA.  Per an addendum on 9/17/2021, further discussion suggested a pT3a, N0, M0 stage IIIA endometrial cancer.  Patient was seen by Dr. Velazquez on 9/23/2021, plan was to treat with adjuvant carbo Taxol for 6 cycles.  Due to the fact that the patient lives in [place], [place], it was decided to refer to an oncologist in [place], [place] as this was closer to home.  I initially saw the patient on 10/18/2021 at that visit, she stated to overall be feeling well.  She did have some numbness and tingling, also some depression and anxiety, but had no other major issues to discuss.  Patient started her treatment on 10/27/2021, completed on 2/16/2022.  Surveillance CT scans of the chest, abdomen, and pelvis started on 4/11/2022, these showed no evidence of disease. Most recently done on 08/14/2023 showing interim increase in size of the small nodule at the right suprahilar region, previously measuring 3-4 mm, now measuring 6-7 mm. CT scan of the chest, abdomen, and pelvis done on 11/22/2023 Enlarging right apical lung nodule now measuring 1.1 x 1.0 cm.  There was a new 1.0 cm soft tissue nodule in the fat and musculature of the left lateral lower abdominal wall.    PET/CT scan done on 12/06/2023 showed hypermetabolic 1.0 cm solid right upper lobe nodule with 2 hypermetabolic lymph nodes, 1 in the right axilla and 1 in the portacaval system.  There was a 1.1 cm nodule in the left lower anterior abdominal wall that was also hypermetabolic.    Right axillary lymph node done on 12/11/2023 showed benign findings with no evidence of malignancy.  There was still concern that malignancy was present, patient ended up at [facility].    Left lower abdominal wall nodule biopsy done  on 01/25/2024 at [facility] revealed fragments of low-grade endometrioid adenocarcinoma.    She then underwent lung biopsy of the right upper lobe lung nodule at [facility] on 02/20/2024, this returned positive for metastatic endometrial carcinoma.    Interval History:     3/11/24: Ms. Clements presents to the clinic by herself for therapy education. Patient reports no major complaints at today's visit. Denies fever, chills, SOB, N/V/D, constipation, recent infection, chest pain or unexplained bleeding or bruising.      3/6/24:Patient presents to clinic for scheduled follow up appointment to review biopsy results.  She had a biopsy on 02/20/2024.  This also showed recurrent malignancy.  Discussions were had about the patient having systemic therapy.  She requests systemic therapy to begin as soon as possible.      Past Medical History:   Diagnosis Date    CVA (cerebral vascular accident)     Endometrial cancer 08/18/2021    Malignant neoplasm of right ovary 06/08/2021      Past Surgical History:   Procedure Laterality Date    ADENOIDECTOMY      CHOLECYSTECTOMY      HYSTERECTOMY      SALPINGOOPHORECTOMY      TONSILLECTOMY       Social History     Socioeconomic History    Marital status: Single   Tobacco Use    Smoking status: Never    Smokeless tobacco: Never   Substance and Sexual Activity    Alcohol use: Not Currently     Comment: 1-2 times per month    Drug use: Never    Sexual activity: Not Currently     Birth control/protection: Abstinence      Family History   Problem Relation Age of Onset    Diabetes Mother     Hypertension Mother     Depression Mother     Depression Father     Diabetes Father     Early death Father         Heart failure    Stroke Father     Depression Sister     Depression Sister     Diabetes Sister     COPD Maternal Grandmother         CHF and COPD    Colon cancer Maternal Grandfather 60    Prostate cancer Maternal Grandfather 60    Stomach cancer Maternal Great-Grandmother       Review of  patient's allergies indicates:  No Known Allergies   Review of Systems   Constitutional:  Negative for appetite change and unexpected weight change.   HENT:  Negative for mouth sores.    Eyes:  Negative for visual disturbance.   Respiratory:  Negative for cough and shortness of breath.    Cardiovascular:  Negative for chest pain.   Gastrointestinal:  Negative for abdominal pain and diarrhea.   Genitourinary:  Negative for frequency.   Musculoskeletal:  Negative for back pain.   Integumentary:  Negative for rash.   Neurological:  Negative for headaches.   Hematological:  Negative for adenopathy.   Psychiatric/Behavioral:  The patient is not nervous/anxious.        Objective:     Assessment:     pT2b, N0, M0 stage IIB (due to cul-de-sac nodules) right ovarian cancer diagnosed 6/8/2021 with surgery (cul-de-sac nodules found during completion hysterectomy and salpingo-oophorectomy)  pT1a, N0, M0 stage IA endometrial cancer diagnosed 8/18/2021   Recurrent endometrial cancer of a left abdominal wall mass and right lung   Plan:   -Therapy education completed on 3/11/24.  -Mediport intact with continuation of port flushes   -Patient completed 6 cycles of adjuvant carboplatin and paclitaxel on 02/16/2022.Plans to retreat with carboplatin and paclitaxel-- planning to start on 03/13/2024. Patient understood that he will receive premedications given 30 minutes prior to each treatment to help prevent nausea.   -Compazine PRN prescribed for at home with instructions to be taken by mouth every 6 hours as needed for nausea.   -OTC Imodium AD recommended for diarrhea (4-6 BMs a day). Take 2 tablets after the first loose bowel movement, and 1 tablet after each loose bowel movement after the first dose has been taken. No more than 4 tablets should be taken in any 24-hour period. If not working, Lomotil can be prescribed as 2nd choice.    -Mouth sore prevention with 1-quart warm water with 1 tsp of baking soda and salt and alcohol-free  mouthwash.   -Emphasized adequate hand-hygiene and limited contact with people who are sick.   -Monitor and notify any bleeding in urine, stool, or sputum.  As well as unusual bleeding or bruising and stomach pain.   - Emphasized hydration with 4 16 oz bottles of water a day.    -Call clinic if fever >100.4, shakes or chills, shortness of breath, chest pain, uncontrolled vomiting or diarrhea, pain and tingling in the chest or arm, or just not feeling well.    -Plans to RTC on 4/2/24 for same day labs and toxicity check with MD.    DISCUSSION:    1.  A total of 60 minutes were spent in counseling today, in which 100% were face-to-face.  At today's therapy teaching session, we discussed the patient's cancer diagnosis as well as planned therapy regimen, protocol, side effects and toxicities.  A handout of each therapeutic agent in the regimen was provided and reviewed in detail.    2.  The following side effects were discussed but not limited to:    Carboplatin Side Effects:  Allergic Reactions  Breathing Problems  Bruising  Chest Pain  Chills  Cough  Fever  Hair Loss  High Blood Pressure  Infection  Itching  Low Blood Counts  Metallic Taste  Nausea  Nosebleeds  Numbness  Pain  Rash  Swelling  Tingling  Vomiting     Paclitaxel Side Effects:  Allergic Reactions  Breathing Problems  Bruising  Chills  Cough  Diarrhea  Feeling Faint  Fever  Hair Loss  Infection  Injury  Itching  Joint Pain  Low Blood Pressure  Mouth Sores  Nausea  Numbness  Pain  Rash  Swelling  Tingling  Vomiting                a.  Discussed the risk of infection while on therapy related to pancytopenia, specifically a decrease in their white blood cell count.  Instructed to contact our office for temperature >100.4 F, chills, sudden onset cough or shortness of breath, symptoms of a urinary tract infection.                b.  Discussed the risk of anemia. Instructed to contact our office for dizziness, heart palpitations, or extreme or sudden changes in  weakness.                c.  Discussed the risk of thrombocytopenia, which increases the risk of bruising or bleeding.  Instructed the patient to contact our office for spontaneous signs of bleeding, including nose bleeds, bleeding from the gums or mouth, blood in sputum, urine or stool and unusual or excessive bruising or rash.                d.  Discussed GI side effects including weight changes, changes in appetite, altered sense of taste, stomatitis, nausea, vomiting, diarrhea, constipation, and heartburn.                e.  Discussed  side effects including painful urination, changes in the amount of urination, possible urine color changes.  Discussed fertility issues and to prevent  pregnancy if of child bearing age.                f.  Discussed neurological side effects including the risk of peripheral neuropathy, either temporary or permanent.                g.  Discussed the potential for skin, hair, and nail changes.       3.  Instructed to contact our office for discussion of medication changes, the addition of vitamin and/or herbal supplementation as they may interact with some chemotherapy agents.    4.  Discussed dietary modifications and the need to maintain adequate caloric intake and proper oral hydration.  Recommended 64 ounces of fluid per day.    5.  Discussed anti-emetic protocol and bowel regimen protocol.    6.  Office contact information given including after hours number.  Discussed there is an oncologist on call 24/7, 365 days including weekends.  Provided primary nurse's information .    7.  In summary, the patient is in agreement with the plan of care.  Questions appeared to be answered to their satisfaction. Consented the patient to the treatment plan and the patient was educated on the planned duration of the treatment and schedule of the treatment administration. Copy to be scanned into the chart.    All questions answered to the satisfaction of the patient and family.     Follow up  appointments given to lucia GREEN FNP-C  PATIENT EDUCATOR  [facility]

## 2024-03-13 ENCOUNTER — INFUSION (OUTPATIENT)
Dept: INFUSION THERAPY | Facility: HOSPITAL | Age: 39
End: 2024-03-13
Payer: COMMERCIAL

## 2024-03-13 VITALS
OXYGEN SATURATION: 97 % | HEART RATE: 69 BPM | BODY MASS INDEX: 42.52 KG/M2 | TEMPERATURE: 97 F | RESPIRATION RATE: 18 BRPM | DIASTOLIC BLOOD PRESSURE: 73 MMHG | SYSTOLIC BLOOD PRESSURE: 106 MMHG | HEIGHT: 63 IN | WEIGHT: 240 LBS

## 2024-03-13 DIAGNOSIS — C54.1 ENDOMETRIAL CANCER: Primary | ICD-10-CM

## 2024-03-13 PROCEDURE — 63600175 PHARM REV CODE 636 W HCPCS: Performed by: INTERNAL MEDICINE

## 2024-03-13 PROCEDURE — 96413 CHEMO IV INFUSION 1 HR: CPT

## 2024-03-13 PROCEDURE — A4216 STERILE WATER/SALINE, 10 ML: HCPCS | Performed by: INTERNAL MEDICINE

## 2024-03-13 PROCEDURE — 96415 CHEMO IV INFUSION ADDL HR: CPT

## 2024-03-13 PROCEDURE — 25000003 PHARM REV CODE 250: Performed by: INTERNAL MEDICINE

## 2024-03-13 PROCEDURE — 96417 CHEMO IV INFUS EACH ADDL SEQ: CPT

## 2024-03-13 PROCEDURE — 96375 TX/PRO/DX INJ NEW DRUG ADDON: CPT

## 2024-03-13 PROCEDURE — 96367 TX/PROPH/DG ADDL SEQ IV INF: CPT

## 2024-03-13 RX ORDER — PROCHLORPERAZINE EDISYLATE 5 MG/ML
10 INJECTION INTRAMUSCULAR; INTRAVENOUS ONCE AS NEEDED
Status: CANCELLED | OUTPATIENT
Start: 2024-03-13

## 2024-03-13 RX ORDER — HEPARIN 100 UNIT/ML
500 SYRINGE INTRAVENOUS
Status: DISCONTINUED | OUTPATIENT
Start: 2024-03-13 | End: 2024-03-13 | Stop reason: HOSPADM

## 2024-03-13 RX ORDER — PROCHLORPERAZINE EDISYLATE 5 MG/ML
10 INJECTION INTRAMUSCULAR; INTRAVENOUS ONCE AS NEEDED
Status: DISCONTINUED | OUTPATIENT
Start: 2024-03-13 | End: 2024-03-13 | Stop reason: HOSPADM

## 2024-03-13 RX ORDER — FAMOTIDINE 10 MG/ML
20 INJECTION INTRAVENOUS
Status: COMPLETED | OUTPATIENT
Start: 2024-03-13 | End: 2024-03-13

## 2024-03-13 RX ORDER — HEPARIN 100 UNIT/ML
500 SYRINGE INTRAVENOUS
Status: CANCELLED | OUTPATIENT
Start: 2024-03-13

## 2024-03-13 RX ORDER — DIPHENHYDRAMINE HYDROCHLORIDE 50 MG/ML
50 INJECTION INTRAMUSCULAR; INTRAVENOUS ONCE AS NEEDED
Status: DISCONTINUED | OUTPATIENT
Start: 2024-03-13 | End: 2024-03-13 | Stop reason: HOSPADM

## 2024-03-13 RX ORDER — FAMOTIDINE 10 MG/ML
20 INJECTION INTRAVENOUS
Status: CANCELLED | OUTPATIENT
Start: 2024-03-13

## 2024-03-13 RX ORDER — SODIUM CHLORIDE 0.9 % (FLUSH) 0.9 %
10 SYRINGE (ML) INJECTION
Status: DISCONTINUED | OUTPATIENT
Start: 2024-03-13 | End: 2024-03-13 | Stop reason: HOSPADM

## 2024-03-13 RX ORDER — EPINEPHRINE 0.3 MG/.3ML
0.3 INJECTION SUBCUTANEOUS ONCE AS NEEDED
Status: DISCONTINUED | OUTPATIENT
Start: 2024-03-13 | End: 2024-03-13 | Stop reason: HOSPADM

## 2024-03-13 RX ORDER — EPINEPHRINE 0.3 MG/.3ML
0.3 INJECTION SUBCUTANEOUS ONCE AS NEEDED
Status: CANCELLED | OUTPATIENT
Start: 2024-03-13

## 2024-03-13 RX ORDER — DIPHENHYDRAMINE HYDROCHLORIDE 50 MG/ML
50 INJECTION INTRAMUSCULAR; INTRAVENOUS ONCE AS NEEDED
Status: CANCELLED | OUTPATIENT
Start: 2024-03-13

## 2024-03-13 RX ORDER — SODIUM CHLORIDE 0.9 % (FLUSH) 0.9 %
10 SYRINGE (ML) INJECTION
Status: CANCELLED | OUTPATIENT
Start: 2024-03-13

## 2024-03-13 RX ADMIN — Medication 10 ML: at 09:03

## 2024-03-13 RX ADMIN — SODIUM CHLORIDE: 9 INJECTION, SOLUTION INTRAVENOUS at 09:03

## 2024-03-13 RX ADMIN — APREPITANT 130 MG: 130 INJECTION, EMULSION INTRAVENOUS at 09:03

## 2024-03-13 RX ADMIN — Medication 500 UNITS: at 03:03

## 2024-03-13 RX ADMIN — CARBOPLATIN 900 MG: 10 INJECTION, SOLUTION INTRAVENOUS at 02:03

## 2024-03-13 RX ADMIN — PACLITAXEL 384 MG: 6 INJECTION, SOLUTION INTRAVENOUS at 11:03

## 2024-03-13 RX ADMIN — DEXAMETHASONE SODIUM PHOSPHATE 0.25 MG: 10 INJECTION, SOLUTION INTRAMUSCULAR; INTRAVENOUS at 10:03

## 2024-03-13 RX ADMIN — DIPHENHYDRAMINE HYDROCHLORIDE 50 MG: 50 INJECTION INTRAMUSCULAR; INTRAVENOUS at 10:03

## 2024-03-13 RX ADMIN — FAMOTIDINE 20 MG: 10 INJECTION INTRAVENOUS at 09:03

## 2024-03-14 ENCOUNTER — PATIENT MESSAGE (OUTPATIENT)
Dept: HEMATOLOGY/ONCOLOGY | Facility: CLINIC | Age: 39
End: 2024-03-14
Payer: COMMERCIAL

## 2024-03-15 ENCOUNTER — PATIENT MESSAGE (OUTPATIENT)
Dept: HEMATOLOGY/ONCOLOGY | Facility: CLINIC | Age: 39
End: 2024-03-15
Payer: COMMERCIAL

## 2024-03-20 ENCOUNTER — OFFICE VISIT (OUTPATIENT)
Dept: HEMATOLOGY/ONCOLOGY | Facility: CLINIC | Age: 39
End: 2024-03-20
Payer: COMMERCIAL

## 2024-03-20 DIAGNOSIS — C56.1 MALIGNANT NEOPLASM OF RIGHT OVARY: ICD-10-CM

## 2024-03-20 DIAGNOSIS — C54.1 ENDOMETRIAL CANCER: Primary | ICD-10-CM

## 2024-03-20 PROCEDURE — 1159F MED LIST DOCD IN RCRD: CPT | Mod: CPTII,95,, | Performed by: NURSE PRACTITIONER

## 2024-03-20 PROCEDURE — 99213 OFFICE O/P EST LOW 20 MIN: CPT | Mod: 95,,, | Performed by: NURSE PRACTITIONER

## 2024-03-20 NOTE — PROGRESS NOTES
REFERRING PROVIDER: Dr. Dank Raymundo     Patient ID: Ruben Clements is a 39 y.o. female.    Chief Complaint: Personal history of ovarian cancer and endometrial cancer both daignosed 36y; now with recurrent disease, and a family history of cancer. Patient presented for genetic counseling on 2/14/2024 and was found appropriate for genetic testing  based on the National Comprehensive Cancer Network (NCCN) criteria due to a personal history of ovarian cancer and endometrial cancer, with additional evidence due to family history that includes prostate cancer and colon cancer (maternal grandfather) and Ashkenazi Yazidism ancestry (see family history and pedigree). Patient signed consent, lab was drawn and sent to [facility] for BRCA1/2 Analyses with CancerNext-Expanded+RNAinsight panel testing. Patient presented via Telemedicine Visit (audio and video) today for results disclosure.     HPI  Past Medical History:   Diagnosis Date    CVA (cerebral vascular accident)     Endometrial cancer 08/18/2021    Malignant neoplasm of right ovary 06/08/2021      Review of patient's allergies indicates:  No Known Allergies   Review of Systems   Constitutional:  Negative for appetite change and unexpected weight change.   HENT:  Negative for mouth sores.    Eyes:  Negative for visual disturbance.   Respiratory:  Positive for shortness of breath. Negative for cough.    Cardiovascular:  Negative for chest pain.   Gastrointestinal:  Negative for abdominal pain and diarrhea.   Genitourinary:  Negative for frequency.   Musculoskeletal:  Negative for back pain.   Integumentary:  Negative for rash.   Neurological:  Negative for headaches.   Hematological:  Negative for adenopathy.   Psychiatric/Behavioral:  The patient is not nervous/anxious.            Problem List Items Addressed This Visit    None    Oncology History   Endometrial cancer   7/12/2022 Initial Diagnosis    Endometrial cancer     3/13/2024 -  Chemotherapy    Treatment  Summary   Plan Name: OP GYN paclitaxel CARBOplatin (AUC 6) Q3W  Treatment Goal: Palliative  Status: Active  Start Date: 3/13/2024  End Date: 2/12/2025 (Planned)  Provider: Dank Raymundo II, MD  Chemotherapy: CARBOplatin (PARAPLATIN) 900 mg in sodium chloride 0.9% 655 mL chemo infusion, 900 mg (100 % of original dose 900 mg), Intravenous, Clinic/HOD 1 time, 1 of 17 cycles  Dose modification:   (original dose 900 mg, Cycle 1)  Administration: 900 mg (3/13/2024)  PACLitaxeL (TAXOL) 175 mg/m2 = 384 mg in sodium chloride 0.9% 500 mL chemo infusion, 175 mg/m2 = 384 mg, Intravenous, Clinic/HOD 1 time, 1 of 17 cycles  Administration: 384 mg (3/13/2024)              Family History:  Family History   Problem Relation Age of Onset    Diabetes Mother     Hypertension Mother     Depression Mother     Depression Father     Diabetes Father     Early death Father         Heart failure    Stroke Father     Depression Sister     Depression Sister     Diabetes Sister     COPD Maternal Grandmother         CHF and COPD    Colon cancer Maternal Grandfather 60    Prostate cancer Maternal Grandfather 60    Stomach cancer Maternal Great-Grandmother         Assessment:     Genetic testing was appropriate for this patient because of her personal and family history. This comprehensive [facility] CancerNext analysis indicated that there was no deleterious mutation in  (77 total): AIP, ALK, APC, DAINA, AXIN2, BAP1, BARD1, BLM, BMPR1A, BRCA1, BRCA2, BRIP1, CDC73, CDH1, CDK4, CDKN1B, CDKN2A, CHEK2, CTNNA1, DICER1, FANCC, FH, FLCN, GALNT12, KIF1B, LZTR1, MAX, MEN1, MET, MLH1, MSH2, MSH3, MSH6, MUTYH, NBN, NF1, NF2, NTHL1, PALB2, PHOX2B, PMS2, POT1, ONBAF1U, PTCH1, PTEN, RAD51C, RAD51D, RB1, RECQL, RET, SDHA, SDHAF2, SDHB, SDHC, SDHD, SMAD4, SMARCA4, SMARCB1, SMARCE1, STK11, SUFU, MIQJ237, TP53, TSC1, TSC2, VHL and XRCC2 (sequencing and deletion/duplication); EGFR, EGLN1, HOXB13, KIT, MITF, PDGFRA, POLD1 and POLE (sequencing  only); EPCAM and GREM1 (deletion/duplication only). RNA data is routinely analyzed for use in variant interpretation for all genes. However, there were two (2) variants of uncertain significance (VUS): BARD1 p.P24R (c.71C>G) and MSH2 p.T293A (c.877A>G). This BARD1 variant is predicted to be tolerated (benign) by in silico analyses. The in silico prediction for the MSH2 variant in inconclusive. There are currently insufficient data to determine if either variant does or does not cause increased cancer risk. Therefore, the contribution of each variant to the relative risk of cancer cannot be established from this analysis. If as a result of ongoing reclassification efforts, [facility] should determine that either variant becomes clinically actionable, an amended report will be sent to me as healthcare provider. I will then contact the patient for a discussion of implications of this new information and a healthcare plan will be made accordingly.      A copy of the result will be emailed to: [email or web address]     The patient location is: home.     Visit type: audiovisual    Face to Face time with patient: 10 minutes  20 minutes of total time spent on the encounter, which includes face to face time and non-face to face time preparing to see the patient (eg, review of tests), Obtaining and/or reviewing separately obtained history, Documenting clinical information in the electronic or other health record, Independently interpreting results (not separately reported) and communicating results to the patient/family/caregiver, or Care coordination (not separately reported).       Each patient to whom he or she provides medical services by telemedicine is:  (1) informed of the relationship between the physician and patient and the respective role of any other health care provider with respect to management of the patient; and (2) notified that he or she may decline to receive medical services by telemedicine and may  withdraw from such care at any time.    ROD FENTON, PhD

## 2024-04-02 ENCOUNTER — OFFICE VISIT (OUTPATIENT)
Dept: HEMATOLOGY/ONCOLOGY | Facility: CLINIC | Age: 39
End: 2024-04-02
Payer: COMMERCIAL

## 2024-04-02 ENCOUNTER — LAB VISIT (OUTPATIENT)
Dept: LAB | Facility: HOSPITAL | Age: 39
End: 2024-04-02
Attending: INTERNAL MEDICINE
Payer: COMMERCIAL

## 2024-04-02 VITALS
HEART RATE: 72 BPM | WEIGHT: 241.94 LBS | BODY MASS INDEX: 42.87 KG/M2 | RESPIRATION RATE: 18 BRPM | HEIGHT: 63 IN | TEMPERATURE: 98 F | SYSTOLIC BLOOD PRESSURE: 110 MMHG | DIASTOLIC BLOOD PRESSURE: 74 MMHG | OXYGEN SATURATION: 98 %

## 2024-04-02 DIAGNOSIS — C54.1 ENDOMETRIAL CANCER: Primary | ICD-10-CM

## 2024-04-02 DIAGNOSIS — G47.00 INSOMNIA, UNSPECIFIED TYPE: ICD-10-CM

## 2024-04-02 DIAGNOSIS — R12 HEART BURN: ICD-10-CM

## 2024-04-02 DIAGNOSIS — C56.1 MALIGNANT NEOPLASM OF RIGHT OVARY: ICD-10-CM

## 2024-04-02 DIAGNOSIS — C54.1 ENDOMETRIAL CANCER: ICD-10-CM

## 2024-04-02 LAB
ALBUMIN SERPL-MCNC: 3.7 G/DL (ref 3.5–5)
ALBUMIN/GLOB SERPL: 1.3 RATIO (ref 1.1–2)
ALP SERPL-CCNC: 106 UNIT/L (ref 40–150)
ALT SERPL-CCNC: 31 UNIT/L (ref 0–55)
AST SERPL-CCNC: 16 UNIT/L (ref 5–34)
BASOPHILS # BLD AUTO: 0.02 X10(3)/MCL
BASOPHILS NFR BLD AUTO: 0.3 %
BILIRUB SERPL-MCNC: 0.4 MG/DL
BUN SERPL-MCNC: 11 MG/DL (ref 7–18.7)
CALCIUM SERPL-MCNC: 9.1 MG/DL (ref 8.4–10.2)
CHLORIDE SERPL-SCNC: 107 MMOL/L (ref 98–107)
CO2 SERPL-SCNC: 25 MMOL/L (ref 22–29)
CREAT SERPL-MCNC: 0.73 MG/DL (ref 0.55–1.02)
EOSINOPHIL # BLD AUTO: 0.05 X10(3)/MCL (ref 0–0.9)
EOSINOPHIL NFR BLD AUTO: 0.6 %
ERYTHROCYTE [DISTWIDTH] IN BLOOD BY AUTOMATED COUNT: 12.7 % (ref 11.5–17)
GFR SERPLBLD CREATININE-BSD FMLA CKD-EPI: >60 MLS/MIN/1.73/M2
GLOBULIN SER-MCNC: 2.9 GM/DL (ref 2.4–3.5)
GLUCOSE SERPL-MCNC: 204 MG/DL (ref 74–100)
HCT VFR BLD AUTO: 38.3 % (ref 37–47)
HGB BLD-MCNC: 12.5 G/DL (ref 12–16)
IMM GRANULOCYTES # BLD AUTO: 0.12 X10(3)/MCL (ref 0–0.04)
IMM GRANULOCYTES NFR BLD AUTO: 1.5 %
LYMPHOCYTES # BLD AUTO: 1.65 X10(3)/MCL (ref 0.6–4.6)
LYMPHOCYTES NFR BLD AUTO: 20.7 %
MAGNESIUM SERPL-MCNC: 1.9 MG/DL (ref 1.6–2.6)
MCH RBC QN AUTO: 30 PG (ref 27–31)
MCHC RBC AUTO-ENTMCNC: 32.6 G/DL (ref 33–36)
MCV RBC AUTO: 92.1 FL (ref 80–94)
MONOCYTES # BLD AUTO: 0.38 X10(3)/MCL (ref 0.1–1.3)
MONOCYTES NFR BLD AUTO: 4.8 %
NEUTROPHILS # BLD AUTO: 5.76 X10(3)/MCL (ref 2.1–9.2)
NEUTROPHILS NFR BLD AUTO: 72.1 %
PLATELET # BLD AUTO: 254 X10(3)/MCL (ref 130–400)
PMV BLD AUTO: 8.9 FL (ref 7.4–10.4)
POTASSIUM SERPL-SCNC: 3.5 MMOL/L (ref 3.5–5.1)
PROT SERPL-MCNC: 6.6 GM/DL (ref 6.4–8.3)
RBC # BLD AUTO: 4.16 X10(6)/MCL (ref 4.2–5.4)
SODIUM SERPL-SCNC: 141 MMOL/L (ref 136–145)
WBC # SPEC AUTO: 7.98 X10(3)/MCL (ref 4.5–11.5)

## 2024-04-02 PROCEDURE — 36415 COLL VENOUS BLD VENIPUNCTURE: CPT

## 2024-04-02 PROCEDURE — 80053 COMPREHEN METABOLIC PANEL: CPT

## 2024-04-02 PROCEDURE — 3008F BODY MASS INDEX DOCD: CPT | Mod: CPTII,S$GLB,, | Performed by: INTERNAL MEDICINE

## 2024-04-02 PROCEDURE — 3078F DIAST BP <80 MM HG: CPT | Mod: CPTII,S$GLB,, | Performed by: INTERNAL MEDICINE

## 2024-04-02 PROCEDURE — 99215 OFFICE O/P EST HI 40 MIN: CPT | Mod: S$GLB,,, | Performed by: INTERNAL MEDICINE

## 2024-04-02 PROCEDURE — 99999 PR PBB SHADOW E&M-EST. PATIENT-LVL V: CPT | Mod: PBBFAC,,, | Performed by: INTERNAL MEDICINE

## 2024-04-02 PROCEDURE — 1160F RVW MEDS BY RX/DR IN RCRD: CPT | Mod: CPTII,S$GLB,, | Performed by: INTERNAL MEDICINE

## 2024-04-02 PROCEDURE — 83735 ASSAY OF MAGNESIUM: CPT

## 2024-04-02 PROCEDURE — 3074F SYST BP LT 130 MM HG: CPT | Mod: CPTII,S$GLB,, | Performed by: INTERNAL MEDICINE

## 2024-04-02 PROCEDURE — 85025 COMPLETE CBC W/AUTO DIFF WBC: CPT

## 2024-04-02 PROCEDURE — 1159F MED LIST DOCD IN RCRD: CPT | Mod: CPTII,S$GLB,, | Performed by: INTERNAL MEDICINE

## 2024-04-02 RX ORDER — SODIUM CHLORIDE 0.9 % (FLUSH) 0.9 %
10 SYRINGE (ML) INJECTION
Status: CANCELLED | OUTPATIENT
Start: 2024-04-03

## 2024-04-02 RX ORDER — DIPHENHYDRAMINE HYDROCHLORIDE 50 MG/ML
50 INJECTION INTRAMUSCULAR; INTRAVENOUS ONCE AS NEEDED
Status: CANCELLED | OUTPATIENT
Start: 2024-04-03

## 2024-04-02 RX ORDER — FAMOTIDINE 10 MG/ML
20 INJECTION INTRAVENOUS
Status: CANCELLED | OUTPATIENT
Start: 2024-04-03

## 2024-04-02 RX ORDER — TRAZODONE HYDROCHLORIDE 50 MG/1
50 TABLET ORAL NIGHTLY
Qty: 30 TABLET | Refills: 11 | Status: SHIPPED | OUTPATIENT
Start: 2024-04-02 | End: 2024-04-23 | Stop reason: SINTOL

## 2024-04-02 RX ORDER — HEPARIN 100 UNIT/ML
500 SYRINGE INTRAVENOUS
Status: CANCELLED | OUTPATIENT
Start: 2024-04-03

## 2024-04-02 RX ORDER — PROCHLORPERAZINE EDISYLATE 5 MG/ML
10 INJECTION INTRAMUSCULAR; INTRAVENOUS ONCE AS NEEDED
Status: CANCELLED | OUTPATIENT
Start: 2024-04-03

## 2024-04-02 RX ORDER — EPINEPHRINE 0.3 MG/.3ML
0.3 INJECTION SUBCUTANEOUS ONCE AS NEEDED
Status: CANCELLED | OUTPATIENT
Start: 2024-04-03

## 2024-04-02 RX ORDER — PANTOPRAZOLE SODIUM 40 MG/1
40 TABLET, DELAYED RELEASE ORAL DAILY
Qty: 30 TABLET | Refills: 1 | Status: SHIPPED | OUTPATIENT
Start: 2024-04-02 | End: 2024-06-03

## 2024-04-02 NOTE — PROGRESS NOTES
Subjective:       Patient ID: Ruben Clements is a 39 y.o. female.    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     **An addendum to the pathology report from 9/7/2021 internal 9/17/2021 states that the pathologist and the submitting physician's physician assistant had a discussion, and the possibility that the ovary is a metastatic lesion from the endometrium was discussed with a metastasis to the ovary being favored by the clinician.  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     Current Treatment:   Carboplatin and paclitaxel every 3 weeks started on 03/13/2024 (since recurrence was greater than a year since her last carbo/taxol dose). Treating in [place].    Treatment History:  Adjuvant carboplatin and paclitaxel given every 3 weeks for 6 cycles, started on 10/27/2021.  Completed on 2/16/2022.      HPI:  Patient who was having irregular menses, pelvic pain, abdominal pain along with nausea and vomiting.  She presented to see her OB/GYN, imaging was done that showed ovarian cyst.  She underwent a right oophorectomy on 6/8/2021, pathology revealed a 15 cm well-differentiated endometrioid adenocarcinoma involving the right ovary, right fallopian tube was uninvolved.  She was then referred to Dr. Darryn Pires with GYN oncology in [place], [place].  On 7/16/2021, she underwent Terra hereditary cancer test, this was negative for pathogenic mutations.  Her [facility]  breast cancer risk assessment was 13.9% (general population was 13.2%, no significant increased risk compared to general population).  She underwent an endometrial biopsy on 8/18/2021 that revealed FIGO grade 1 endometrioid endometrial adenocarcinoma.  She then underwent a robotic hysterectomy, left oophorectomy, bilateral pelvic sentinel node dissection with partial omentectomy and peritoneal biopsies of cul-de-sac nodules.  Pathology revealed endometrioid endometrial adenocarcinoma, FIGO 1 with less than 50% invasion.  Margins were negative, lymph nodes were negative, cul-de-sac nodules were positive for metastatic endometrial carcinoma.  Originally, it was discussed that the cul-de-sac lesions were from her ovarian cancer, this would stage her right ovarian cancer as a pT2b, N0, M0 stage IIB and would stage her endometrial cancer as a pT1a, N0, M0 stage IA.  Per an addendum on 9/17/2021, further discussion suggested a pT3a, N0, M0 stage IIIA endometrial cancer.  Patient was seen by Dr. Velazquez on 9/23/2021, plan was to treat with adjuvant carbo Taxol for 6 cycles.  Due to the fact that the patient lives in [place], [place], it was decided to refer to an oncologist in [place], [place] as this was closer to home.  I initially saw the patient on 10/18/2021 at that visit, she stated to overall be feeling well.  She did have some numbness and tingling, also some depression and anxiety, but had no other major issues to discuss.  Patient started her treatment on 10/27/2021, completed on 2/16/2022.  Surveillance CT scans of the chest, abdomen, and pelvis started on 4/11/2022, these showed no evidence of disease. Most recently done on 08/14/2023 showing interim increase in size of the small nodule at the right suprahilar region, previously measuring 3-4 mm, now measuring 6-7 mm. CT scan of the chest, abdomen, and pelvis done on 11/22/2023 Enlarging right apical lung nodule now measuring 1.1 x 1.0 cm.  There was a new  1.0 cm soft tissue nodule in the fat and musculature of the left lateral lower abdominal wall.    PET/CT scan done on 12/06/2023 showed hypermetabolic 1.0 cm solid right upper lobe nodule with 2 hypermetabolic lymph nodes, 1 in the right axilla and 1 in the portacaval system.  There was a 1.1 cm nodule in the left lower anterior abdominal wall that was also hypermetabolic.    Right axillary lymph node done on 12/11/2023 showed benign findings with no evidence of malignancy.  There was still concern that malignancy was present, patient ended up at [facility].    Left lower abdominal wall nodule biopsy done on 01/25/2024 at [facility] revealed fragments of low-grade endometrioid adenocarcinoma.    She then underwent lung biopsy of the right upper lobe lung nodule at [facility] on 02/20/2024, this returned positive for metastatic endometrial carcinoma.    Started carboplatin and paclitaxel on 03/13/2024.    Interval History:   Patient presents to clinic for scheduled follow up appointment. She started treatment for recurrent malignancy on 03/13/2024.  She stated that she had issues with sleeping and also started having some reflux.  Other than insomnia and heartburn, she had no other issues.      Past Medical History:   Diagnosis Date    CVA (cerebral vascular accident)     Endometrial cancer 08/18/2021    Malignant neoplasm of right ovary 06/08/2021      Past Surgical History:   Procedure Laterality Date    ADENOIDECTOMY      CHOLECYSTECTOMY      HYSTERECTOMY      SALPINGOOPHORECTOMY      TONSILLECTOMY       Social History     Socioeconomic History    Marital status: Single   Tobacco Use    Smoking status: Never    Smokeless tobacco: Never   Substance and Sexual Activity    Alcohol use: Not Currently     Comment: 1-2 times per month    Drug use: Never    Sexual activity: Not Currently     Birth control/protection: Abstinence      Family History   Problem Relation Age of Onset    Diabetes Mother     Hypertension  Mother     Depression Mother     Depression Father     Diabetes Father     Early death Father         Heart failure    Stroke Father     Depression Sister     Depression Sister     Diabetes Sister     COPD Maternal Grandmother         CHF and COPD    Colon cancer Maternal Grandfather 60    Prostate cancer Maternal Grandfather 60    Stomach cancer Maternal Great-Grandmother       Review of patient's allergies indicates:  No Known Allergies   Review of Systems   Constitutional:  Negative for appetite change and unexpected weight change.   HENT:  Negative for mouth sores.    Eyes:  Negative for visual disturbance.   Respiratory:  Negative for cough and shortness of breath.    Cardiovascular:  Negative for chest pain.   Gastrointestinal:  Negative for abdominal pain and diarrhea.   Genitourinary:  Negative for frequency.   Musculoskeletal:  Negative for back pain.   Integumentary:  Negative for rash.   Neurological:  Negative for headaches.   Hematological:  Negative for adenopathy.   Psychiatric/Behavioral:  The patient is not nervous/anxious.          Objective:      Physical Exam  Vitals reviewed. Exam conducted with a chaperone present.   Constitutional:       General: She is not in acute distress.     Appearance: Normal appearance.   HENT:      Head: Normocephalic and atraumatic.      Nose: Nose normal.      Mouth/Throat:      Mouth: Mucous membranes are moist.   Eyes:      Extraocular Movements: Extraocular movements intact.      Conjunctiva/sclera: Conjunctivae normal.   Cardiovascular:      Rate and Rhythm: Normal rate and regular rhythm.      Pulses: Normal pulses.      Heart sounds: Normal heart sounds.   Pulmonary:      Effort: Pulmonary effort is normal.      Breath sounds: Normal breath sounds.   Abdominal:      General: Bowel sounds are normal.      Palpations: Abdomen is soft.   Musculoskeletal:         General: No swelling. Normal range of motion.      Cervical back: Normal range of motion and neck  supple.      Right lower leg: No edema.      Left lower leg: No edema.   Lymphadenopathy:      Cervical: No cervical adenopathy.   Skin:     General: Skin is warm and dry.   Neurological:      General: No focal deficit present.      Mental Status: She is alert and oriented to person, place, and time. Mental status is at baseline.   Psychiatric:         Mood and Affect: Mood normal.         Behavior: Behavior normal.         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/2021 with surgery (cul-de-sac nodules found during completion hysterectomy and salpingo-oophorectomy)  pT1a, N0, M0 stage IA endometrial cancer diagnosed 8/18/2021   Recurrent endometrial cancer of a left abdominal wall mass and right lung as of January 2024 in February 2024 respectively     **An addendum to the pathology report from 9/7/2021 internal 9/17/2021 states that the pathologist and the submitting physician's physician assistant had a discussion, and the possibility that the ovary is a metastatic lesion from the endometrium was discussed with a metastasis to the ovary being favored by the clinician.  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        Plan:     Patient completed 6 cycles of adjuvant carboplatin and paclitaxel on 02/16/2022.     CT scan on 11/22/2023 showed increasing size of the right apical nodule, now measuring 1.0 x 1.1 cm.  There was also a new 1.0 cm nodule in the left abdominal wall.    PET/CT scan done on 12/06/2023 shows hypermetabolic right lung nodule, left abdominal wall nodule, right axillary lymph node and portacaval lymph node.    Right axillary lymph node done on 12/11/2023 benign    Left lower abdominal wall nodule biopsy done on 01/25/2024 at [facility] showed recurrent endometrial cancer.    The lung lesion was positive on 02/20/2024 for  metastatic endometrial carcinoma.  The patient discussed with their gyn Oncology team possible treatment options, they recommended systemic therapy with carboplatin and paclitaxel.  The patient would like to treat with systemic therapy.    Continue carboplatin and paclitaxel-- Started on 03/13/2024.     Proceed with cycle 2 tomorrow as scheduled in [place]     Protonix and Pepcid for heartburn     Trazodone for insomnia     Return to clinic for TD visit with labs    Labs: CBC, CMP, mag, UPT, and      All questions were answered to the best of my ability and she understands the plan moving forward.      Dank Raymundo II, MD I, Reshma Man LPN, acted solely as a scribe for and in the presence of, Dr. Dank Raymundo who performed the service.

## 2024-04-03 ENCOUNTER — PATIENT MESSAGE (OUTPATIENT)
Dept: HEMATOLOGY/ONCOLOGY | Facility: CLINIC | Age: 39
End: 2024-04-03
Payer: COMMERCIAL

## 2024-04-03 ENCOUNTER — INFUSION (OUTPATIENT)
Dept: INFUSION THERAPY | Facility: HOSPITAL | Age: 39
End: 2024-04-03
Attending: FAMILY MEDICINE
Payer: COMMERCIAL

## 2024-04-03 VITALS
BODY MASS INDEX: 43.27 KG/M2 | WEIGHT: 244.19 LBS | HEIGHT: 63 IN | DIASTOLIC BLOOD PRESSURE: 77 MMHG | HEART RATE: 75 BPM | SYSTOLIC BLOOD PRESSURE: 118 MMHG | OXYGEN SATURATION: 97 % | TEMPERATURE: 97 F

## 2024-04-03 DIAGNOSIS — C54.1 ENDOMETRIAL CANCER: Primary | ICD-10-CM

## 2024-04-03 PROCEDURE — A4216 STERILE WATER/SALINE, 10 ML: HCPCS | Performed by: INTERNAL MEDICINE

## 2024-04-03 PROCEDURE — 25000003 PHARM REV CODE 250: Performed by: INTERNAL MEDICINE

## 2024-04-03 PROCEDURE — 96375 TX/PRO/DX INJ NEW DRUG ADDON: CPT

## 2024-04-03 PROCEDURE — 96415 CHEMO IV INFUSION ADDL HR: CPT

## 2024-04-03 PROCEDURE — 96367 TX/PROPH/DG ADDL SEQ IV INF: CPT

## 2024-04-03 PROCEDURE — 96417 CHEMO IV INFUS EACH ADDL SEQ: CPT

## 2024-04-03 PROCEDURE — 63600175 PHARM REV CODE 636 W HCPCS: Performed by: INTERNAL MEDICINE

## 2024-04-03 PROCEDURE — 96413 CHEMO IV INFUSION 1 HR: CPT

## 2024-04-03 RX ORDER — SODIUM CHLORIDE 0.9 % (FLUSH) 0.9 %
10 SYRINGE (ML) INJECTION
Status: DISCONTINUED | OUTPATIENT
Start: 2024-04-03 | End: 2024-04-03 | Stop reason: HOSPADM

## 2024-04-03 RX ORDER — FAMOTIDINE 10 MG/ML
20 INJECTION INTRAVENOUS
Status: COMPLETED | OUTPATIENT
Start: 2024-04-03 | End: 2024-04-03

## 2024-04-03 RX ORDER — HEPARIN 100 UNIT/ML
500 SYRINGE INTRAVENOUS
Status: DISCONTINUED | OUTPATIENT
Start: 2024-04-03 | End: 2024-04-03 | Stop reason: HOSPADM

## 2024-04-03 RX ADMIN — DEXAMETHASONE SODIUM PHOSPHATE 0.25 MG: 10 INJECTION, SOLUTION INTRAMUSCULAR; INTRAVENOUS at 08:04

## 2024-04-03 RX ADMIN — Medication 10 ML: at 01:04

## 2024-04-03 RX ADMIN — HEPARIN 500 UNITS: 100 SYRINGE at 01:04

## 2024-04-03 RX ADMIN — DIPHENHYDRAMINE HYDROCHLORIDE 50 MG: 50 INJECTION, SOLUTION INTRAMUSCULAR; INTRAVENOUS at 08:04

## 2024-04-03 RX ADMIN — APREPITANT 130 MG: 130 INJECTION, EMULSION INTRAVENOUS at 08:04

## 2024-04-03 RX ADMIN — CARBOPLATIN 900 MG: 10 INJECTION, SOLUTION INTRAVENOUS at 01:04

## 2024-04-03 RX ADMIN — SODIUM CHLORIDE: 9 INJECTION, SOLUTION INTRAVENOUS at 09:04

## 2024-04-03 RX ADMIN — FAMOTIDINE 20 MG: 10 INJECTION INTRAVENOUS at 08:04

## 2024-04-03 RX ADMIN — PACLITAXEL 384 MG: 6 INJECTION, SOLUTION INTRAVENOUS at 09:04

## 2024-04-05 ENCOUNTER — PATIENT MESSAGE (OUTPATIENT)
Dept: HEMATOLOGY/ONCOLOGY | Facility: CLINIC | Age: 39
End: 2024-04-05
Payer: COMMERCIAL

## 2024-04-11 ENCOUNTER — PATIENT MESSAGE (OUTPATIENT)
Dept: HEMATOLOGY/ONCOLOGY | Facility: CLINIC | Age: 39
End: 2024-04-11
Payer: COMMERCIAL

## 2024-04-13 DIAGNOSIS — C54.1 ENDOMETRIAL CANCER: ICD-10-CM

## 2024-04-15 RX ORDER — OLANZAPINE 5 MG/1
TABLET ORAL
Qty: 4 TABLET | Refills: 2 | Status: SHIPPED | OUTPATIENT
Start: 2024-04-15

## 2024-04-23 ENCOUNTER — OFFICE VISIT (OUTPATIENT)
Dept: HEMATOLOGY/ONCOLOGY | Facility: CLINIC | Age: 39
End: 2024-04-23
Payer: COMMERCIAL

## 2024-04-23 ENCOUNTER — LAB VISIT (OUTPATIENT)
Dept: LAB | Facility: HOSPITAL | Age: 39
End: 2024-04-23
Attending: INTERNAL MEDICINE
Payer: COMMERCIAL

## 2024-04-23 VITALS
HEART RATE: 91 BPM | TEMPERATURE: 98 F | OXYGEN SATURATION: 97 % | HEIGHT: 62 IN | RESPIRATION RATE: 18 BRPM | WEIGHT: 247.13 LBS | DIASTOLIC BLOOD PRESSURE: 75 MMHG | BODY MASS INDEX: 45.48 KG/M2 | SYSTOLIC BLOOD PRESSURE: 107 MMHG

## 2024-04-23 DIAGNOSIS — C56.1 MALIGNANT NEOPLASM OF RIGHT OVARY: ICD-10-CM

## 2024-04-23 DIAGNOSIS — K21.9 GASTROESOPHAGEAL REFLUX DISEASE, UNSPECIFIED WHETHER ESOPHAGITIS PRESENT: ICD-10-CM

## 2024-04-23 DIAGNOSIS — Z79.899 ON ANTINEOPLASTIC CHEMOTHERAPY: ICD-10-CM

## 2024-04-23 DIAGNOSIS — R05.9 COUGH, UNSPECIFIED TYPE: ICD-10-CM

## 2024-04-23 DIAGNOSIS — C54.1 ENDOMETRIAL CANCER: Primary | ICD-10-CM

## 2024-04-23 DIAGNOSIS — C54.1 ENDOMETRIAL CANCER: ICD-10-CM

## 2024-04-23 DIAGNOSIS — G47.00 INSOMNIA, UNSPECIFIED TYPE: ICD-10-CM

## 2024-04-23 LAB
ALBUMIN SERPL-MCNC: 3.9 G/DL (ref 3.5–5)
ALBUMIN/GLOB SERPL: 1.2 RATIO (ref 1.1–2)
ALP SERPL-CCNC: 99 UNIT/L (ref 40–150)
ALT SERPL-CCNC: 38 UNIT/L (ref 0–55)
AST SERPL-CCNC: 20 UNIT/L (ref 5–34)
B-HCG SERPL QL: NEGATIVE
BASOPHILS # BLD AUTO: 0.02 X10(3)/MCL
BASOPHILS NFR BLD AUTO: 0.2 %
BILIRUB SERPL-MCNC: 0.4 MG/DL
BUN SERPL-MCNC: 11.7 MG/DL (ref 7–18.7)
CALCIUM SERPL-MCNC: 9.5 MG/DL (ref 8.4–10.2)
CHLORIDE SERPL-SCNC: 107 MMOL/L (ref 98–107)
CO2 SERPL-SCNC: 28 MMOL/L (ref 22–29)
CREAT SERPL-MCNC: 0.68 MG/DL (ref 0.55–1.02)
EOSINOPHIL # BLD AUTO: 0.03 X10(3)/MCL (ref 0–0.9)
EOSINOPHIL NFR BLD AUTO: 0.4 %
ERYTHROCYTE [DISTWIDTH] IN BLOOD BY AUTOMATED COUNT: 13.3 % (ref 11.5–17)
GFR SERPLBLD CREATININE-BSD FMLA CKD-EPI: >60 MLS/MIN/1.73/M2
GLOBULIN SER-MCNC: 3.3 GM/DL (ref 2.4–3.5)
GLUCOSE SERPL-MCNC: 155 MG/DL (ref 74–100)
HCT VFR BLD AUTO: 37 % (ref 37–47)
HGB BLD-MCNC: 12.4 G/DL (ref 12–16)
IMM GRANULOCYTES # BLD AUTO: 0.1 X10(3)/MCL (ref 0–0.04)
IMM GRANULOCYTES NFR BLD AUTO: 1.2 %
LYMPHOCYTES # BLD AUTO: 1.47 X10(3)/MCL (ref 0.6–4.6)
LYMPHOCYTES NFR BLD AUTO: 17.8 %
MAGNESIUM SERPL-MCNC: 1.9 MG/DL (ref 1.6–2.6)
MCH RBC QN AUTO: 30.7 PG (ref 27–31)
MCHC RBC AUTO-ENTMCNC: 33.5 G/DL (ref 33–36)
MCV RBC AUTO: 91.6 FL (ref 80–94)
MONOCYTES # BLD AUTO: 0.44 X10(3)/MCL (ref 0.1–1.3)
MONOCYTES NFR BLD AUTO: 5.3 %
NEUTROPHILS # BLD AUTO: 6.19 X10(3)/MCL (ref 2.1–9.2)
NEUTROPHILS NFR BLD AUTO: 75.1 %
PLATELET # BLD AUTO: 270 X10(3)/MCL (ref 130–400)
PMV BLD AUTO: 8.8 FL (ref 7.4–10.4)
POTASSIUM SERPL-SCNC: 3.6 MMOL/L (ref 3.5–5.1)
PROT SERPL-MCNC: 7.2 GM/DL (ref 6.4–8.3)
RBC # BLD AUTO: 4.04 X10(6)/MCL (ref 4.2–5.4)
SODIUM SERPL-SCNC: 143 MMOL/L (ref 136–145)
WBC # SPEC AUTO: 8.25 X10(3)/MCL (ref 4.5–11.5)

## 2024-04-23 PROCEDURE — 85025 COMPLETE CBC W/AUTO DIFF WBC: CPT

## 2024-04-23 PROCEDURE — 81025 URINE PREGNANCY TEST: CPT

## 2024-04-23 PROCEDURE — 99215 OFFICE O/P EST HI 40 MIN: CPT | Mod: S$GLB,,, | Performed by: NURSE PRACTITIONER

## 2024-04-23 PROCEDURE — 3078F DIAST BP <80 MM HG: CPT | Mod: CPTII,S$GLB,, | Performed by: NURSE PRACTITIONER

## 2024-04-23 PROCEDURE — 3074F SYST BP LT 130 MM HG: CPT | Mod: CPTII,S$GLB,, | Performed by: NURSE PRACTITIONER

## 2024-04-23 PROCEDURE — 36415 COLL VENOUS BLD VENIPUNCTURE: CPT

## 2024-04-23 PROCEDURE — 1159F MED LIST DOCD IN RCRD: CPT | Mod: CPTII,S$GLB,, | Performed by: NURSE PRACTITIONER

## 2024-04-23 PROCEDURE — 80053 COMPREHEN METABOLIC PANEL: CPT

## 2024-04-23 PROCEDURE — 1160F RVW MEDS BY RX/DR IN RCRD: CPT | Mod: CPTII,S$GLB,, | Performed by: NURSE PRACTITIONER

## 2024-04-23 PROCEDURE — 3008F BODY MASS INDEX DOCD: CPT | Mod: CPTII,S$GLB,, | Performed by: NURSE PRACTITIONER

## 2024-04-23 PROCEDURE — 99999 PR PBB SHADOW E&M-EST. PATIENT-LVL IV: CPT | Mod: PBBFAC,,, | Performed by: NURSE PRACTITIONER

## 2024-04-23 PROCEDURE — 83735 ASSAY OF MAGNESIUM: CPT

## 2024-04-23 RX ORDER — HEPARIN 100 UNIT/ML
500 SYRINGE INTRAVENOUS
Status: CANCELLED | OUTPATIENT
Start: 2024-04-24

## 2024-04-23 RX ORDER — PROCHLORPERAZINE EDISYLATE 5 MG/ML
10 INJECTION INTRAMUSCULAR; INTRAVENOUS ONCE AS NEEDED
Status: CANCELLED | OUTPATIENT
Start: 2024-04-24

## 2024-04-23 RX ORDER — FAMOTIDINE 10 MG/ML
20 INJECTION INTRAVENOUS
Status: CANCELLED | OUTPATIENT
Start: 2024-04-24

## 2024-04-23 RX ORDER — PROMETHAZINE HYDROCHLORIDE AND DEXTROMETHORPHAN HYDROBROMIDE 6.25; 15 MG/5ML; MG/5ML
5 SYRUP ORAL EVERY 6 HOURS PRN
Qty: 240 ML | Refills: 0 | Status: SHIPPED | OUTPATIENT
Start: 2024-04-23 | End: 2024-05-03

## 2024-04-23 RX ORDER — SODIUM CHLORIDE 0.9 % (FLUSH) 0.9 %
10 SYRINGE (ML) INJECTION
Status: CANCELLED | OUTPATIENT
Start: 2024-04-24

## 2024-04-23 RX ORDER — DIPHENHYDRAMINE HYDROCHLORIDE 50 MG/ML
50 INJECTION INTRAMUSCULAR; INTRAVENOUS ONCE AS NEEDED
Status: CANCELLED | OUTPATIENT
Start: 2024-04-24

## 2024-04-23 RX ORDER — EPINEPHRINE 0.3 MG/.3ML
0.3 INJECTION SUBCUTANEOUS ONCE AS NEEDED
Status: CANCELLED | OUTPATIENT
Start: 2024-04-24

## 2024-04-23 NOTE — PROGRESS NOTES
Subjective:       Patient ID: Ruben Clements is a 39 y.o. female.    Chief Complaint: Follow-up (Patient reports heart burn )      Medical oncologist in [place]:  Jonas Velazquez MD  GYN/ONC:  Darryn Pires MD     Diagnosis:  pT2b, N0, M0 stage IIB (due to cul-de-sac nodules) right ovarian cancer diagnosed 6/8/2021 with surgery (cul-de-sac nodules found during completion hysterectomy and salpingo-oophorectomy)  pT1a, N0, M0 stage IA endometrial cancer diagnosed 8/18/2021  Recurrence in the left abdominal sidewall diagnosed 01/25/2024 via biopsy at [facility].     **An addendum to the pathology report from 9/7/2021 internal 9/17/2021 states that the pathologist and the submitting physician's physician assistant had a discussion, and the possibility that the ovary is a metastatic lesion from the endometrium was discussed with a metastasis to the ovary being favored by the clinician.  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     Current Treatment:   Carboplatin and paclitaxel every 3 weeks started on 03/13/2024 (since recurrence was greater than a year since her last carbo/taxol dose). Treating in [place].    Treatment History:  Adjuvant carboplatin and paclitaxel given every 3 weeks for 6 cycles, started on 10/27/2021.  Completed on 2/16/2022.      HPI:  Patient who was having irregular menses, pelvic pain, abdominal pain along with nausea and vomiting.  She presented to see her OB/GYN, imaging was done that showed ovarian cyst.  She underwent a right oophorectomy on 6/8/2021, pathology revealed a 15 cm well-differentiated endometrioid adenocarcinoma involving the right ovary, right fallopian tube was uninvolved.  She was then referred to Dr. Darryn Pires with GYN oncology in [place], [place].  On 7/16/2021, she underwent Terra hereditary cancer test, this was negative for pathogenic mutations.  Her [facility] breast cancer risk  assessment was 13.9% (general population was 13.2%, no significant increased risk compared to general population).  She underwent an endometrial biopsy on 8/18/2021 that revealed FIGO grade 1 endometrioid endometrial adenocarcinoma.  She then underwent a robotic hysterectomy, left oophorectomy, bilateral pelvic sentinel node dissection with partial omentectomy and peritoneal biopsies of cul-de-sac nodules.  Pathology revealed endometrioid endometrial adenocarcinoma, FIGO 1 with less than 50% invasion.  Margins were negative, lymph nodes were negative, cul-de-sac nodules were positive for metastatic endometrial carcinoma.  Originally, it was discussed that the cul-de-sac lesions were from her ovarian cancer, this would stage her right ovarian cancer as a pT2b, N0, M0 stage IIB and would stage her endometrial cancer as a pT1a, N0, M0 stage IA.  Per an addendum on 9/17/2021, further discussion suggested a pT3a, N0, M0 stage IIIA endometrial cancer.  Patient was seen by Dr. Velazquez on 9/23/2021, plan was to treat with adjuvant carbo Taxol for 6 cycles.  Due to the fact that the patient lives in [place], [place], it was decided to refer to an oncologist in [place], [place] as this was closer to home.  I initially saw the patient on 10/18/2021 at that visit, she stated to overall be feeling well.  She did have some numbness and tingling, also some depression and anxiety, but had no other major issues to discuss.  Patient started her treatment on 10/27/2021, completed on 2/16/2022.  Surveillance CT scans of the chest, abdomen, and pelvis started on 4/11/2022, these showed no evidence of disease. Most recently done on 08/14/2023 showing interim increase in size of the small nodule at the right suprahilar region, previously measuring 3-4 mm, now measuring 6-7 mm. CT scan of the chest, abdomen, and pelvis done on 11/22/2023 Enlarging right apical lung nodule now measuring 1.1 x 1.0 cm.  There was a new 1.0 cm soft tissue  nodule in the fat and musculature of the left lateral lower abdominal wall.    PET/CT scan done on 12/06/2023 showed hypermetabolic 1.0 cm solid right upper lobe nodule with 2 hypermetabolic lymph nodes, 1 in the right axilla and 1 in the portacaval system.  There was a 1.1 cm nodule in the left lower anterior abdominal wall that was also hypermetabolic.    Right axillary lymph node done on 12/11/2023 showed benign findings with no evidence of malignancy.  There was still concern that malignancy was present, patient ended up at [facility].    Left lower abdominal wall nodule biopsy done on 01/25/2024 at [facility] revealed fragments of low-grade endometrioid adenocarcinoma.    She then underwent lung biopsy of the right upper lobe lung nodule at [facility] on 02/20/2024, this returned positive for metastatic endometrial carcinoma.    Started carboplatin and paclitaxel on 03/13/2024.    Interval History:   Patient presents to clinic for scheduled follow up appointment. She started treatment for recurrent malignancy on 03/13/2024.  She is feeling well overall.   Her main complaint is reflux. She is staring to be able to identify triggers to avoid.  She denies any particular nausea or bowel complaints. Some numbness in her feet only for a few days following treatment. She is having fatigue and SOB on exertion. No swelling. She is also having a cough that can be bothersome at night.      Past Medical History:   Diagnosis Date    CVA (cerebral vascular accident)     Endometrial cancer 08/18/2021    Malignant neoplasm of right ovary 06/08/2021      Past Surgical History:   Procedure Laterality Date    ADENOIDECTOMY      CHOLECYSTECTOMY      HYSTERECTOMY      SALPINGOOPHORECTOMY      TONSILLECTOMY       Social History     Socioeconomic History    Marital status: Single   Tobacco Use    Smoking status: Never    Smokeless tobacco: Never   Substance and Sexual Activity    Alcohol use: Not Currently     Comment: 1-2 times  per month    Drug use: Never    Sexual activity: Not Currently     Birth control/protection: Abstinence      Family History   Problem Relation Name Age of Onset    Diabetes Mother Aida Clements     Hypertension Mother Aida Clements     Depression Mother Aida Clements     Depression Father Cristobal Clements     Diabetes Father Cristobal Clements     Early death Father Cristobal Clements         Heart failure    Stroke Father Cristobal Clements     Depression Sister Radha Clements-Quan     Depression Sister Haily Clements     Diabetes Sister Haily Clements     COPD Maternal Grandmother Sudha Rojas         CHF and COPD    Colon cancer Maternal Grandfather Denilson Pierpont 60    Prostate cancer Maternal Grandfather Denilson Pierpont 60    Stomach cancer Maternal Great-Grandmother        Review of patient's allergies indicates:  No Known Allergies   Review of Systems   Constitutional:  Negative for appetite change and unexpected weight change.   HENT:  Negative for mouth sores.    Eyes:  Negative for visual disturbance.   Respiratory:  Positive for cough and shortness of breath.    Cardiovascular:  Negative for chest pain.   Gastrointestinal:  Negative for abdominal pain and diarrhea.   Genitourinary:  Negative for frequency.   Musculoskeletal:  Negative for back pain.   Integumentary:  Negative for rash.   Neurological:  Positive for headaches.   Hematological:  Negative for adenopathy.   Psychiatric/Behavioral:  The patient is not nervous/anxious.          Objective:      Physical Exam  Vitals reviewed. Exam conducted with a chaperone present.   Constitutional:       General: She is not in acute distress.     Appearance: Normal appearance.   HENT:      Head: Normocephalic and atraumatic.      Nose: Nose normal.      Mouth/Throat:      Mouth: Mucous membranes are moist.   Eyes:      Extraocular Movements: Extraocular movements intact.      Conjunctiva/sclera: Conjunctivae normal.   Cardiovascular:      Rate and Rhythm: Normal rate  and regular rhythm.      Pulses: Normal pulses.      Heart sounds: Normal heart sounds.   Pulmonary:      Effort: Pulmonary effort is normal.      Breath sounds: Normal breath sounds.   Abdominal:      General: Bowel sounds are normal.      Palpations: Abdomen is soft.   Musculoskeletal:         General: No swelling. Normal range of motion.      Cervical back: Normal range of motion and neck supple.      Right lower leg: No edema.      Left lower leg: No edema.   Lymphadenopathy:      Cervical: No cervical adenopathy.   Skin:     General: Skin is warm and dry.   Neurological:      General: No focal deficit present.      Mental Status: She is alert and oriented to person, place, and time. Mental status is at baseline.   Psychiatric:         Mood and Affect: Mood normal.         Behavior: Behavior normal.         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/2021 with surgery (cul-de-sac nodules found during completion hysterectomy and salpingo-oophorectomy)  pT1a, N0, M0 stage IA endometrial cancer diagnosed 8/18/2021   Recurrent endometrial cancer of a left abdominal wall mass and right lung as of January 2024 in February 2024 respectively     **An addendum to the pathology report from 9/7/2021 internal 9/17/2021 states that the pathologist and the submitting physician's physician assistant had a discussion, and the possibility that the ovary is a metastatic lesion from the endometrium was discussed with a metastasis to the ovary being favored by the clinician.  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        Plan:     Patient completed 6 cycles of adjuvant carboplatin and paclitaxel on 02/16/2022.     CT scan on 11/22/2023 showed increasing size of the right apical nodule, now measuring 1.0 x 1.1 cm.  There was also a new 1.0 cm nodule in the left  abdominal wall.    PET/CT scan done on 12/06/2023 shows hypermetabolic right lung nodule, left abdominal wall nodule, right axillary lymph node and portacaval lymph node.    Right axillary lymph node done on 12/11/2023 benign    Left lower abdominal wall nodule biopsy done on 01/25/2024 at [facility] showed recurrent endometrial cancer.    The lung lesion was positive on 02/20/2024 for metastatic endometrial carcinoma.  The patient discussed with their gyn Oncology team possible treatment options, they recommended systemic therapy with carboplatin and paclitaxel.  The patient would like to treat with systemic therapy.    Continue carboplatin and paclitaxel-- Started on 03/13/2024.     Proceed with cycle 3 tomorrow as scheduled in [place]     Protonix and Pepcid for heartburn, tums PRN    Promethazine DM at night for cough    Return to clinic for TD visit with labs    Labs: CBC, CMP, mag, UPT, and      All questions were answered to the best of my ability and she understands the plan moving forward.      SILVIA Raymundo

## 2024-04-24 ENCOUNTER — INFUSION (OUTPATIENT)
Dept: INFUSION THERAPY | Facility: HOSPITAL | Age: 39
End: 2024-04-24
Attending: FAMILY MEDICINE
Payer: COMMERCIAL

## 2024-04-24 DIAGNOSIS — C54.1 ENDOMETRIAL CANCER: Primary | ICD-10-CM

## 2024-04-24 PROCEDURE — 96376 TX/PRO/DX INJ SAME DRUG ADON: CPT

## 2024-04-24 PROCEDURE — 25000003 PHARM REV CODE 250: Performed by: NURSE PRACTITIONER

## 2024-04-24 PROCEDURE — 25000003 PHARM REV CODE 250

## 2024-04-24 PROCEDURE — 96367 TX/PROPH/DG ADDL SEQ IV INF: CPT

## 2024-04-24 PROCEDURE — 96411 CHEMO IV PUSH ADDL DRUG: CPT

## 2024-04-24 PROCEDURE — 63600175 PHARM REV CODE 636 W HCPCS

## 2024-04-24 PROCEDURE — 96375 TX/PRO/DX INJ NEW DRUG ADDON: CPT

## 2024-04-24 PROCEDURE — 96417 CHEMO IV INFUS EACH ADDL SEQ: CPT

## 2024-04-24 PROCEDURE — 63600175 PHARM REV CODE 636 W HCPCS: Performed by: NURSE PRACTITIONER

## 2024-04-24 PROCEDURE — 96415 CHEMO IV INFUSION ADDL HR: CPT

## 2024-04-24 PROCEDURE — A4216 STERILE WATER/SALINE, 10 ML: HCPCS | Performed by: NURSE PRACTITIONER

## 2024-04-24 PROCEDURE — 96413 CHEMO IV INFUSION 1 HR: CPT

## 2024-04-24 RX ORDER — DIPHENHYDRAMINE HYDROCHLORIDE 50 MG/ML
50 INJECTION INTRAMUSCULAR; INTRAVENOUS ONCE
Status: COMPLETED | OUTPATIENT
Start: 2024-04-24 | End: 2024-04-24

## 2024-04-24 RX ORDER — SODIUM CHLORIDE 0.9 % (FLUSH) 0.9 %
10 SYRINGE (ML) INJECTION
Status: DISCONTINUED | OUTPATIENT
Start: 2024-04-24 | End: 2024-04-25 | Stop reason: HOSPADM

## 2024-04-24 RX ORDER — HEPARIN 100 UNIT/ML
500 SYRINGE INTRAVENOUS
Status: DISCONTINUED | OUTPATIENT
Start: 2024-04-24 | End: 2024-04-25 | Stop reason: HOSPADM

## 2024-04-24 RX ORDER — PROCHLORPERAZINE EDISYLATE 5 MG/ML
10 INJECTION INTRAMUSCULAR; INTRAVENOUS ONCE AS NEEDED
Status: DISCONTINUED | OUTPATIENT
Start: 2024-04-24 | End: 2024-04-25 | Stop reason: HOSPADM

## 2024-04-24 RX ORDER — PROCHLORPERAZINE EDISYLATE 5 MG/ML
10 INJECTION INTRAMUSCULAR; INTRAVENOUS ONCE AS NEEDED
Status: DISCONTINUED | OUTPATIENT
Start: 2024-04-03 | End: 2024-04-25 | Stop reason: HOSPADM

## 2024-04-24 RX ORDER — DIPHENHYDRAMINE HYDROCHLORIDE 50 MG/ML
50 INJECTION INTRAMUSCULAR; INTRAVENOUS ONCE AS NEEDED
Status: DISCONTINUED | OUTPATIENT
Start: 2024-04-24 | End: 2024-04-25 | Stop reason: HOSPADM

## 2024-04-24 RX ORDER — ONDANSETRON HYDROCHLORIDE 2 MG/ML
8 INJECTION, SOLUTION INTRAVENOUS ONCE
Status: COMPLETED | OUTPATIENT
Start: 2024-04-24 | End: 2024-04-24

## 2024-04-24 RX ORDER — METHYLPREDNISOLONE SOD SUCC 125 MG
62.5 VIAL (EA) INJECTION ONCE
Status: COMPLETED | OUTPATIENT
Start: 2024-04-24 | End: 2024-04-24

## 2024-04-24 RX ORDER — EPINEPHRINE 0.3 MG/.3ML
0.3 INJECTION SUBCUTANEOUS ONCE AS NEEDED
Status: DISCONTINUED | OUTPATIENT
Start: 2024-04-24 | End: 2024-04-25 | Stop reason: HOSPADM

## 2024-04-24 RX ORDER — FAMOTIDINE 10 MG/ML
20 INJECTION INTRAVENOUS ONCE
Status: COMPLETED | OUTPATIENT
Start: 2024-04-24 | End: 2024-04-24

## 2024-04-24 RX ORDER — FAMOTIDINE 10 MG/ML
20 INJECTION INTRAVENOUS
Status: COMPLETED | OUTPATIENT
Start: 2024-04-24 | End: 2024-04-24

## 2024-04-24 RX ADMIN — ONDANSETRON 8 MG: 2 INJECTION INTRAMUSCULAR; INTRAVENOUS at 12:04

## 2024-04-24 RX ADMIN — DIPHENHYDRAMINE HYDROCHLORIDE 50 MG: 50 INJECTION, SOLUTION INTRAMUSCULAR; INTRAVENOUS at 08:04

## 2024-04-24 RX ADMIN — APREPITANT 130 MG: 130 INJECTION, EMULSION INTRAVENOUS at 08:04

## 2024-04-24 RX ADMIN — METHYLPREDNISOLONE SODIUM SUCCINATE 40 MG: 40 INJECTION, POWDER, FOR SOLUTION INTRAMUSCULAR; INTRAVENOUS at 12:04

## 2024-04-24 RX ADMIN — DEXAMETHASONE SODIUM PHOSPHATE 0.25 MG: 10 INJECTION, SOLUTION INTRAMUSCULAR; INTRAVENOUS at 08:04

## 2024-04-24 RX ADMIN — Medication 10 ML: at 01:04

## 2024-04-24 RX ADMIN — Medication 500 UNITS: at 01:04

## 2024-04-24 RX ADMIN — FAMOTIDINE 20 MG: 10 INJECTION INTRAVENOUS at 09:04

## 2024-04-24 RX ADMIN — FAMOTIDINE 20 MG: 10 INJECTION INTRAVENOUS at 12:04

## 2024-04-24 RX ADMIN — DIPHENHYDRAMINE HYDROCHLORIDE 25 MG: 50 INJECTION, SOLUTION INTRAMUSCULAR; INTRAVENOUS at 12:04

## 2024-04-24 RX ADMIN — CARBOPLATIN 900 MG: 10 INJECTION, SOLUTION INTRAVENOUS at 12:04

## 2024-04-24 RX ADMIN — PACLITAXEL 384 MG: 6 INJECTION, SOLUTION INTRAVENOUS at 09:04

## 2024-04-24 RX ADMIN — METHYLPREDNISOLONE SODIUM SUCCINATE 62.5 MG: 125 INJECTION, POWDER, FOR SOLUTION INTRAMUSCULAR; INTRAVENOUS at 12:04

## 2024-04-25 VITALS
SYSTOLIC BLOOD PRESSURE: 121 MMHG | DIASTOLIC BLOOD PRESSURE: 77 MMHG | BODY MASS INDEX: 45.56 KG/M2 | HEIGHT: 62 IN | HEART RATE: 89 BPM | OXYGEN SATURATION: 98 % | WEIGHT: 247.56 LBS | TEMPERATURE: 98 F

## 2024-05-06 DIAGNOSIS — C54.1 ENDOMETRIAL CANCER: ICD-10-CM

## 2024-05-06 RX ORDER — DEXAMETHASONE 4 MG/1
TABLET ORAL
Qty: 6 TABLET | Refills: 3 | Status: SHIPPED | OUTPATIENT
Start: 2024-05-06

## 2024-05-14 ENCOUNTER — OFFICE VISIT (OUTPATIENT)
Dept: HEMATOLOGY/ONCOLOGY | Facility: CLINIC | Age: 39
End: 2024-05-14
Payer: COMMERCIAL

## 2024-05-14 ENCOUNTER — LAB VISIT (OUTPATIENT)
Dept: LAB | Facility: HOSPITAL | Age: 39
End: 2024-05-14
Attending: INTERNAL MEDICINE
Payer: COMMERCIAL

## 2024-05-14 VITALS
OXYGEN SATURATION: 96 % | DIASTOLIC BLOOD PRESSURE: 73 MMHG | RESPIRATION RATE: 16 BRPM | HEART RATE: 102 BPM | HEIGHT: 62 IN | WEIGHT: 253.44 LBS | SYSTOLIC BLOOD PRESSURE: 105 MMHG | BODY MASS INDEX: 46.64 KG/M2

## 2024-05-14 DIAGNOSIS — Z79.899 ON ANTINEOPLASTIC CHEMOTHERAPY: ICD-10-CM

## 2024-05-14 DIAGNOSIS — C56.1 MALIGNANT NEOPLASM OF RIGHT OVARY: ICD-10-CM

## 2024-05-14 DIAGNOSIS — K21.9 GASTROESOPHAGEAL REFLUX DISEASE, UNSPECIFIED WHETHER ESOPHAGITIS PRESENT: ICD-10-CM

## 2024-05-14 DIAGNOSIS — C78.00 MALIGNANT NEOPLASM METASTATIC TO LUNG, UNSPECIFIED LATERALITY: ICD-10-CM

## 2024-05-14 DIAGNOSIS — C54.1 ENDOMETRIAL CANCER: ICD-10-CM

## 2024-05-14 DIAGNOSIS — T50.905A ADVERSE EFFECT OF DRUG, INITIAL ENCOUNTER: ICD-10-CM

## 2024-05-14 DIAGNOSIS — C54.1 ENDOMETRIAL CANCER: Primary | ICD-10-CM

## 2024-05-14 LAB
ALBUMIN SERPL-MCNC: 3.8 G/DL (ref 3.5–5)
ALBUMIN/GLOB SERPL: 1.2 RATIO (ref 1.1–2)
ALP SERPL-CCNC: 99 UNIT/L (ref 40–150)
ALT SERPL-CCNC: 44 UNIT/L (ref 0–55)
AST SERPL-CCNC: 25 UNIT/L (ref 5–34)
B-HCG UR QL: NEGATIVE
BASOPHILS # BLD AUTO: 0.03 X10(3)/MCL
BASOPHILS NFR BLD AUTO: 0.4 %
BILIRUB SERPL-MCNC: 0.3 MG/DL
BUN SERPL-MCNC: 10.8 MG/DL (ref 7–18.7)
CALCIUM SERPL-MCNC: 9.6 MG/DL (ref 8.4–10.2)
CANCER AG125 SERPL-ACNC: 11.3 UNIT/ML (ref 0–35)
CHLORIDE SERPL-SCNC: 106 MMOL/L (ref 98–107)
CO2 SERPL-SCNC: 29 MMOL/L (ref 22–29)
CREAT SERPL-MCNC: 0.64 MG/DL (ref 0.55–1.02)
EOSINOPHIL # BLD AUTO: 0.05 X10(3)/MCL (ref 0–0.9)
EOSINOPHIL NFR BLD AUTO: 0.6 %
ERYTHROCYTE [DISTWIDTH] IN BLOOD BY AUTOMATED COUNT: 14.4 % (ref 11.5–17)
GFR SERPLBLD CREATININE-BSD FMLA CKD-EPI: >60 ML/MIN/1.73/M2
GLOBULIN SER-MCNC: 3.2 GM/DL (ref 2.4–3.5)
GLUCOSE SERPL-MCNC: 97 MG/DL (ref 74–100)
HCT VFR BLD AUTO: 36.3 % (ref 37–47)
HGB BLD-MCNC: 11.9 G/DL (ref 12–16)
IMM GRANULOCYTES # BLD AUTO: 0.12 X10(3)/MCL (ref 0–0.04)
IMM GRANULOCYTES NFR BLD AUTO: 1.6 %
LYMPHOCYTES # BLD AUTO: 1.79 X10(3)/MCL (ref 0.6–4.6)
LYMPHOCYTES NFR BLD AUTO: 23.2 %
MAGNESIUM SERPL-MCNC: 2 MG/DL (ref 1.6–2.6)
MCH RBC QN AUTO: 30.8 PG (ref 27–31)
MCHC RBC AUTO-ENTMCNC: 32.8 G/DL (ref 33–36)
MCV RBC AUTO: 94 FL (ref 80–94)
MONOCYTES # BLD AUTO: 0.55 X10(3)/MCL (ref 0.1–1.3)
MONOCYTES NFR BLD AUTO: 7.1 %
NEUTROPHILS # BLD AUTO: 5.17 X10(3)/MCL (ref 2.1–9.2)
NEUTROPHILS NFR BLD AUTO: 67.1 %
PLATELET # BLD AUTO: 352 X10(3)/MCL (ref 130–400)
PMV BLD AUTO: 9 FL (ref 7.4–10.4)
POTASSIUM SERPL-SCNC: 4 MMOL/L (ref 3.5–5.1)
PROT SERPL-MCNC: 7 GM/DL (ref 6.4–8.3)
RBC # BLD AUTO: 3.86 X10(6)/MCL (ref 4.2–5.4)
SODIUM SERPL-SCNC: 143 MMOL/L (ref 136–145)
WBC # SPEC AUTO: 7.71 X10(3)/MCL (ref 4.5–11.5)

## 2024-05-14 PROCEDURE — 81025 URINE PREGNANCY TEST: CPT

## 2024-05-14 PROCEDURE — 99999 PR PBB SHADOW E&M-EST. PATIENT-LVL V: CPT | Mod: PBBFAC,,, | Performed by: NURSE PRACTITIONER

## 2024-05-14 PROCEDURE — 36415 COLL VENOUS BLD VENIPUNCTURE: CPT

## 2024-05-14 PROCEDURE — 1159F MED LIST DOCD IN RCRD: CPT | Mod: CPTII,S$GLB,, | Performed by: NURSE PRACTITIONER

## 2024-05-14 PROCEDURE — 3074F SYST BP LT 130 MM HG: CPT | Mod: CPTII,S$GLB,, | Performed by: NURSE PRACTITIONER

## 2024-05-14 PROCEDURE — 1160F RVW MEDS BY RX/DR IN RCRD: CPT | Mod: CPTII,S$GLB,, | Performed by: NURSE PRACTITIONER

## 2024-05-14 PROCEDURE — 3008F BODY MASS INDEX DOCD: CPT | Mod: CPTII,S$GLB,, | Performed by: NURSE PRACTITIONER

## 2024-05-14 PROCEDURE — 83735 ASSAY OF MAGNESIUM: CPT

## 2024-05-14 PROCEDURE — 86304 IMMUNOASSAY TUMOR CA 125: CPT

## 2024-05-14 PROCEDURE — 80053 COMPREHEN METABOLIC PANEL: CPT

## 2024-05-14 PROCEDURE — 85025 COMPLETE CBC W/AUTO DIFF WBC: CPT

## 2024-05-14 PROCEDURE — 3078F DIAST BP <80 MM HG: CPT | Mod: CPTII,S$GLB,, | Performed by: NURSE PRACTITIONER

## 2024-05-14 PROCEDURE — 99215 OFFICE O/P EST HI 40 MIN: CPT | Mod: S$GLB,,, | Performed by: NURSE PRACTITIONER

## 2024-05-14 RX ORDER — DIPHENHYDRAMINE HYDROCHLORIDE 50 MG/ML
25 INJECTION INTRAMUSCULAR; INTRAVENOUS
Status: CANCELLED
Start: 2024-05-15

## 2024-05-14 RX ORDER — PROMETHAZINE HYDROCHLORIDE AND DEXTROMETHORPHAN HYDROBROMIDE 6.25; 15 MG/5ML; MG/5ML
SYRUP ORAL
COMMUNITY
Start: 2024-04-23

## 2024-05-14 RX ORDER — DIPHENHYDRAMINE HYDROCHLORIDE 50 MG/ML
50 INJECTION INTRAMUSCULAR; INTRAVENOUS ONCE AS NEEDED
Status: CANCELLED | OUTPATIENT
Start: 2024-05-15

## 2024-05-14 RX ORDER — FAMOTIDINE 20 MG/1
20 TABLET, FILM COATED ORAL
COMMUNITY

## 2024-05-14 RX ORDER — SODIUM CHLORIDE 0.9 % (FLUSH) 0.9 %
10 SYRINGE (ML) INJECTION
Status: CANCELLED | OUTPATIENT
Start: 2024-05-15

## 2024-05-14 RX ORDER — PROCHLORPERAZINE EDISYLATE 5 MG/ML
10 INJECTION INTRAMUSCULAR; INTRAVENOUS ONCE AS NEEDED
Status: CANCELLED | OUTPATIENT
Start: 2024-05-15

## 2024-05-14 RX ORDER — FAMOTIDINE 10 MG/ML
20 INJECTION INTRAVENOUS
Status: CANCELLED
Start: 2024-05-15

## 2024-05-14 RX ORDER — HEPARIN 100 UNIT/ML
500 SYRINGE INTRAVENOUS
Status: CANCELLED | OUTPATIENT
Start: 2024-05-15

## 2024-05-14 RX ORDER — MONTELUKAST SODIUM 10 MG/1
10 TABLET ORAL DAILY
Status: CANCELLED
Start: 2024-05-15

## 2024-05-14 RX ORDER — FAMOTIDINE 10 MG/ML
20 INJECTION INTRAVENOUS
Status: CANCELLED | OUTPATIENT
Start: 2024-05-15

## 2024-05-14 RX ORDER — EPINEPHRINE 0.3 MG/.3ML
0.3 INJECTION SUBCUTANEOUS ONCE AS NEEDED
Status: CANCELLED | OUTPATIENT
Start: 2024-05-15

## 2024-05-14 NOTE — PROGRESS NOTES
Subjective:       Patient ID: Ruben Clements is a 39 y.o. female.    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     **An addendum to the pathology report from 9/7/2021 internal 9/17/2021 states that the pathologist and the submitting physician's physician assistant had a discussion, and the possibility that the ovary is a metastatic lesion from the endometrium was discussed with a metastasis to the ovary being favored by the clinician.  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     Current Treatment:   Carboplatin and paclitaxel every 3 weeks started on 03/13/2024 (since recurrence was greater than a year since her last carbo/taxol dose). Treating in [place].    Treatment History:  Adjuvant carboplatin and paclitaxel given every 3 weeks for 6 cycles, started on 10/27/2021.  Completed on 2/16/2022.      HPI:  Patient who was having irregular menses, pelvic pain, abdominal pain along with nausea and vomiting.  She presented to see her OB/GYN, imaging was done that showed ovarian cyst.  She underwent a right oophorectomy on 6/8/2021, pathology revealed a 15 cm well-differentiated endometrioid adenocarcinoma involving the right ovary, right fallopian tube was uninvolved.  She was then referred to Dr. Darryn Pires with GYN oncology in [place], [place].  On 7/16/2021, she underwent [facility] hereditary cancer test, this was negative for pathogenic mutations.  Her [facility] breast cancer risk  assessment was 13.9% (general population was 13.2%, no significant increased risk compared to general population).  She underwent an endometrial biopsy on 8/18/2021 that revealed FIGO grade 1 endometrioid endometrial adenocarcinoma.  She then underwent a robotic hysterectomy, left oophorectomy, bilateral pelvic sentinel node dissection with partial omentectomy and peritoneal biopsies of cul-de-sac nodules.  Pathology revealed endometrioid endometrial adenocarcinoma, FIGO 1 with less than 50% invasion.  Margins were negative, lymph nodes were negative, cul-de-sac nodules were positive for metastatic endometrial carcinoma.  Originally, it was discussed that the cul-de-sac lesions were from her ovarian cancer, this would stage her right ovarian cancer as a pT2b, N0, M0 stage IIB and would stage her endometrial cancer as a pT1a, N0, M0 stage IA.  Per an addendum on 9/17/2021, further discussion suggested a pT3a, N0, M0 stage IIIA endometrial cancer.  Patient was seen by Dr. Velazquez on 9/23/2021, plan was to treat with adjuvant carbo Taxol for 6 cycles.  Due to the fact that the patient lives in [place], [place], it was decided to refer to an oncologist in [place], [place] as this was closer to home.  I initially saw the patient on 10/18/2021 at that visit, she stated to overall be feeling well.  She did have some numbness and tingling, also some depression and anxiety, but had no other major issues to discuss.  Patient started her treatment on 10/27/2021, completed on 2/16/2022.  Surveillance CT scans of the chest, abdomen, and pelvis started on 4/11/2022, these showed no evidence of disease. Most recently done on 08/14/2023 showing interim increase in size of the small nodule at the right suprahilar region, previously measuring 3-4 mm, now measuring 6-7 mm. CT scan of the chest, abdomen, and pelvis done on 11/22/2023 Enlarging right apical lung nodule now measuring 1.1 x 1.0 cm.  There was a new 1.0 cm soft tissue  nodule in the fat and musculature of the left lateral lower abdominal wall.    PET/CT scan done on 12/06/2023 showed hypermetabolic 1.0 cm solid right upper lobe nodule with 2 hypermetabolic lymph nodes, 1 in the right axilla and 1 in the portacaval system.  There was a 1.1 cm nodule in the left lower anterior abdominal wall that was also hypermetabolic.    Right axillary lymph node done on 12/11/2023 showed benign findings with no evidence of malignancy.  There was still concern that malignancy was present, patient ended up at [facility].    Left lower abdominal wall nodule biopsy done on 01/25/2024 at [facility] revealed fragments of low-grade endometrioid adenocarcinoma.    She then underwent lung biopsy of the right upper lobe lung nodule at [facility] on 02/20/2024, this returned positive for metastatic endometrial carcinoma.    Started carboplatin and paclitaxel on 03/13/2024.    Interval History:   Patient presents to clinic for scheduled follow up appointment. She started treatment for recurrent malignancy on 03/13/2024.  She is feeling well overall.   She did have a significant reaction to carboplatin with her last cycle.  Her reaction included itching, swollen gums, throat tightening and shortness of breath.  Some numbness/tingling in her hands/feet but this remains intermittent. She is having fatigue and SOB on exertion. No swelling. She has  a cough that can be bothersome at night.      Past Medical History:   Diagnosis Date    CVA (cerebral vascular accident)     Endometrial cancer 08/18/2021    Malignant neoplasm of right ovary 06/08/2021      Past Surgical History:   Procedure Laterality Date    ADENOIDECTOMY      CHOLECYSTECTOMY      HYSTERECTOMY      SALPINGOOPHORECTOMY      TONSILLECTOMY       Social History     Socioeconomic History    Marital status: Single   Tobacco Use    Smoking status: Never    Smokeless tobacco: Never   Substance and Sexual Activity    Alcohol use: Not Currently      Comment: 1-2 times per month    Drug use: Never    Sexual activity: Not Currently     Birth control/protection: Abstinence      Family History   Problem Relation Name Age of Onset    Diabetes Mother Aida Clements     Hypertension Mother Aida Clements     Depression Mother Aida Clements     Depression Father Cristobal Clements     Diabetes Father Cristobal Clements     Early death Father Cristobal Clements         Heart failure    Stroke Father Cristobal Clements     Depression Sister Radha Clements-Quan     Depression Sister Haily Clements     Diabetes Sister Haily Clements     COPD Maternal Grandmother Sudha Rojas         CHF and COPD    Colon cancer Maternal Grandfather Denilson Rojas 60    Prostate cancer Maternal Grandfather Denilson Rojas 60    Stomach cancer Maternal Great-Grandmother        Review of patient's allergies indicates:   Allergen Reactions    Carboplatin Itching, Palpitations and Shortness Of Breath      Review of Systems   Constitutional:  Negative for appetite change and unexpected weight change.   HENT:  Negative for mouth sores.    Eyes:  Negative for visual disturbance.   Respiratory:  Positive for shortness of breath. Negative for cough.    Cardiovascular:  Negative for chest pain.   Gastrointestinal:  Positive for abdominal pain. Negative for diarrhea.   Genitourinary:  Negative for frequency.   Musculoskeletal:  Negative for back pain.   Integumentary:  Negative for rash.   Neurological:  Negative for headaches.   Hematological:  Negative for adenopathy.   Psychiatric/Behavioral:  The patient is not nervous/anxious.          Objective:      Physical Exam  Vitals reviewed.   Constitutional:       General: She is not in acute distress.     Appearance: Normal appearance.   HENT:      Head: Normocephalic and atraumatic.      Nose: Nose normal.      Mouth/Throat:      Mouth: Mucous membranes are moist.   Eyes:      Extraocular Movements: Extraocular movements intact.      Conjunctiva/sclera:  Conjunctivae normal.   Cardiovascular:      Rate and Rhythm: Normal rate and regular rhythm.      Pulses: Normal pulses.      Heart sounds: Normal heart sounds.   Pulmonary:      Effort: Pulmonary effort is normal.      Breath sounds: Normal breath sounds.   Abdominal:      General: Bowel sounds are normal.      Palpations: Abdomen is soft.   Musculoskeletal:         General: No swelling. Normal range of motion.      Cervical back: Normal range of motion and neck supple.      Right lower leg: No edema.      Left lower leg: No edema.   Lymphadenopathy:      Cervical: No cervical adenopathy.   Skin:     General: Skin is warm and dry.   Neurological:      General: No focal deficit present.      Mental Status: She is alert and oriented to person, place, and time. Mental status is at baseline.   Psychiatric:         Mood and Affect: Mood normal.         Behavior: Behavior normal.         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/2021 with surgery (cul-de-sac nodules found during completion hysterectomy and salpingo-oophorectomy)  pT1a, N0, M0 stage IA endometrial cancer diagnosed 8/18/2021   Recurrent endometrial cancer of a left abdominal wall mass and right lung as of January 2024 in February 2024 respectively     **An addendum to the pathology report from 9/7/2021 internal 9/17/2021 states that the pathologist and the submitting physician's physician assistant had a discussion, and the possibility that the ovary is a metastatic lesion from the endometrium was discussed with a metastasis to the ovary being favored by the clinician.  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        Plan:     Patient completed 6 cycles of adjuvant carboplatin and paclitaxel on 02/16/2022.     CT scan on 11/22/2023 showed increasing size of the right apical nodule,  now measuring 1.0 x 1.1 cm.  There was also a new 1.0 cm nodule in the left abdominal wall.    PET/CT scan done on 12/06/2023 shows hypermetabolic right lung nodule, left abdominal wall nodule, right axillary lymph node and portacaval lymph node.    Right axillary lymph node done on 12/11/2023 benign    Left lower abdominal wall nodule biopsy done on 01/25/2024 at [facility] showed recurrent endometrial cancer.    The lung lesion was positive on 02/20/2024 for metastatic endometrial carcinoma.  The patient discussed with their gyn Oncology team possible treatment options, they recommended systemic therapy with carboplatin and paclitaxel.  The patient would like to treat with systemic therapy.    Recent CT scans at [facility] on 05/09/2024 showed interval decrease in the right upper lobe pulmonary metastasis which is now subcentimeter in size with no new disease seen in the chest, abdomen or pelvis.  There recommendation was to continue with carbo Taxol for 3 more cycles.    Continue carboplatin and paclitaxel-- Started on 03/13/2024.     Will need to desensitize her due to significant carbo reaction. She will treat in [place] for cycle 4 to do this.     Protonix and Pepcid for heartburn, tums PRN    Promethazine DM at night for cough    Return to clinic for TD visit with labs    Labs: CBC, CMP, mag, UPT, and      All questions were answered to the best of my ability and she understands the plan moving forward.      SILVIA Raymundo

## 2024-05-15 ENCOUNTER — INFUSION (OUTPATIENT)
Dept: INFUSION THERAPY | Facility: HOSPITAL | Age: 39
End: 2024-05-15
Attending: INTERNAL MEDICINE
Payer: COMMERCIAL

## 2024-05-15 VITALS
TEMPERATURE: 98 F | SYSTOLIC BLOOD PRESSURE: 130 MMHG | DIASTOLIC BLOOD PRESSURE: 70 MMHG | WEIGHT: 253 LBS | HEIGHT: 62 IN | RESPIRATION RATE: 16 BRPM | OXYGEN SATURATION: 96 % | HEART RATE: 94 BPM | BODY MASS INDEX: 46.56 KG/M2

## 2024-05-15 DIAGNOSIS — C54.1 ENDOMETRIAL CANCER: Primary | ICD-10-CM

## 2024-05-15 PROCEDURE — A4216 STERILE WATER/SALINE, 10 ML: HCPCS | Performed by: NURSE PRACTITIONER

## 2024-05-15 PROCEDURE — 96376 TX/PRO/DX INJ SAME DRUG ADON: CPT

## 2024-05-15 PROCEDURE — 96367 TX/PROPH/DG ADDL SEQ IV INF: CPT

## 2024-05-15 PROCEDURE — 96417 CHEMO IV INFUS EACH ADDL SEQ: CPT

## 2024-05-15 PROCEDURE — 96413 CHEMO IV INFUSION 1 HR: CPT

## 2024-05-15 PROCEDURE — 25000003 PHARM REV CODE 250: Performed by: NURSE PRACTITIONER

## 2024-05-15 PROCEDURE — 63600175 PHARM REV CODE 636 W HCPCS: Performed by: NURSE PRACTITIONER

## 2024-05-15 PROCEDURE — 96375 TX/PRO/DX INJ NEW DRUG ADDON: CPT

## 2024-05-15 PROCEDURE — 96415 CHEMO IV INFUSION ADDL HR: CPT

## 2024-05-15 RX ORDER — FAMOTIDINE 10 MG/ML
20 INJECTION INTRAVENOUS
Status: COMPLETED | OUTPATIENT
Start: 2024-05-15 | End: 2024-05-15

## 2024-05-15 RX ORDER — HEPARIN 100 UNIT/ML
500 SYRINGE INTRAVENOUS
Status: DISCONTINUED | OUTPATIENT
Start: 2024-05-15 | End: 2024-05-15 | Stop reason: HOSPADM

## 2024-05-15 RX ORDER — EPINEPHRINE 0.3 MG/.3ML
0.3 INJECTION SUBCUTANEOUS ONCE AS NEEDED
Status: DISCONTINUED | OUTPATIENT
Start: 2024-05-15 | End: 2024-05-15 | Stop reason: HOSPADM

## 2024-05-15 RX ORDER — DIPHENHYDRAMINE HYDROCHLORIDE 50 MG/ML
25 INJECTION INTRAMUSCULAR; INTRAVENOUS
Status: COMPLETED | OUTPATIENT
Start: 2024-05-15 | End: 2024-05-15

## 2024-05-15 RX ORDER — MONTELUKAST SODIUM 10 MG/1
10 TABLET ORAL
Status: COMPLETED | OUTPATIENT
Start: 2024-05-15 | End: 2024-05-15

## 2024-05-15 RX ORDER — DIPHENHYDRAMINE HYDROCHLORIDE 50 MG/ML
50 INJECTION INTRAMUSCULAR; INTRAVENOUS ONCE AS NEEDED
Status: COMPLETED | OUTPATIENT
Start: 2024-05-15 | End: 2024-05-15

## 2024-05-15 RX ORDER — PROCHLORPERAZINE EDISYLATE 5 MG/ML
10 INJECTION INTRAMUSCULAR; INTRAVENOUS ONCE AS NEEDED
Status: DISCONTINUED | OUTPATIENT
Start: 2024-05-15 | End: 2024-05-15 | Stop reason: HOSPADM

## 2024-05-15 RX ORDER — SODIUM CHLORIDE 0.9 % (FLUSH) 0.9 %
10 SYRINGE (ML) INJECTION
Status: DISCONTINUED | OUTPATIENT
Start: 2024-05-15 | End: 2024-05-15 | Stop reason: HOSPADM

## 2024-05-15 RX ADMIN — HEPARIN 500 UNITS: 100 SYRINGE at 05:05

## 2024-05-15 RX ADMIN — HYDROCORTISONE SODIUM SUCCINATE 100 MG: 100 INJECTION, POWDER, FOR SOLUTION INTRAMUSCULAR; INTRAVENOUS at 02:05

## 2024-05-15 RX ADMIN — DIPHENHYDRAMINE HYDROCHLORIDE 25 MG: 50 INJECTION INTRAMUSCULAR; INTRAVENOUS at 08:05

## 2024-05-15 RX ADMIN — CARBOPLATIN 10 MG: 10 INJECTION, SOLUTION INTRAVENOUS at 01:05

## 2024-05-15 RX ADMIN — SODIUM CHLORIDE: 9 INJECTION, SOLUTION INTRAVENOUS at 08:05

## 2024-05-15 RX ADMIN — CARBOPLATIN 90 MG: 10 INJECTION, SOLUTION INTRAVENOUS at 02:05

## 2024-05-15 RX ADMIN — CARBOPLATIN 800 MG: 10 INJECTION, SOLUTION INTRAVENOUS at 03:05

## 2024-05-15 RX ADMIN — DIPHENHYDRAMINE HYDROCHLORIDE 25 MG: 50 INJECTION INTRAMUSCULAR; INTRAVENOUS at 01:05

## 2024-05-15 RX ADMIN — FAMOTIDINE 20 MG: 10 INJECTION, SOLUTION INTRAVENOUS at 01:05

## 2024-05-15 RX ADMIN — PACLITAXEL 384 MG: 6 INJECTION, SOLUTION INTRAVENOUS at 09:05

## 2024-05-15 RX ADMIN — FAMOTIDINE 20 MG: 10 INJECTION, SOLUTION INTRAVENOUS at 09:05

## 2024-05-15 RX ADMIN — APREPITANT 130 MG: 130 INJECTION, EMULSION INTRAVENOUS at 12:05

## 2024-05-15 RX ADMIN — DIPHENHYDRAMINE HYDROCHLORIDE 25 MG: 50 INJECTION INTRAMUSCULAR; INTRAVENOUS at 02:05

## 2024-05-15 RX ADMIN — PALONOSETRON 0.25 MG: 0.25 INJECTION, SOLUTION INTRAVENOUS at 12:05

## 2024-05-15 RX ADMIN — DEXAMETHASONE SODIUM PHOSPHATE 10 MG: 4 INJECTION, SOLUTION INTRA-ARTICULAR; INTRALESIONAL; INTRAMUSCULAR; INTRAVENOUS; SOFT TISSUE at 08:05

## 2024-05-15 RX ADMIN — MONTELUKAST SODIUM 10 MG: 10 TABLET, COATED ORAL at 12:05

## 2024-05-15 RX ADMIN — Medication 10 ML: at 05:05

## 2024-05-15 RX ADMIN — CARBOPLATIN 5 MG: 10 INJECTION, SOLUTION INTRAVENOUS at 01:05

## 2024-05-15 NOTE — NURSING
When pt finished Bag 2, pt c/o of chest pain mid sternum scale 2-3 and slight itching to hand.  Call to [facility] and Dr. Raymundo, states to give 25 mg of benadryl and Solucortef 100 mg which was given at 1430.  At 1452 Carbo bag 3 started. Will continue to monitor

## 2024-05-15 NOTE — PLAN OF CARE
Pt arrived for C4 of Taxol, Carbo and 1st Carbo desent.  Assessment done, Port access with good blood return.  Information on Carbo desent explained to pt, states understanding.  Pt resting in chair with pillow and blanket for comfort.

## 2024-05-15 NOTE — PLAN OF CARE
Pt completed all infusions.  Port flushed and de accessed.  Pt discharged to home.  States will check portal for appts.

## 2024-05-18 ENCOUNTER — HOSPITAL ENCOUNTER (EMERGENCY)
Facility: HOSPITAL | Age: 39
Discharge: HOME | End: 2024-05-18
Attending: INTERNAL MEDICINE
Payer: COMMERCIAL

## 2024-05-18 VITALS
TEMPERATURE: 98 F | BODY MASS INDEX: 46.64 KG/M2 | OXYGEN SATURATION: 99 % | SYSTOLIC BLOOD PRESSURE: 169 MMHG | RESPIRATION RATE: 16 BRPM | DIASTOLIC BLOOD PRESSURE: 74 MMHG | HEART RATE: 93 BPM | WEIGHT: 255 LBS

## 2024-05-18 DIAGNOSIS — K59.00 CONSTIPATION: ICD-10-CM

## 2024-05-18 DIAGNOSIS — R11.2 NAUSEA AND VOMITING, UNSPECIFIED VOMITING TYPE: Primary | ICD-10-CM

## 2024-05-18 LAB
ALBUMIN SERPL-MCNC: 3.8 G/DL (ref 3.5–5)
ALBUMIN/GLOB SERPL: 1.2 RATIO (ref 1.1–2)
ALP SERPL-CCNC: 85 UNIT/L (ref 40–150)
ALT SERPL-CCNC: 39 UNIT/L (ref 0–55)
AMYLASE SERPL-CCNC: 29 UNIT/L (ref 25–125)
ANION GAP SERPL CALC-SCNC: 10 MEQ/L
AST SERPL-CCNC: 20 UNIT/L (ref 5–34)
BASOPHILS # BLD AUTO: 0.02 X10(3)/MCL
BASOPHILS NFR BLD AUTO: 0.2 %
BILIRUB SERPL-MCNC: 0.7 MG/DL
BILIRUB UR QL STRIP.AUTO: NEGATIVE
BUN SERPL-MCNC: 18 MG/DL (ref 7–18.7)
CALCIUM SERPL-MCNC: 9.4 MG/DL (ref 8.4–10.2)
CHLORIDE SERPL-SCNC: 104 MMOL/L (ref 98–107)
CLARITY UR: CLEAR
CO2 SERPL-SCNC: 25 MMOL/L (ref 22–29)
COLOR UR AUTO: YELLOW
CREAT SERPL-MCNC: 0.72 MG/DL (ref 0.55–1.02)
CREAT/UREA NIT SERPL: 25
EOSINOPHIL # BLD AUTO: 0.02 X10(3)/MCL (ref 0–0.9)
EOSINOPHIL NFR BLD AUTO: 0.2 %
ERYTHROCYTE [DISTWIDTH] IN BLOOD BY AUTOMATED COUNT: 14.2 % (ref 11.5–17)
GFR SERPLBLD CREATININE-BSD FMLA CKD-EPI: >60 ML/MIN/1.73/M2
GLOBULIN SER-MCNC: 3.3 GM/DL (ref 2.4–3.5)
GLUCOSE SERPL-MCNC: 185 MG/DL (ref 74–100)
GLUCOSE UR QL STRIP: NEGATIVE
HCT VFR BLD AUTO: 35.7 % (ref 37–47)
HGB BLD-MCNC: 12 G/DL (ref 12–16)
HGB UR QL STRIP: NEGATIVE
IMM GRANULOCYTES # BLD AUTO: 0.04 X10(3)/MCL (ref 0–0.04)
IMM GRANULOCYTES NFR BLD AUTO: 0.5 %
KETONES UR QL STRIP: NEGATIVE
LEUKOCYTE ESTERASE UR QL STRIP: NEGATIVE
LIPASE SERPL-CCNC: 15 U/L
LYMPHOCYTES # BLD AUTO: 0.62 X10(3)/MCL (ref 0.6–4.6)
LYMPHOCYTES NFR BLD AUTO: 7.1 %
MCH RBC QN AUTO: 30.9 PG (ref 27–31)
MCHC RBC AUTO-ENTMCNC: 33.6 G/DL (ref 33–36)
MCV RBC AUTO: 92 FL (ref 80–94)
MONOCYTES # BLD AUTO: 0.1 X10(3)/MCL (ref 0.1–1.3)
MONOCYTES NFR BLD AUTO: 1.1 %
NEUTROPHILS # BLD AUTO: 7.96 X10(3)/MCL (ref 2.1–9.2)
NEUTROPHILS NFR BLD AUTO: 90.9 %
NITRITE UR QL STRIP: NEGATIVE
PH UR STRIP: 6 [PH]
PLATELET # BLD AUTO: 293 X10(3)/MCL (ref 130–400)
PMV BLD AUTO: 9.6 FL (ref 7.4–10.4)
POTASSIUM SERPL-SCNC: 4.1 MMOL/L (ref 3.5–5.1)
PROT SERPL-MCNC: 7.1 GM/DL (ref 6.4–8.3)
PROT UR QL STRIP: NEGATIVE
RBC # BLD AUTO: 3.88 X10(6)/MCL (ref 4.2–5.4)
SODIUM SERPL-SCNC: 139 MMOL/L (ref 136–145)
SP GR UR STRIP.AUTO: 1.02 (ref 1–1.03)
UROBILINOGEN UR STRIP-ACNC: 0.2
WBC # SPEC AUTO: 8.76 X10(3)/MCL (ref 4.5–11.5)

## 2024-05-18 PROCEDURE — 82150 ASSAY OF AMYLASE: CPT | Performed by: NURSE PRACTITIONER

## 2024-05-18 PROCEDURE — 81003 URINALYSIS AUTO W/O SCOPE: CPT | Performed by: NURSE PRACTITIONER

## 2024-05-18 PROCEDURE — 96374 THER/PROPH/DIAG INJ IV PUSH: CPT

## 2024-05-18 PROCEDURE — 83690 ASSAY OF LIPASE: CPT | Performed by: NURSE PRACTITIONER

## 2024-05-18 PROCEDURE — 25000003 PHARM REV CODE 250: Performed by: NURSE PRACTITIONER

## 2024-05-18 PROCEDURE — 63600175 PHARM REV CODE 636 W HCPCS: Performed by: NURSE PRACTITIONER

## 2024-05-18 PROCEDURE — 96361 HYDRATE IV INFUSION ADD-ON: CPT

## 2024-05-18 PROCEDURE — 99284 EMERGENCY DEPT VISIT MOD MDM: CPT | Mod: 25

## 2024-05-18 PROCEDURE — 80053 COMPREHEN METABOLIC PANEL: CPT | Performed by: NURSE PRACTITIONER

## 2024-05-18 PROCEDURE — 85025 COMPLETE CBC W/AUTO DIFF WBC: CPT | Performed by: NURSE PRACTITIONER

## 2024-05-18 RX ORDER — LACTULOSE 10 G/15ML
20 SOLUTION ORAL 2 TIMES DAILY
Qty: 300 ML | Refills: 0 | Status: SHIPPED | OUTPATIENT
Start: 2024-05-18 | End: 2024-05-23

## 2024-05-18 RX ORDER — PROCHLORPERAZINE EDISYLATE 5 MG/ML
10 INJECTION INTRAMUSCULAR; INTRAVENOUS
Status: COMPLETED | OUTPATIENT
Start: 2024-05-18 | End: 2024-05-18

## 2024-05-18 RX ADMIN — PROCHLORPERAZINE EDISYLATE 10 MG: 5 INJECTION INTRAMUSCULAR; INTRAVENOUS at 02:05

## 2024-05-18 RX ADMIN — SODIUM CHLORIDE 1503 ML: 9 INJECTION, SOLUTION INTRAVENOUS at 02:05

## 2024-05-18 NOTE — ED PROVIDER NOTES
Encounter Date: 5/18/2024       History     Chief Complaint   Patient presents with    Vomiting     C/o nausea & vomiting this morning. Pt also had a near syncopal episode. Pt reports that she had chemo on Wednesday for lung Ca and hasn't had a BM since then also.     Patient is a 39-year-old female who has history of CVA, endometrial cancer who presents to the emergency department with nausea vomiting and fatigue.  States she had chemo Wednesday in his had these symptoms since.  She does report being on chronic pain meds for the history of cancer and nausea medication but not improving her symptoms at this time.  She also reports constipation that she contributes to the pain medication but normally she was able to give herself medication at home and clear herself out but has been unable to in the last several days.  She does report some generalized abdominal cramping but denies any localized pain in the abdomen.  She denies fevers chills.  She denies chest pain shortness of breath.  She denies any other complaints or associated symptoms at this time.      Review of patient's allergies indicates:   Allergen Reactions    Carboplatin Itching, Palpitations and Shortness Of Breath     Past Medical History:   Diagnosis Date    CVA (cerebral vascular accident)     Endometrial cancer 08/18/2021    Malignant neoplasm of right ovary 06/08/2021     Past Surgical History:   Procedure Laterality Date    ADENOIDECTOMY      CHOLECYSTECTOMY      HYSTERECTOMY      SALPINGOOPHORECTOMY      TONSILLECTOMY       Family History   Problem Relation Name Age of Onset    Diabetes Mother Aida Clements     Hypertension Mother Aida Clements     Depression Mother Aida Clements     Depression Father Cristobal Clements     Diabetes Father Cristobal Clements     Early death Father Cristobal Clements         Heart failure    Stroke Father Cristobal Yemi Clements     Depression Sister Radha Clements-Kadeem     Depression Sister Haily Tyree     Diabetes  Sister Haily Clements     COPD Maternal Grandmother Sudha Rojas         CHF and COPD    Colon cancer Maternal Grandfather Denilson Rojas 60    Prostate cancer Maternal Grandfather Denilson Rojas 60    Stomach cancer Maternal Great-Grandmother       Social History     Tobacco Use    Smoking status: Never    Smokeless tobacco: Never   Substance Use Topics    Alcohol use: Not Currently     Comment: 1-2 times per month    Drug use: Never     Review of Systems   Constitutional:  Positive for fatigue. Negative for activity change, appetite change and fever.   HENT:  Negative for congestion, dental problem and sore throat.    Respiratory:  Negative for apnea, chest tightness and shortness of breath.    Cardiovascular:  Negative for chest pain.   Gastrointestinal:  Positive for abdominal distention, abdominal pain, nausea and vomiting. Negative for diarrhea.   Genitourinary:  Negative for dysuria.   Musculoskeletal:  Negative for back pain, myalgias, neck pain and neck stiffness.   Skin:  Negative for rash.   Neurological:  Negative for dizziness, facial asymmetry and weakness.   Hematological:  Does not bruise/bleed easily.   Psychiatric/Behavioral:  Negative for agitation and behavioral problems.    All other systems reviewed and are negative.      Physical Exam     Initial Vitals [05/18/24 1351]   BP Pulse Resp Temp SpO2   (!) 101/58 63 18 98.2 °F (36.8 °C) 98 %      MAP       --         Physical Exam    Nursing note and vitals reviewed.  Constitutional: Vital signs are normal. She appears well-developed and well-nourished.  Non-toxic appearance. She does not have a sickly appearance.   HENT:   Head: Normocephalic and atraumatic.   Eyes: Conjunctivae, EOM and lids are normal. Lids are everted and swept, no foreign bodies found.   Neck: Trachea normal and phonation normal. Neck supple. No thyroid mass and no thyromegaly present.   Normal range of motion.   Full passive range of motion without pain.     Cardiovascular:  Normal  rate, regular rhythm, S1 normal, S2 normal, normal heart sounds, intact distal pulses and normal pulses.           Pulmonary/Chest: Breath sounds normal. No respiratory distress.   Abdominal: Abdomen is soft. She exhibits distension. There is abdominal tenderness.   Hyperactive bowel sounds generally.  Generalized abdominal discomfort but no point tenderness or localized pain. There is no rebound and no guarding.   Musculoskeletal:      Cervical back: Full passive range of motion without pain, normal range of motion and neck supple.     Lymphadenopathy:     She has no cervical adenopathy.   Neurological: She is alert and oriented to person, place, and time. She has normal strength.   Skin: Skin is warm, dry and intact. Capillary refill takes less than 2 seconds.   Psychiatric: She has a normal mood and affect. Her speech is normal and behavior is normal. Judgment normal. Cognition and memory are normal.         ED Course   Procedures  Labs Reviewed   COMPREHENSIVE METABOLIC PANEL - Abnormal; Notable for the following components:       Result Value    Glucose 185 (*)     All other components within normal limits   CBC WITH DIFFERENTIAL - Abnormal; Notable for the following components:    RBC 3.88 (*)     Hct 35.7 (*)     All other components within normal limits   AMYLASE - Normal   LIPASE - Normal   URINALYSIS, REFLEX TO URINE CULTURE - Normal   CBC W/ AUTO DIFFERENTIAL    Narrative:     The following orders were created for panel order CBC auto differential.  Procedure                               Abnormality         Status                     ---------                               -----------         ------                     CBC with Differential[[id number]]       Abnormal            Final result                 Please view results for these tests on the individual orders.          Imaging Results              X-Ray Abdomen AP 1 View (KUB) (Final result)  Result time 05/18/24 15:19:49      Final result by  Agus Prater MD (05/18/24 15:19:49)                   Impression:      Mild constipation is seen.      Electronically signed by: Agus Prater  Date:    05/18/2024  Time:    15:19               Narrative:    EXAMINATION:  XR ABDOMEN AP 1 VIEW    CLINICAL HISTORY:  Constipation, unspecified    TECHNIQUE:  AP View(s) of the abdomen was performed.    COMPARISON:  None    FINDINGS:  The bowel gas pattern appears unremarkable.  Mild constipation is seen.  No organomegaly is seen.  No pathologic appearing calcifications are identified.  The visualized osseous structures appear unremarkable.                                       Medications   sodium chloride 0.9% bolus 1,503 mL 1,503 mL ( Intravenous Stopped 5/18/24 1613)   prochlorperazine injection Soln 10 mg (10 mg Intravenous Given 5/18/24 1438)     Medical Decision Making  Patient is a 39-year-old female who has history of CVA, endometrial cancer who presents to the emergency department with nausea vomiting and fatigue.  States she had chemo Wednesday in his had these symptoms since.  She does report being on chronic pain meds for the history of cancer and nausea medication but not improving her symptoms at this time.  She also reports constipation that she contributes to the pain medication but normally she was able to give herself medication at home and clear herself out but has been unable to in the last several days.  She does report some generalized abdominal cramping but denies any localized pain in the abdomen.  She denies fevers chills.  She denies chest pain shortness of breath.  She denies any other complaints or associated symptoms at this time.    Problems Addressed:  Constipation: acute illness or injury     Details: Patient's symptoms resolved here in the ED. Blood pressure stabilized.  Patient feeling much better.  Will send home with lactulose for constipation.  Discussed continuation of Zofran at home for nausea.  Follow up with PCP  discussed.  Strict ED return precautions discussed for any change or worsening symptoms.  Patient had no other questions or concerns prior to discharge.    Amount and/or Complexity of Data Reviewed  Labs: ordered.  Radiology: ordered.    Risk  Prescription drug management.               ED Course as of 05/18/24 1637   Sat May 18, 2024   1547 Patient is doing significantly better.  Initially when she came in she appeared fatigued and almost drowsy and now she was sitting up on the side of the bed eyes wide open alert active stating she feels 100% better.  Only thing we are waiting on that this time is urine to rule out any infection in the bladder.  Plan at this time which was discussed with the patient is to discharge home with lactulose.  I explained her that I do not like to prescribe the Compazine because of side effects that are potential.  Patient was aware and will follow-up accordingly. [SL]      ED Course User Index  [SL] Graham Oliveira FNP                           Clinical Impression:  Final diagnoses:  [K59.00] Constipation  [R11.2] Nausea and vomiting, unspecified vomiting type (Primary)          ED Disposition Condition    Discharge Stable          ED Prescriptions       Medication Sig Dispense Start Date End Date Auth. Provider    lactulose (CHRONULAC) 20 gram/30 mL Soln Take 30 mLs (20 g total) by mouth 2 (two) times daily. for 5 days 300 mL 5/18/2024 5/23/2024 Graham Oliveira FNP          Follow-up Information       Follow up With Specialties Details Why Contact Info    Kaleb Barnett MD Family Medicine Schedule an appointment as soon as possible for a visit in 3 days For ER Follow Up., As needed [address] [place] [id number]  [phone]               Graham Oliveira FNP  05/18/24 9022

## 2024-05-20 ENCOUNTER — PATIENT MESSAGE (OUTPATIENT)
Dept: HEMATOLOGY/ONCOLOGY | Facility: CLINIC | Age: 39
End: 2024-05-20
Payer: COMMERCIAL

## 2024-06-02 DIAGNOSIS — C56.1 MALIGNANT NEOPLASM OF RIGHT OVARY: ICD-10-CM

## 2024-06-02 DIAGNOSIS — C54.1 ENDOMETRIAL CANCER: ICD-10-CM

## 2024-06-02 DIAGNOSIS — R12 HEART BURN: ICD-10-CM

## 2024-06-03 RX ORDER — PANTOPRAZOLE SODIUM 40 MG/1
40 TABLET, DELAYED RELEASE ORAL
Qty: 30 TABLET | Refills: 0 | Status: SHIPPED | OUTPATIENT
Start: 2024-06-03

## 2024-06-04 ENCOUNTER — LAB VISIT (OUTPATIENT)
Dept: LAB | Facility: HOSPITAL | Age: 39
End: 2024-06-04
Attending: NURSE PRACTITIONER
Payer: COMMERCIAL

## 2024-06-04 ENCOUNTER — OFFICE VISIT (OUTPATIENT)
Dept: HEMATOLOGY/ONCOLOGY | Facility: CLINIC | Age: 39
End: 2024-06-04
Payer: COMMERCIAL

## 2024-06-04 VITALS
HEART RATE: 98 BPM | HEIGHT: 62 IN | BODY MASS INDEX: 46.83 KG/M2 | SYSTOLIC BLOOD PRESSURE: 106 MMHG | WEIGHT: 254.5 LBS | RESPIRATION RATE: 18 BRPM | TEMPERATURE: 98 F | DIASTOLIC BLOOD PRESSURE: 77 MMHG | OXYGEN SATURATION: 97 %

## 2024-06-04 DIAGNOSIS — E86.0 DEHYDRATION: ICD-10-CM

## 2024-06-04 DIAGNOSIS — R11.0 CHEMOTHERAPY-INDUCED NAUSEA: ICD-10-CM

## 2024-06-04 DIAGNOSIS — T45.1X5A CHEMOTHERAPY-INDUCED NAUSEA: ICD-10-CM

## 2024-06-04 DIAGNOSIS — T50.905A ADVERSE EFFECT OF DRUG, INITIAL ENCOUNTER: ICD-10-CM

## 2024-06-04 DIAGNOSIS — K59.00 CONSTIPATION, UNSPECIFIED CONSTIPATION TYPE: ICD-10-CM

## 2024-06-04 DIAGNOSIS — Z79.899 ON ANTINEOPLASTIC CHEMOTHERAPY: ICD-10-CM

## 2024-06-04 DIAGNOSIS — C78.00 MALIGNANT NEOPLASM METASTATIC TO LUNG, UNSPECIFIED LATERALITY: ICD-10-CM

## 2024-06-04 DIAGNOSIS — C56.1 MALIGNANT NEOPLASM OF RIGHT OVARY: Primary | ICD-10-CM

## 2024-06-04 DIAGNOSIS — C54.1 ENDOMETRIAL CANCER: ICD-10-CM

## 2024-06-04 DIAGNOSIS — T50.905D ADVERSE EFFECT OF DRUG, SUBSEQUENT ENCOUNTER: ICD-10-CM

## 2024-06-04 LAB
ALBUMIN SERPL-MCNC: 3.7 G/DL (ref 3.5–5)
ALBUMIN/GLOB SERPL: 1.2 RATIO (ref 1.1–2)
ALP SERPL-CCNC: 93 UNIT/L (ref 40–150)
ALT SERPL-CCNC: 50 UNIT/L (ref 0–55)
ANION GAP SERPL CALC-SCNC: 7 MEQ/L
AST SERPL-CCNC: 25 UNIT/L (ref 5–34)
BASOPHILS # BLD AUTO: 0.04 X10(3)/MCL
BASOPHILS NFR BLD AUTO: 0.5 %
BILIRUB SERPL-MCNC: 0.3 MG/DL
BUN SERPL-MCNC: 10.1 MG/DL (ref 7–18.7)
CALCIUM SERPL-MCNC: 9.5 MG/DL (ref 8.4–10.2)
CANCER AG125 SERPL-ACNC: 11 UNIT/ML (ref 0–35)
CHLORIDE SERPL-SCNC: 108 MMOL/L (ref 98–107)
CO2 SERPL-SCNC: 27 MMOL/L (ref 22–29)
CREAT SERPL-MCNC: 0.71 MG/DL (ref 0.55–1.02)
CREAT/UREA NIT SERPL: 14
EOSINOPHIL # BLD AUTO: 0.06 X10(3)/MCL (ref 0–0.9)
EOSINOPHIL NFR BLD AUTO: 0.8 %
ERYTHROCYTE [DISTWIDTH] IN BLOOD BY AUTOMATED COUNT: 14.4 % (ref 11.5–17)
GFR SERPLBLD CREATININE-BSD FMLA CKD-EPI: >60 ML/MIN/1.73/M2
GLOBULIN SER-MCNC: 3.1 GM/DL (ref 2.4–3.5)
GLUCOSE SERPL-MCNC: 137 MG/DL (ref 74–100)
HCT VFR BLD AUTO: 34.1 % (ref 37–47)
HGB BLD-MCNC: 11.3 G/DL (ref 12–16)
IMM GRANULOCYTES # BLD AUTO: 0.1 X10(3)/MCL (ref 0–0.04)
IMM GRANULOCYTES NFR BLD AUTO: 1.3 %
LYMPHOCYTES # BLD AUTO: 1.7 X10(3)/MCL (ref 0.6–4.6)
LYMPHOCYTES NFR BLD AUTO: 22.4 %
MAGNESIUM SERPL-MCNC: 1.8 MG/DL (ref 1.6–2.6)
MCH RBC QN AUTO: 32.1 PG (ref 27–31)
MCHC RBC AUTO-ENTMCNC: 33.1 G/DL (ref 33–36)
MCV RBC AUTO: 96.9 FL (ref 80–94)
MONOCYTES # BLD AUTO: 0.52 X10(3)/MCL (ref 0.1–1.3)
MONOCYTES NFR BLD AUTO: 6.9 %
NEUTROPHILS # BLD AUTO: 5.17 X10(3)/MCL (ref 2.1–9.2)
NEUTROPHILS NFR BLD AUTO: 68.1 %
PLATELET # BLD AUTO: 273 X10(3)/MCL (ref 130–400)
PMV BLD AUTO: 9.1 FL (ref 7.4–10.4)
POTASSIUM SERPL-SCNC: 4 MMOL/L (ref 3.5–5.1)
PROT SERPL-MCNC: 6.8 GM/DL (ref 6.4–8.3)
RBC # BLD AUTO: 3.52 X10(6)/MCL (ref 4.2–5.4)
SODIUM SERPL-SCNC: 142 MMOL/L (ref 136–145)
WBC # SPEC AUTO: 7.59 X10(3)/MCL (ref 4.5–11.5)

## 2024-06-04 PROCEDURE — 3078F DIAST BP <80 MM HG: CPT | Mod: CPTII,S$GLB,, | Performed by: NURSE PRACTITIONER

## 2024-06-04 PROCEDURE — 3008F BODY MASS INDEX DOCD: CPT | Mod: CPTII,S$GLB,, | Performed by: NURSE PRACTITIONER

## 2024-06-04 PROCEDURE — 85025 COMPLETE CBC W/AUTO DIFF WBC: CPT

## 2024-06-04 PROCEDURE — 99999 PR PBB SHADOW E&M-EST. PATIENT-LVL V: CPT | Mod: PBBFAC,,, | Performed by: INTERNAL MEDICINE

## 2024-06-04 PROCEDURE — 83735 ASSAY OF MAGNESIUM: CPT

## 2024-06-04 PROCEDURE — 1159F MED LIST DOCD IN RCRD: CPT | Mod: CPTII,S$GLB,, | Performed by: NURSE PRACTITIONER

## 2024-06-04 PROCEDURE — 36415 COLL VENOUS BLD VENIPUNCTURE: CPT

## 2024-06-04 PROCEDURE — 80053 COMPREHEN METABOLIC PANEL: CPT

## 2024-06-04 PROCEDURE — 3074F SYST BP LT 130 MM HG: CPT | Mod: CPTII,S$GLB,, | Performed by: NURSE PRACTITIONER

## 2024-06-04 PROCEDURE — 99215 OFFICE O/P EST HI 40 MIN: CPT | Mod: S$GLB,,, | Performed by: NURSE PRACTITIONER

## 2024-06-04 PROCEDURE — 1160F RVW MEDS BY RX/DR IN RCRD: CPT | Mod: CPTII,S$GLB,, | Performed by: NURSE PRACTITIONER

## 2024-06-04 PROCEDURE — 86304 IMMUNOASSAY TUMOR CA 125: CPT

## 2024-06-04 RX ORDER — SODIUM CHLORIDE 0.9 % (FLUSH) 0.9 %
10 SYRINGE (ML) INJECTION
Status: CANCELLED | OUTPATIENT
Start: 2024-06-05

## 2024-06-04 RX ORDER — DIPHENHYDRAMINE HYDROCHLORIDE 50 MG/ML
50 INJECTION INTRAMUSCULAR; INTRAVENOUS ONCE AS NEEDED
Status: CANCELLED | OUTPATIENT
Start: 2024-06-05

## 2024-06-04 RX ORDER — FAMOTIDINE 10 MG/ML
20 INJECTION INTRAVENOUS
Status: CANCELLED | OUTPATIENT
Start: 2024-06-05

## 2024-06-04 RX ORDER — HEPARIN 100 UNIT/ML
500 SYRINGE INTRAVENOUS
Status: CANCELLED | OUTPATIENT
Start: 2024-06-05

## 2024-06-04 RX ORDER — SODIUM CHLORIDE 0.9 % (FLUSH) 0.9 %
10 SYRINGE (ML) INJECTION
Status: CANCELLED | OUTPATIENT
Start: 2024-06-06

## 2024-06-04 RX ORDER — HEPARIN 100 UNIT/ML
500 SYRINGE INTRAVENOUS
Status: CANCELLED | OUTPATIENT
Start: 2024-06-06

## 2024-06-04 RX ORDER — EPINEPHRINE 0.3 MG/.3ML
0.3 INJECTION SUBCUTANEOUS ONCE AS NEEDED
Status: CANCELLED | OUTPATIENT
Start: 2024-06-05

## 2024-06-04 RX ORDER — LACTULOSE 10 G/15ML
30 SOLUTION ORAL; RECTAL 2 TIMES DAILY
COMMUNITY
Start: 2024-05-18

## 2024-06-04 RX ORDER — DIPHENHYDRAMINE HYDROCHLORIDE 50 MG/ML
25 INJECTION INTRAMUSCULAR; INTRAVENOUS
Status: CANCELLED | OUTPATIENT
Start: 2024-06-05

## 2024-06-04 RX ORDER — PROCHLORPERAZINE MALEATE 10 MG
10 TABLET ORAL EVERY 6 HOURS PRN
Qty: 30 TABLET | Refills: 1 | Status: SHIPPED | OUTPATIENT
Start: 2024-06-04

## 2024-06-04 RX ORDER — PROCHLORPERAZINE EDISYLATE 5 MG/ML
10 INJECTION INTRAMUSCULAR; INTRAVENOUS ONCE AS NEEDED
Status: CANCELLED | OUTPATIENT
Start: 2024-06-05

## 2024-06-04 RX ORDER — MONTELUKAST SODIUM 10 MG/1
10 TABLET ORAL
Status: CANCELLED | OUTPATIENT
Start: 2024-06-05

## 2024-06-04 NOTE — PROGRESS NOTES
Subjective:       Patient ID: Ruben Clements is a 39 y.o. female.    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     **An addendum to the pathology report from 9/7/2021 internal 9/17/2021 states that the pathologist and the submitting physician's physician assistant had a discussion, and the possibility that the ovary is a metastatic lesion from the endometrium was discussed with a metastasis to the ovary being favored by the clinician.  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     Current Treatment:   Carboplatin and paclitaxel every 3 weeks started on 03/13/2024 (since recurrence was greater than a year since her last carbo/taxol dose). Treating in [place].    Treatment History:  Adjuvant carboplatin and paclitaxel given every 3 weeks for 6 cycles, started on 10/27/2021.  Completed on 2/16/2022.      HPI:  Patient who was having irregular menses, pelvic pain, abdominal pain along with nausea and vomiting.  She presented to see her OB/GYN, imaging was done that showed ovarian cyst.  She underwent a right oophorectomy on 6/8/2021, pathology revealed a 15 cm well-differentiated endometrioid adenocarcinoma involving the right ovary, right fallopian tube was uninvolved.  She was then referred to Dr. Darryn Pires with GYN oncology in [place], [place].  On 7/16/2021, she underwent Terra hereditary cancer test, this was negative for pathogenic mutations.  Her [facility] breast cancer risk  assessment was 13.9% (general population was 13.2%, no significant increased risk compared to general population).  She underwent an endometrial biopsy on 8/18/2021 that revealed FIGO grade 1 endometrioid endometrial adenocarcinoma.  She then underwent a robotic hysterectomy, left oophorectomy, bilateral pelvic sentinel node dissection with partial omentectomy and peritoneal biopsies of cul-de-sac nodules.  Pathology revealed endometrioid endometrial adenocarcinoma, FIGO 1 with less than 50% invasion.  Margins were negative, lymph nodes were negative, cul-de-sac nodules were positive for metastatic endometrial carcinoma.  Originally, it was discussed that the cul-de-sac lesions were from her ovarian cancer, this would stage her right ovarian cancer as a pT2b, N0, M0 stage IIB and would stage her endometrial cancer as a pT1a, N0, M0 stage IA.  Per an addendum on 9/17/2021, further discussion suggested a pT3a, N0, M0 stage IIIA endometrial cancer.  Patient was seen by Dr. Velazquez on 9/23/2021, plan was to treat with adjuvant carbo Taxol for 6 cycles.  Due to the fact that the patient lives in [place], [place], it was decided to refer to an oncologist in [place], [place] as this was closer to home.  I initially saw the patient on 10/18/2021 at that visit, she stated to overall be feeling well.  She did have some numbness and tingling, also some depression and anxiety, but had no other major issues to discuss.  Patient started her treatment on 10/27/2021, completed on 2/16/2022.  Surveillance CT scans of the chest, abdomen, and pelvis started on 4/11/2022, these showed no evidence of disease. Most recently done on 08/14/2023 showing interim increase in size of the small nodule at the right suprahilar region, previously measuring 3-4 mm, now measuring 6-7 mm. CT scan of the chest, abdomen, and pelvis done on 11/22/2023 Enlarging right apical lung nodule now measuring 1.1 x 1.0 cm.  There was a new 1.0 cm soft tissue  nodule in the fat and musculature of the left lateral lower abdominal wall.    PET/CT scan done on 12/06/2023 showed hypermetabolic 1.0 cm solid right upper lobe nodule with 2 hypermetabolic lymph nodes, 1 in the right axilla and 1 in the portacaval system.  There was a 1.1 cm nodule in the left lower anterior abdominal wall that was also hypermetabolic.    Right axillary lymph node done on 12/11/2023 showed benign findings with no evidence of malignancy.  There was still concern that malignancy was present, patient ended up at [facility].    Left lower abdominal wall nodule biopsy done on 01/25/2024 at [facility] revealed fragments of low-grade endometrioid adenocarcinoma.    She then underwent lung biopsy of the right upper lobe lung nodule at [facility] on 02/20/2024, this returned positive for metastatic endometrial carcinoma.    Started carboplatin and paclitaxel on 03/13/2024.    Interval History:   Patient presents to clinic for scheduled follow up appointment. She started treatment for recurrent malignancy on 03/13/2024.  She is feeling well overall.   She did have a significant reaction to carboplatin with cycle 3.  She received carbo desensitization with cycle 4 and did okay with this but did have to stop and slow down the carboplatin 1 point.  She does state that she had a hard time last cycle due to nausea and constipation as well as dehydration which required an emergency room visit. She also recently was treated with a Z-He for an upper respiratory infection that she completes today.  She does feel like her symptoms are much improved.  She is having regular bowel movements now and now has lactulose as needed at home.  Some numbness/tingling in her hands/feet but this remains intermittent.    Past Medical History:   Diagnosis Date    CVA (cerebral vascular accident)     Endometrial cancer 08/18/2021    Malignant neoplasm of right ovary 06/08/2021      Past Surgical History:   Procedure Laterality  Date    ADENOIDECTOMY      CHOLECYSTECTOMY      HYSTERECTOMY      SALPINGOOPHORECTOMY      TONSILLECTOMY       Social History     Socioeconomic History    Marital status: Single   Tobacco Use    Smoking status: Never    Smokeless tobacco: Never   Substance and Sexual Activity    Alcohol use: Not Currently     Comment: 1-2 times per month    Drug use: Never    Sexual activity: Not Currently     Birth control/protection: Abstinence      Family History   Problem Relation Name Age of Onset    Diabetes Mother Aida Clements     Hypertension Mother Aida Clements     Depression Mother Aida Clements     Depression Father Cristobal Clements     Diabetes Father Cristobal Clements     Early death Father Cristobal Clements         Heart failure    Stroke Father Cristobal Clements     Depression Sister Radha Clements-Quan     Depression Sister Haily Clements     Diabetes Sister Haily Clements     COPD Maternal Grandmother Sudha Rojas         CHF and COPD    Colon cancer Maternal Grandfather Denilson [place] 60    Prostate cancer Maternal Grandfather Denilson Rojas 60    Stomach cancer Maternal Great-Grandmother        Review of patient's allergies indicates:   Allergen Reactions    Carboplatin Itching, Palpitations and Shortness Of Breath      Review of Systems   Constitutional:  Negative for appetite change and unexpected weight change.   HENT:  Negative for mouth sores.    Eyes:  Negative for visual disturbance.   Respiratory:  Negative for cough and shortness of breath.    Cardiovascular:  Negative for chest pain.   Gastrointestinal:  Negative for abdominal pain and diarrhea.   Genitourinary:  Negative for frequency.   Musculoskeletal:  Negative for back pain.   Integumentary:  Negative for rash.   Neurological:  Negative for headaches.   Hematological:  Negative for adenopathy.   Psychiatric/Behavioral:  The patient is not nervous/anxious.          Objective:      Physical Exam  Vitals reviewed.   Constitutional:       General: She is  not in acute distress.     Appearance: Normal appearance.   HENT:      Head: Normocephalic and atraumatic.      Nose: Nose normal.      Mouth/Throat:      Mouth: Mucous membranes are moist.   Eyes:      Extraocular Movements: Extraocular movements intact.      Conjunctiva/sclera: Conjunctivae normal.   Cardiovascular:      Rate and Rhythm: Normal rate and regular rhythm.      Pulses: Normal pulses.      Heart sounds: Normal heart sounds.   Pulmonary:      Effort: Pulmonary effort is normal.      Breath sounds: Normal breath sounds.   Abdominal:      General: Bowel sounds are normal.      Palpations: Abdomen is soft.   Musculoskeletal:         General: No swelling. Normal range of motion.      Cervical back: Normal range of motion and neck supple.      Right lower leg: No edema.      Left lower leg: No edema.   Lymphadenopathy:      Cervical: No cervical adenopathy.   Skin:     General: Skin is warm and dry.   Neurological:      General: No focal deficit present.      Mental Status: She is alert and oriented to person, place, and time. Mental status is at baseline.   Psychiatric:         Mood and Affect: Mood normal.         Behavior: Behavior normal.         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/2021 with surgery (cul-de-sac nodules found during completion hysterectomy and salpingo-oophorectomy)  pT1a, N0, M0 stage IA endometrial cancer diagnosed 8/18/2021   Recurrent endometrial cancer of a left abdominal wall mass and right lung as of January 2024 in February 2024 respectively     **An addendum to the pathology report from 9/7/2021 internal 9/17/2021 states that the pathologist and the submitting physician's physician assistant had a discussion, and the possibility that the ovary is a metastatic lesion from the endometrium was discussed with a metastasis to the ovary being favored by the clinician.  In this  case, the tumor would be classified as a pT3a uterine endometrioid carcinoma which would be a stage III.        Plan:     Patient completed 6 cycles of adjuvant carboplatin and paclitaxel on 02/16/2022.     CT scan on 11/22/2023 showed increasing size of the right apical nodule, now measuring 1.0 x 1.1 cm.  There was also a new 1.0 cm nodule in the left abdominal wall.    PET/CT scan done on 12/06/2023 shows hypermetabolic right lung nodule, left abdominal wall nodule, right axillary lymph node and portacaval lymph node.    Right axillary lymph node done on 12/11/2023 benign    Left lower abdominal wall nodule biopsy done on 01/25/2024 at [facility] showed recurrent endometrial cancer.    The lung lesion was positive on 02/20/2024 for metastatic endometrial carcinoma.  The patient discussed with their gyn Oncology team possible treatment options, they recommended systemic therapy with carboplatin and paclitaxel.  The patient would like to treat with systemic therapy.    Recent CT scans at [facility] on 05/09/2024 showed interval decrease in the right upper lobe pulmonary metastasis which is now subcentimeter in size with no new disease seen in the chest, abdomen or pelvis.  There recommendation was to continue with carbo Taxol for 3 more cycles.    Continue carboplatin and paclitaxel-- Started on 03/13/2024.     Will need to desensitize her due to significant carbo reaction. This was done in [place] for cycle 4.  We will also do desensitization with cycle 5    Protonix and Pepcid for heartburn, tums PRN    Continue Zofran as needed for nausea, we will also give prescription for Compazine.    Recommended she use a stool softener 1-2 times daily and lactulose as needed.    We will have her set up for IV fluids on Thursday and Monday.  She knows to let us know if she needs fluids beyond that.-we will set this up in [place]    Return to clinic for TD visit with labs    Labs: CBC, CMP, mag, UPT, and      All  questions were answered to the best of my ability and she understands the plan moving forward.      Yolanda Daniel, KIKAP-C

## 2024-06-04 NOTE — PROGRESS NOTES
Subjective:       Patient ID: Ruben Clements is a 39 y.o. female.    Chief Complaint: No chief complaint on file.      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     **An addendum to the pathology report from 9/7/2021 internal 9/17/2021 states that the pathologist and the submitting physician's physician assistant had a discussion, and the possibility that the ovary is a metastatic lesion from the endometrium was discussed with a metastasis to the ovary being favored by the clinician.  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     Current Treatment:   Carboplatin and paclitaxel every 3 weeks started on 03/13/2024 (since recurrence was greater than a year since her last carbo/taxol dose). Treating in [place].    Treatment History:  Adjuvant carboplatin and paclitaxel given every 3 weeks for 6 cycles, started on 10/27/2021.  Completed on 2/16/2022.      HPI:  Patient who was having irregular menses, pelvic pain, abdominal pain along with nausea and vomiting.  She presented to see her OB/GYN, imaging was done that showed ovarian cyst.  She underwent a right oophorectomy on 6/8/2021, pathology revealed a 15 cm well-differentiated endometrioid adenocarcinoma involving the right ovary, right fallopian tube was uninvolved.  She was then referred to Dr. Darryn Pires with GYN oncology in [place], [place].  On 7/16/2021, she underwent [employer] hereditary cancer test, this was negative for pathogenic mutations.  Her [facility] breast cancer risk assessment was  13.9% (general population was 13.2%, no significant increased risk compared to general population).  She underwent an endometrial biopsy on 8/18/2021 that revealed FIGO grade 1 endometrioid endometrial adenocarcinoma.  She then underwent a robotic hysterectomy, left oophorectomy, bilateral pelvic sentinel node dissection with partial omentectomy and peritoneal biopsies of cul-de-sac nodules.  Pathology revealed endometrioid endometrial adenocarcinoma, FIGO 1 with less than 50% invasion.  Margins were negative, lymph nodes were negative, cul-de-sac nodules were positive for metastatic endometrial carcinoma.  Originally, it was discussed that the cul-de-sac lesions were from her ovarian cancer, this would stage her right ovarian cancer as a pT2b, N0, M0 stage IIB and would stage her endometrial cancer as a pT1a, N0, M0 stage IA.  Per an addendum on 9/17/2021, further discussion suggested a pT3a, N0, M0 stage IIIA endometrial cancer.  Patient was seen by Dr. Velazquez on 9/23/2021, plan was to treat with adjuvant carbo Taxol for 6 cycles.  Due to the fact that the patient lives in [place], [place], it was decided to refer to an oncologist in [place], [place] as this was closer to home.  I initially saw the patient on 10/18/2021 at that visit, she stated to overall be feeling well.  She did have some numbness and tingling, also some depression and anxiety, but had no other major issues to discuss.  Patient started her treatment on 10/27/2021, completed on 2/16/2022.  Surveillance CT scans of the chest, abdomen, and pelvis started on 4/11/2022, these showed no evidence of disease. Most recently done on 08/14/2023 showing interim increase in size of the small nodule at the right suprahilar region, previously measuring 3-4 mm, now measuring 6-7 mm. CT scan of the chest, abdomen, and pelvis done on 11/22/2023 Enlarging right apical lung nodule now measuring 1.1 x 1.0 cm.  There was a new 1.0 cm soft tissue nodule in the  fat and musculature of the left lateral lower abdominal wall.    PET/CT scan done on 12/06/2023 showed hypermetabolic 1.0 cm solid right upper lobe nodule with 2 hypermetabolic lymph nodes, 1 in the right axilla and 1 in the portacaval system.  There was a 1.1 cm nodule in the left lower anterior abdominal wall that was also hypermetabolic.    Right axillary lymph node done on 12/11/2023 showed benign findings with no evidence of malignancy.  There was still concern that malignancy was present, patient ended up at [facility].    Left lower abdominal wall nodule biopsy done on 01/25/2024 at [facility] revealed fragments of low-grade endometrioid adenocarcinoma.    She then underwent lung biopsy of the right upper lobe lung nodule at [facility] on 02/20/2024, this returned positive for metastatic endometrial carcinoma.    Started carboplatin and paclitaxel on 03/13/2024.    Interval History:   Patient presents to clinic for scheduled follow up appointment. She started treatment for recurrent malignancy on 03/13/2024.  She is feeling well overall.   She did have a significant reaction to carboplatin with cycle 3.  She received carbo desensitization with cycle 4 and did okay with this but did have to stop and slow down the carboplatin 1 point.  She does state that she had a hard time last cycle due to nausea and constipation as well as dehydration which required an emergency room visit. She also recently was treated with a Z-He for an upper respiratory infection that she completes today.  She does feel like her symptoms are much improved.  She is having regular bowel movements now and now has lactulose as needed at home.  Some numbness/tingling in her hands/feet but this remains intermittent.    Past Medical History:   Diagnosis Date    CVA (cerebral vascular accident)     Endometrial cancer 08/18/2021    Malignant neoplasm of right ovary 06/08/2021      Past Surgical History:   Procedure Laterality Date     ADENOIDECTOMY      CHOLECYSTECTOMY      HYSTERECTOMY      SALPINGOOPHORECTOMY      TONSILLECTOMY       Social History     Socioeconomic History    Marital status: Single   Tobacco Use    Smoking status: Never    Smokeless tobacco: Never   Substance and Sexual Activity    Alcohol use: Not Currently     Comment: 1-2 times per month    Drug use: Never    Sexual activity: Not Currently     Birth control/protection: Abstinence      Family History   Problem Relation Name Age of Onset    Diabetes Mother Aida Clements     Hypertension Mother Aida Clements     Depression Mother Aida Clements     Depression Father Cristobal Clements     Diabetes Father Cristobal Clements     Early death Father Cristobal Clements         Heart failure    Stroke Father Cristobal Clements     Depression Sister Radha Clements-Quan     Depression Sister Haily Clements     Diabetes Sister Haily Clements     COPD Maternal Grandmother Sudha Rojas         CHF and COPD    Colon cancer Maternal Grandfather Denilson [place] 60    Prostate cancer Maternal Grandfather Denilson [place] 60    Stomach cancer Maternal Great-Grandmother        Review of patient's allergies indicates:   Allergen Reactions    Carboplatin Itching, Palpitations and Shortness Of Breath      Review of Systems   Constitutional:  Negative for appetite change and unexpected weight change.   HENT:  Negative for mouth sores.    Eyes:  Negative for visual disturbance.   Respiratory:  Positive for shortness of breath. Negative for cough.    Cardiovascular:  Negative for chest pain.   Gastrointestinal:  Positive for abdominal pain. Negative for diarrhea.   Genitourinary:  Negative for frequency.   Musculoskeletal:  Negative for back pain.   Integumentary:  Negative for rash.   Neurological:  Negative for headaches.   Hematological:  Negative for adenopathy.   Psychiatric/Behavioral:  The patient is not nervous/anxious.          Objective:      Physical Exam  Vitals reviewed.   Constitutional:        General: She is not in acute distress.     Appearance: Normal appearance.   HENT:      Head: Normocephalic and atraumatic.      Nose: Nose normal.      Mouth/Throat:      Mouth: Mucous membranes are moist.   Eyes:      Extraocular Movements: Extraocular movements intact.      Conjunctiva/sclera: Conjunctivae normal.   Cardiovascular:      Rate and Rhythm: Normal rate and regular rhythm.      Pulses: Normal pulses.      Heart sounds: Normal heart sounds.   Pulmonary:      Effort: Pulmonary effort is normal.      Breath sounds: Normal breath sounds.   Abdominal:      General: Bowel sounds are normal.      Palpations: Abdomen is soft.   Musculoskeletal:         General: No swelling. Normal range of motion.      Cervical back: Normal range of motion and neck supple.      Right lower leg: No edema.      Left lower leg: No edema.   Lymphadenopathy:      Cervical: No cervical adenopathy.   Skin:     General: Skin is warm and dry.   Neurological:      General: No focal deficit present.      Mental Status: She is alert and oriented to person, place, and time. Mental status is at baseline.   Psychiatric:         Mood and Affect: Mood normal.         Behavior: Behavior normal.         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/2021 with surgery (cul-de-sac nodules found during completion hysterectomy and salpingo-oophorectomy)  pT1a, N0, M0 stage IA endometrial cancer diagnosed 8/18/2021   Recurrent endometrial cancer of a left abdominal wall mass and right lung as of January 2024 in February 2024 respectively     **An addendum to the pathology report from 9/7/2021 internal 9/17/2021 states that the pathologist and the submitting physician's physician assistant had a discussion, and the possibility that the ovary is a metastatic lesion from the endometrium was discussed with a metastasis to the ovary being favored by the  clinician.  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        Plan:     Patient completed 6 cycles of adjuvant carboplatin and paclitaxel on 02/16/2022.     CT scan on 11/22/2023 showed increasing size of the right apical nodule, now measuring 1.0 x 1.1 cm.  There was also a new 1.0 cm nodule in the left abdominal wall.    PET/CT scan done on 12/06/2023 shows hypermetabolic right lung nodule, left abdominal wall nodule, right axillary lymph node and portacaval lymph node.    Right axillary lymph node done on 12/11/2023 benign    Left lower abdominal wall nodule biopsy done on 01/25/2024 at [facility] showed recurrent endometrial cancer.    The lung lesion was positive on 02/20/2024 for metastatic endometrial carcinoma.  The patient discussed with their gyn Oncology team possible treatment options, they recommended systemic therapy with carboplatin and paclitaxel.  The patient would like to treat with systemic therapy.    Recent CT scans at [facility] on 05/09/2024 showed interval decrease in the right upper lobe pulmonary metastasis which is now subcentimeter in size with no new disease seen in the chest, abdomen or pelvis.  There recommendation was to continue with carbo Taxol for 3 more cycles.    Continue carboplatin and paclitaxel-- Started on 03/13/2024.     Will need to desensitize her due to significant carbo reaction. This was done in [place] for cycle 4.  We will also do desensitization with cycle 5    Protonix and Pepcid for heartburn, tums PRN    Continue Zofran as needed for nausea, we will also give prescription for Compazine.    Recommended she use a stool softener 1-2 times daily and lactulose as needed.    We will have her set up for IV fluids on Thursday and Monday.  She knows to let us know if she needs fluids beyond that.-we will set this up in [place]    Return to clinic for TD visit with labs    Labs: CBC, CMP, mag, UPT,  and      All questions were answered to the best of my ability and she understands the plan moving forward.      Yolanda Daniel, KIKAP-C

## 2024-06-05 ENCOUNTER — INFUSION (OUTPATIENT)
Dept: INFUSION THERAPY | Facility: HOSPITAL | Age: 39
End: 2024-06-05
Attending: INTERNAL MEDICINE
Payer: COMMERCIAL

## 2024-06-05 VITALS
WEIGHT: 250.88 LBS | TEMPERATURE: 98 F | SYSTOLIC BLOOD PRESSURE: 100 MMHG | BODY MASS INDEX: 46.17 KG/M2 | OXYGEN SATURATION: 99 % | DIASTOLIC BLOOD PRESSURE: 66 MMHG | HEART RATE: 87 BPM | RESPIRATION RATE: 18 BRPM | HEIGHT: 62 IN

## 2024-06-05 DIAGNOSIS — C54.1 ENDOMETRIAL CANCER: Primary | ICD-10-CM

## 2024-06-05 PROCEDURE — 96417 CHEMO IV INFUS EACH ADDL SEQ: CPT

## 2024-06-05 PROCEDURE — 96375 TX/PRO/DX INJ NEW DRUG ADDON: CPT

## 2024-06-05 PROCEDURE — 63600175 PHARM REV CODE 636 W HCPCS: Performed by: NURSE PRACTITIONER

## 2024-06-05 PROCEDURE — 96413 CHEMO IV INFUSION 1 HR: CPT

## 2024-06-05 PROCEDURE — 63600175 PHARM REV CODE 636 W HCPCS: Performed by: INTERNAL MEDICINE

## 2024-06-05 PROCEDURE — 25000003 PHARM REV CODE 250: Performed by: INTERNAL MEDICINE

## 2024-06-05 PROCEDURE — 96415 CHEMO IV INFUSION ADDL HR: CPT

## 2024-06-05 PROCEDURE — 96367 TX/PROPH/DG ADDL SEQ IV INF: CPT

## 2024-06-05 PROCEDURE — A4216 STERILE WATER/SALINE, 10 ML: HCPCS | Performed by: NURSE PRACTITIONER

## 2024-06-05 PROCEDURE — 96376 TX/PRO/DX INJ SAME DRUG ADON: CPT

## 2024-06-05 PROCEDURE — 25000003 PHARM REV CODE 250: Performed by: NURSE PRACTITIONER

## 2024-06-05 RX ORDER — HEPARIN 100 UNIT/ML
500 SYRINGE INTRAVENOUS
Status: DISCONTINUED | OUTPATIENT
Start: 2024-06-05 | End: 2024-06-06 | Stop reason: HOSPADM

## 2024-06-05 RX ORDER — DIPHENHYDRAMINE HYDROCHLORIDE 50 MG/ML
50 INJECTION INTRAMUSCULAR; INTRAVENOUS ONCE AS NEEDED
Status: DISCONTINUED | OUTPATIENT
Start: 2024-06-05 | End: 2024-06-06 | Stop reason: HOSPADM

## 2024-06-05 RX ORDER — DIPHENHYDRAMINE HYDROCHLORIDE 50 MG/ML
25 INJECTION INTRAMUSCULAR; INTRAVENOUS
Status: COMPLETED | OUTPATIENT
Start: 2024-06-05 | End: 2024-06-05

## 2024-06-05 RX ORDER — EPINEPHRINE 0.3 MG/.3ML
0.3 INJECTION SUBCUTANEOUS ONCE AS NEEDED
Status: DISCONTINUED | OUTPATIENT
Start: 2024-06-05 | End: 2024-06-06 | Stop reason: HOSPADM

## 2024-06-05 RX ORDER — PROCHLORPERAZINE EDISYLATE 5 MG/ML
10 INJECTION INTRAMUSCULAR; INTRAVENOUS ONCE AS NEEDED
Status: DISCONTINUED | OUTPATIENT
Start: 2024-06-05 | End: 2024-06-06 | Stop reason: HOSPADM

## 2024-06-05 RX ORDER — SODIUM CHLORIDE 0.9 % (FLUSH) 0.9 %
10 SYRINGE (ML) INJECTION
Status: DISCONTINUED | OUTPATIENT
Start: 2024-06-05 | End: 2024-06-06 | Stop reason: HOSPADM

## 2024-06-05 RX ORDER — FAMOTIDINE 10 MG/ML
20 INJECTION INTRAVENOUS
Status: COMPLETED | OUTPATIENT
Start: 2024-06-05 | End: 2024-06-05

## 2024-06-05 RX ORDER — MONTELUKAST SODIUM 10 MG/1
10 TABLET ORAL
Status: COMPLETED | OUTPATIENT
Start: 2024-06-05 | End: 2024-06-05

## 2024-06-05 RX ADMIN — APREPITANT 130 MG: 130 INJECTION, EMULSION INTRAVENOUS at 12:06

## 2024-06-05 RX ADMIN — CARBOPLATIN 90 MG: 10 INJECTION, SOLUTION INTRAVENOUS at 02:06

## 2024-06-05 RX ADMIN — SODIUM CHLORIDE: 9 INJECTION, SOLUTION INTRAVENOUS at 08:06

## 2024-06-05 RX ADMIN — CARBOPLATIN 800 MG: 10 INJECTION, SOLUTION INTRAVENOUS at 03:06

## 2024-06-05 RX ADMIN — HEPARIN 500 UNITS: 100 SYRINGE at 05:06

## 2024-06-05 RX ADMIN — Medication 10 ML: at 05:06

## 2024-06-05 RX ADMIN — MONTELUKAST SODIUM 10 MG: 10 TABLET, COATED ORAL at 12:06

## 2024-06-05 RX ADMIN — DEXAMETHASONE SODIUM PHOSPHATE 0.25 MG: 4 INJECTION, SOLUTION INTRA-ARTICULAR; INTRALESIONAL; INTRAMUSCULAR; INTRAVENOUS; SOFT TISSUE at 12:06

## 2024-06-05 RX ADMIN — CARBOPLATIN 10 MG: 10 INJECTION, SOLUTION INTRAVENOUS at 01:06

## 2024-06-05 RX ADMIN — PACLITAXEL 384 MG: 6 INJECTION, SOLUTION INTRAVENOUS at 09:06

## 2024-06-05 RX ADMIN — CARBOPLATIN 5 MG: 10 INJECTION, SOLUTION INTRAVENOUS at 01:06

## 2024-06-05 RX ADMIN — DIPHENHYDRAMINE HYDROCHLORIDE 25 MG: 50 INJECTION INTRAMUSCULAR; INTRAVENOUS at 12:06

## 2024-06-05 RX ADMIN — DEXAMETHASONE SODIUM PHOSPHATE 10 MG: 4 INJECTION, SOLUTION INTRA-ARTICULAR; INTRALESIONAL; INTRAMUSCULAR; INTRAVENOUS; SOFT TISSUE at 08:06

## 2024-06-05 RX ADMIN — FAMOTIDINE 20 MG: 10 INJECTION, SOLUTION INTRAVENOUS at 12:06

## 2024-06-05 RX ADMIN — FAMOTIDINE 20 MG: 10 INJECTION, SOLUTION INTRAVENOUS at 09:06

## 2024-06-05 RX ADMIN — DIPHENHYDRAMINE HYDROCHLORIDE 25 MG: 50 INJECTION INTRAMUSCULAR; INTRAVENOUS at 08:06

## 2024-06-06 ENCOUNTER — INFUSION (OUTPATIENT)
Dept: INFUSION THERAPY | Facility: HOSPITAL | Age: 39
End: 2024-06-06
Payer: COMMERCIAL

## 2024-06-06 VITALS
SYSTOLIC BLOOD PRESSURE: 124 MMHG | BODY MASS INDEX: 46.38 KG/M2 | HEART RATE: 103 BPM | OXYGEN SATURATION: 98 % | DIASTOLIC BLOOD PRESSURE: 65 MMHG | HEIGHT: 62 IN | WEIGHT: 252 LBS | RESPIRATION RATE: 20 BRPM | TEMPERATURE: 98 F

## 2024-06-06 DIAGNOSIS — E86.0 DEHYDRATION: ICD-10-CM

## 2024-06-06 DIAGNOSIS — C54.1 ENDOMETRIAL CANCER: Primary | ICD-10-CM

## 2024-06-06 DIAGNOSIS — C56.1 MALIGNANT NEOPLASM OF RIGHT OVARY: ICD-10-CM

## 2024-06-06 PROCEDURE — 25000003 PHARM REV CODE 250: Performed by: NURSE PRACTITIONER

## 2024-06-06 PROCEDURE — 96360 HYDRATION IV INFUSION INIT: CPT

## 2024-06-06 RX ORDER — SODIUM CHLORIDE 0.9 % (FLUSH) 0.9 %
10 SYRINGE (ML) INJECTION
Status: CANCELLED | OUTPATIENT
Start: 2024-06-06

## 2024-06-06 RX ORDER — HEPARIN 100 UNIT/ML
500 SYRINGE INTRAVENOUS
Status: CANCELLED | OUTPATIENT
Start: 2024-06-06

## 2024-06-06 RX ADMIN — SODIUM CHLORIDE 1000 ML: 9 INJECTION, SOLUTION INTRAVENOUS at 01:06

## 2024-06-06 NOTE — NURSING
Pt arrived for Taxol and Carbo desent.  Port accessed and pt tolerated infusion without issue.  Pt did c/o of slight itching on palm of hand at the end of infusion.  Pt is driving how so stated she would take a benadryl when she got home. Pt is also going for IVF in [place] tomorrow.  Port de accessed and pt discharged to home.

## 2024-06-10 ENCOUNTER — PATIENT MESSAGE (OUTPATIENT)
Dept: HEMATOLOGY/ONCOLOGY | Facility: CLINIC | Age: 39
End: 2024-06-10
Payer: COMMERCIAL

## 2024-06-11 ENCOUNTER — INFUSION (OUTPATIENT)
Dept: INFUSION THERAPY | Facility: HOSPITAL | Age: 39
End: 2024-06-11
Payer: COMMERCIAL

## 2024-06-11 VITALS
TEMPERATURE: 98 F | HEIGHT: 62 IN | BODY MASS INDEX: 46.29 KG/M2 | RESPIRATION RATE: 20 BRPM | DIASTOLIC BLOOD PRESSURE: 60 MMHG | SYSTOLIC BLOOD PRESSURE: 110 MMHG | WEIGHT: 251.56 LBS | HEART RATE: 98 BPM

## 2024-06-11 DIAGNOSIS — C54.1 ENDOMETRIAL CANCER: Primary | ICD-10-CM

## 2024-06-11 DIAGNOSIS — E86.0 DEHYDRATION: ICD-10-CM

## 2024-06-11 DIAGNOSIS — C56.1 MALIGNANT NEOPLASM OF RIGHT OVARY: ICD-10-CM

## 2024-06-11 PROCEDURE — 25000003 PHARM REV CODE 250: Performed by: NURSE PRACTITIONER

## 2024-06-11 PROCEDURE — 96360 HYDRATION IV INFUSION INIT: CPT

## 2024-06-11 RX ORDER — SODIUM CHLORIDE 0.9 % (FLUSH) 0.9 %
10 SYRINGE (ML) INJECTION
Status: CANCELLED | OUTPATIENT
Start: 2024-06-11

## 2024-06-11 RX ORDER — HEPARIN 100 UNIT/ML
500 SYRINGE INTRAVENOUS
Status: CANCELLED | OUTPATIENT
Start: 2024-06-11

## 2024-06-11 RX ADMIN — SODIUM CHLORIDE 1000 ML: 9 INJECTION, SOLUTION INTRAVENOUS at 01:06

## 2024-06-13 ENCOUNTER — INFUSION (OUTPATIENT)
Dept: INFUSION THERAPY | Facility: HOSPITAL | Age: 39
End: 2024-06-13
Payer: COMMERCIAL

## 2024-06-13 VITALS
WEIGHT: 248 LBS | SYSTOLIC BLOOD PRESSURE: 132 MMHG | BODY MASS INDEX: 45.64 KG/M2 | HEART RATE: 96 BPM | OXYGEN SATURATION: 96 % | HEIGHT: 62 IN | DIASTOLIC BLOOD PRESSURE: 82 MMHG | TEMPERATURE: 98 F | RESPIRATION RATE: 20 BRPM

## 2024-06-13 DIAGNOSIS — C56.1 MALIGNANT NEOPLASM OF RIGHT OVARY: ICD-10-CM

## 2024-06-13 DIAGNOSIS — C54.1 ENDOMETRIAL CANCER: Primary | ICD-10-CM

## 2024-06-13 DIAGNOSIS — E86.0 DEHYDRATION: ICD-10-CM

## 2024-06-13 PROCEDURE — 25000003 PHARM REV CODE 250: Performed by: NURSE PRACTITIONER

## 2024-06-13 PROCEDURE — 96360 HYDRATION IV INFUSION INIT: CPT

## 2024-06-13 PROCEDURE — 96361 HYDRATE IV INFUSION ADD-ON: CPT

## 2024-06-13 RX ORDER — HEPARIN 100 UNIT/ML
500 SYRINGE INTRAVENOUS
OUTPATIENT
Start: 2024-06-13

## 2024-06-13 RX ORDER — SODIUM CHLORIDE 0.9 % (FLUSH) 0.9 %
10 SYRINGE (ML) INJECTION
OUTPATIENT
Start: 2024-06-13

## 2024-06-13 RX ADMIN — SODIUM CHLORIDE 1000 ML: 9 INJECTION, SOLUTION INTRAVENOUS at 10:06

## 2024-06-25 ENCOUNTER — OFFICE VISIT (OUTPATIENT)
Dept: HEMATOLOGY/ONCOLOGY | Facility: CLINIC | Age: 39
End: 2024-06-25
Payer: COMMERCIAL

## 2024-06-25 ENCOUNTER — LAB VISIT (OUTPATIENT)
Dept: LAB | Facility: HOSPITAL | Age: 39
End: 2024-06-25
Attending: NURSE PRACTITIONER
Payer: COMMERCIAL

## 2024-06-25 VITALS
WEIGHT: 255.88 LBS | SYSTOLIC BLOOD PRESSURE: 110 MMHG | TEMPERATURE: 98 F | HEIGHT: 62 IN | RESPIRATION RATE: 20 BRPM | OXYGEN SATURATION: 99 % | BODY MASS INDEX: 47.09 KG/M2 | HEART RATE: 78 BPM | DIASTOLIC BLOOD PRESSURE: 76 MMHG

## 2024-06-25 DIAGNOSIS — C56.1 MALIGNANT NEOPLASM OF RIGHT OVARY: ICD-10-CM

## 2024-06-25 DIAGNOSIS — T45.1X5A CHEMOTHERAPY-INDUCED PERIPHERAL NEUROPATHY: ICD-10-CM

## 2024-06-25 DIAGNOSIS — C54.1 ENDOMETRIAL CANCER: Primary | ICD-10-CM

## 2024-06-25 DIAGNOSIS — G62.0 CHEMOTHERAPY-INDUCED PERIPHERAL NEUROPATHY: ICD-10-CM

## 2024-06-25 DIAGNOSIS — E86.0 DEHYDRATION: ICD-10-CM

## 2024-06-25 DIAGNOSIS — T50.905D ADVERSE EFFECT OF DRUG, SUBSEQUENT ENCOUNTER: ICD-10-CM

## 2024-06-25 DIAGNOSIS — C54.1 ENDOMETRIAL CANCER: ICD-10-CM

## 2024-06-25 DIAGNOSIS — Z79.899 ON ANTINEOPLASTIC CHEMOTHERAPY: ICD-10-CM

## 2024-06-25 LAB
ALBUMIN SERPL-MCNC: 3.7 G/DL (ref 3.5–5)
ALBUMIN/GLOB SERPL: 1.3 RATIO (ref 1.1–2)
ALP SERPL-CCNC: 95 UNIT/L (ref 40–150)
ALT SERPL-CCNC: 70 UNIT/L (ref 0–55)
ANION GAP SERPL CALC-SCNC: 7 MEQ/L
AST SERPL-CCNC: 35 UNIT/L (ref 5–34)
BASOPHILS # BLD AUTO: 0.01 X10(3)/MCL
BASOPHILS NFR BLD AUTO: 0.2 %
BILIRUB SERPL-MCNC: 0.3 MG/DL
BUN SERPL-MCNC: 11.7 MG/DL (ref 7–18.7)
CALCIUM SERPL-MCNC: 9.7 MG/DL (ref 8.4–10.2)
CANCER AG125 SERPL-ACNC: 10.4 UNIT/ML (ref 0–35)
CHLORIDE SERPL-SCNC: 106 MMOL/L (ref 98–107)
CO2 SERPL-SCNC: 27 MMOL/L (ref 22–29)
CREAT SERPL-MCNC: 0.65 MG/DL (ref 0.55–1.02)
CREAT/UREA NIT SERPL: 18
EOSINOPHIL # BLD AUTO: 0.06 X10(3)/MCL (ref 0–0.9)
EOSINOPHIL NFR BLD AUTO: 1 %
ERYTHROCYTE [DISTWIDTH] IN BLOOD BY AUTOMATED COUNT: 14.6 % (ref 11.5–17)
GFR SERPLBLD CREATININE-BSD FMLA CKD-EPI: >60 ML/MIN/1.73/M2
GLOBULIN SER-MCNC: 2.8 GM/DL (ref 2.4–3.5)
GLUCOSE SERPL-MCNC: 129 MG/DL (ref 74–100)
HCT VFR BLD AUTO: 33.8 % (ref 37–47)
HGB BLD-MCNC: 11 G/DL (ref 12–16)
IMM GRANULOCYTES # BLD AUTO: 0.08 X10(3)/MCL (ref 0–0.04)
IMM GRANULOCYTES NFR BLD AUTO: 1.4 %
LYMPHOCYTES # BLD AUTO: 1.44 X10(3)/MCL (ref 0.6–4.6)
LYMPHOCYTES NFR BLD AUTO: 24.8 %
MAGNESIUM SERPL-MCNC: 1.8 MG/DL (ref 1.6–2.6)
MCH RBC QN AUTO: 32.2 PG (ref 27–31)
MCHC RBC AUTO-ENTMCNC: 32.5 G/DL (ref 33–36)
MCV RBC AUTO: 98.8 FL (ref 80–94)
MONOCYTES # BLD AUTO: 0.55 X10(3)/MCL (ref 0.1–1.3)
MONOCYTES NFR BLD AUTO: 9.5 %
NEUTROPHILS # BLD AUTO: 3.67 X10(3)/MCL (ref 2.1–9.2)
NEUTROPHILS NFR BLD AUTO: 63.1 %
PLATELET # BLD AUTO: 239 X10(3)/MCL (ref 130–400)
PMV BLD AUTO: 8.7 FL (ref 7.4–10.4)
POTASSIUM SERPL-SCNC: 3.8 MMOL/L (ref 3.5–5.1)
PROT SERPL-MCNC: 6.5 GM/DL (ref 6.4–8.3)
RBC # BLD AUTO: 3.42 X10(6)/MCL (ref 4.2–5.4)
SODIUM SERPL-SCNC: 140 MMOL/L (ref 136–145)
WBC # BLD AUTO: 5.81 X10(3)/MCL (ref 4.5–11.5)

## 2024-06-25 PROCEDURE — 83735 ASSAY OF MAGNESIUM: CPT

## 2024-06-25 PROCEDURE — 85025 COMPLETE CBC W/AUTO DIFF WBC: CPT

## 2024-06-25 PROCEDURE — 86304 IMMUNOASSAY TUMOR CA 125: CPT

## 2024-06-25 PROCEDURE — 1160F RVW MEDS BY RX/DR IN RCRD: CPT | Mod: CPTII,S$GLB,, | Performed by: NURSE PRACTITIONER

## 2024-06-25 PROCEDURE — 99999 PR PBB SHADOW E&M-EST. PATIENT-LVL V: CPT | Mod: PBBFAC,,, | Performed by: NURSE PRACTITIONER

## 2024-06-25 PROCEDURE — 80053 COMPREHEN METABOLIC PANEL: CPT

## 2024-06-25 PROCEDURE — 36415 COLL VENOUS BLD VENIPUNCTURE: CPT

## 2024-06-25 PROCEDURE — 3074F SYST BP LT 130 MM HG: CPT | Mod: CPTII,S$GLB,, | Performed by: NURSE PRACTITIONER

## 2024-06-25 PROCEDURE — 1159F MED LIST DOCD IN RCRD: CPT | Mod: CPTII,S$GLB,, | Performed by: NURSE PRACTITIONER

## 2024-06-25 PROCEDURE — 3008F BODY MASS INDEX DOCD: CPT | Mod: CPTII,S$GLB,, | Performed by: NURSE PRACTITIONER

## 2024-06-25 PROCEDURE — 99215 OFFICE O/P EST HI 40 MIN: CPT | Mod: S$GLB,,, | Performed by: NURSE PRACTITIONER

## 2024-06-25 PROCEDURE — 3078F DIAST BP <80 MM HG: CPT | Mod: CPTII,S$GLB,, | Performed by: NURSE PRACTITIONER

## 2024-06-25 RX ORDER — HEPARIN 100 UNIT/ML
500 SYRINGE INTRAVENOUS
Status: CANCELLED | OUTPATIENT
Start: 2024-06-26

## 2024-06-25 RX ORDER — MONTELUKAST SODIUM 10 MG/1
10 TABLET ORAL
Status: CANCELLED | OUTPATIENT
Start: 2024-06-26

## 2024-06-25 RX ORDER — SODIUM CHLORIDE 0.9 % (FLUSH) 0.9 %
10 SYRINGE (ML) INJECTION
Status: CANCELLED | OUTPATIENT
Start: 2024-06-26

## 2024-06-25 RX ORDER — DIPHENHYDRAMINE HYDROCHLORIDE 50 MG/ML
25 INJECTION INTRAMUSCULAR; INTRAVENOUS
Status: CANCELLED | OUTPATIENT
Start: 2024-06-26

## 2024-06-25 RX ORDER — DIPHENHYDRAMINE HYDROCHLORIDE 50 MG/ML
50 INJECTION INTRAMUSCULAR; INTRAVENOUS ONCE AS NEEDED
Status: CANCELLED | OUTPATIENT
Start: 2024-06-26

## 2024-06-25 RX ORDER — PROCHLORPERAZINE EDISYLATE 5 MG/ML
10 INJECTION INTRAMUSCULAR; INTRAVENOUS ONCE AS NEEDED
Status: CANCELLED | OUTPATIENT
Start: 2024-06-26

## 2024-06-25 RX ORDER — FAMOTIDINE 10 MG/ML
20 INJECTION INTRAVENOUS
Status: CANCELLED | OUTPATIENT
Start: 2024-06-26

## 2024-06-25 RX ORDER — EPINEPHRINE 0.3 MG/.3ML
0.3 INJECTION SUBCUTANEOUS ONCE AS NEEDED
Status: CANCELLED | OUTPATIENT
Start: 2024-06-26

## 2024-06-25 NOTE — PROGRESS NOTES
Subjective:       Patient ID: Ruben Clements is a 39 y.o. female.    Chief Complaint: Follow-up (No concerns-- appt with [facility] July 18th-19th with scans.  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     **An addendum to the pathology report from 9/7/2021 internal 9/17/2021 states that the pathologist and the submitting physician's physician assistant had a discussion, and the possibility that the ovary is a metastatic lesion from the endometrium was discussed with a metastasis to the ovary being favored by the clinician.  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     Current Treatment:   Carboplatin and paclitaxel every 3 weeks started on 03/13/2024 (since recurrence was greater than a year since her last carbo/taxol dose). Treating in [place].    Treatment History:  Adjuvant carboplatin and paclitaxel given every 3 weeks for 6 cycles, started on 10/27/2021.  Completed on 2/16/2022.      HPI:  Patient who was having irregular menses, pelvic pain, abdominal pain along with nausea and vomiting.  She presented to see her OB/GYN, imaging was done that showed ovarian cyst.  She underwent a right oophorectomy on 6/8/2021, pathology revealed a 15 cm well-differentiated endometrioid adenocarcinoma involving the right ovary, right fallopian tube was uninvolved.  She was then referred to Dr. Darryn Pires with GYN oncology in [place], [place].  On 7/16/2021, she underwent [facility] hereditary cancer test, this was  negative for pathogenic mutations.  Her [facility] breast cancer risk assessment was 13.9% (general population was 13.2%, no significant increased risk compared to general population).  She underwent an endometrial biopsy on 8/18/2021 that revealed FIGO grade 1 endometrioid endometrial adenocarcinoma.  She then underwent a robotic hysterectomy, left oophorectomy, bilateral pelvic sentinel node dissection with partial omentectomy and peritoneal biopsies of cul-de-sac nodules.  Pathology revealed endometrioid endometrial adenocarcinoma, FIGO 1 with less than 50% invasion.  Margins were negative, lymph nodes were negative, cul-de-sac nodules were positive for metastatic endometrial carcinoma.  Originally, it was discussed that the cul-de-sac lesions were from her ovarian cancer, this would stage her right ovarian cancer as a pT2b, N0, M0 stage IIB and would stage her endometrial cancer as a pT1a, N0, M0 stage IA.  Per an addendum on 9/17/2021, further discussion suggested a pT3a, N0, M0 stage IIIA endometrial cancer.  Patient was seen by Dr. Velazquez on 9/23/2021, plan was to treat with adjuvant carbo Taxol for 6 cycles.  Due to the fact that the patient lives in [place], [place], it was decided to refer to an oncologist in [place], [place] as this was closer to home.  I initially saw the patient on 10/18/2021 at that visit, she stated to overall be feeling well.  She did have some numbness and tingling, also some depression and anxiety, but had no other major issues to discuss.  Patient started her treatment on 10/27/2021, completed on 2/16/2022.  Surveillance CT scans of the chest, abdomen, and pelvis started on 4/11/2022, these showed no evidence of disease. Most recently done on 08/14/2023 showing interim increase in size of the small nodule at the right suprahilar region, previously measuring 3-4 mm, now measuring 6-7 mm. CT scan of the chest, abdomen, and pelvis done on 11/22/2023 Enlarging right apical  lung nodule now measuring 1.1 x 1.0 cm.  There was a new 1.0 cm soft tissue nodule in the fat and musculature of the left lateral lower abdominal wall.    PET/CT scan done on 12/06/2023 showed hypermetabolic 1.0 cm solid right upper lobe nodule with 2 hypermetabolic lymph nodes, 1 in the right axilla and 1 in the portacaval system.  There was a 1.1 cm nodule in the left lower anterior abdominal wall that was also hypermetabolic.    Right axillary lymph node done on 12/11/2023 showed benign findings with no evidence of malignancy.  There was still concern that malignancy was present, patient ended up at [facility].    Left lower abdominal wall nodule biopsy done on 01/25/2024 at [facility] revealed fragments of low-grade endometrioid adenocarcinoma.    She then underwent lung biopsy of the right upper lobe lung nodule at [facility] on 02/20/2024, this returned positive for metastatic endometrial carcinoma.    Started carboplatin and paclitaxel on 03/13/2024.    Interval History:   Patient presents to clinic for scheduled follow up appointment. She started treatment for recurrent malignancy on 03/13/2024.  She is feeling well overall.   She did have a significant reaction to carboplatin with cycle 3, and received carbo desensitization with cycles 4 and 5.  She tolerated these cycles much better.  She states that the IV fluids following last cycle helped her tremendously.  They helped her to bounced back faster.  She does have some numbness/tingling in her hands/feet but this remains mild and intermittent.    Past Medical History:   Diagnosis Date    CVA (cerebral vascular accident)     Endometrial cancer 08/18/2021    Malignant neoplasm of right ovary 06/08/2021      Past Surgical History:   Procedure Laterality Date    ADENOIDECTOMY      CHOLECYSTECTOMY      HYSTERECTOMY      SALPINGOOPHORECTOMY      TONSILLECTOMY       Social History     Socioeconomic History    Marital status: Single   Tobacco Use    Smoking  status: Never    Smokeless tobacco: Never   Substance and Sexual Activity    Alcohol use: Not Currently     Comment: 1-2 times per month    Drug use: Never    Sexual activity: Not Currently     Birth control/protection: Abstinence      Family History   Problem Relation Name Age of Onset    Diabetes Mother Aida Clements     Hypertension Mother Aida Clements     Depression Mother Aida Clements     Depression Father Cristobal Clements     Diabetes Father Cristobal Clements     Early death Father Cristobal Clements         Heart failure    Stroke Father Cristobal Clements     Depression Sister Radha Clements-Quan     Depression Sister Haily Clements     Diabetes Sister Haily Clements     COPD Maternal Grandmother Sudha Rojas         CHF and COPD    Colon cancer Maternal Grandfather Denilson Rojas 60    Prostate cancer Maternal Grandfather Denilson Rojas 60    Stomach cancer Maternal Great-Grandmother        Review of patient's allergies indicates:   Allergen Reactions    Carboplatin Itching, Palpitations and Shortness Of Breath      Review of Systems   Constitutional:  Negative for appetite change and unexpected weight change.   HENT:  Negative for mouth sores.    Eyes:  Negative for visual disturbance.   Respiratory:  Positive for cough. Negative for shortness of breath.    Cardiovascular:  Negative for chest pain.   Gastrointestinal:  Negative for abdominal pain and diarrhea.   Genitourinary:  Negative for frequency.   Musculoskeletal:  Negative for back pain.   Integumentary:  Negative for rash.   Neurological:  Negative for headaches.   Hematological:  Negative for adenopathy.   Psychiatric/Behavioral:  The patient is not nervous/anxious.          Objective:      Physical Exam  Vitals reviewed.   Constitutional:       General: She is not in acute distress.     Appearance: Normal appearance.   HENT:      Head: Normocephalic and atraumatic.      Nose: Nose normal.      Mouth/Throat:      Mouth: Mucous membranes are moist.    Eyes:      Extraocular Movements: Extraocular movements intact.      Conjunctiva/sclera: Conjunctivae normal.   Cardiovascular:      Rate and Rhythm: Normal rate and regular rhythm.      Pulses: Normal pulses.      Heart sounds: Normal heart sounds.   Pulmonary:      Effort: Pulmonary effort is normal.      Breath sounds: Normal breath sounds.   Musculoskeletal:         General: No swelling. Normal range of motion.      Cervical back: Normal range of motion and neck supple.      Right lower leg: No edema.      Left lower leg: No edema.   Skin:     General: Skin is warm and dry.   Neurological:      General: No focal deficit present.      Mental Status: She is alert and oriented to person, place, and time. Mental status is at baseline.   Psychiatric:         Mood and Affect: Mood normal.         Behavior: Behavior normal.         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/2021 with surgery (cul-de-sac nodules found during completion hysterectomy and salpingo-oophorectomy)  pT1a, N0, M0 stage IA endometrial cancer diagnosed 8/18/2021   Recurrent endometrial cancer of a left abdominal wall mass and right lung as of January 2024 in February 2024 respectively     **An addendum to the pathology report from 9/7/2021 internal 9/17/2021 states that the pathologist and the submitting physician's physician assistant had a discussion, and the possibility that the ovary is a metastatic lesion from the endometrium was discussed with a metastasis to the ovary being favored by the clinician.  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        Plan:     Patient completed 6 cycles of adjuvant carboplatin and paclitaxel on 02/16/2022.     CT scan on 11/22/2023 showed increasing size of the right apical nodule, now measuring 1.0 x 1.1 cm.  There was also a new 1.0  cm nodule in the left abdominal wall.    PET/CT scan done on 12/06/2023 shows hypermetabolic right lung nodule, left abdominal wall nodule, right axillary lymph node and portacaval lymph node.    Right axillary lymph node done on 12/11/2023 benign    Left lower abdominal wall nodule biopsy done on 01/25/2024 at [facility] showed recurrent endometrial cancer.    The lung lesion was positive on 02/20/2024 for metastatic endometrial carcinoma.  The patient discussed with their gyn Oncology team possible treatment options, they recommended systemic therapy with carboplatin and paclitaxel.  The patient would like to treat with systemic therapy.    Recent CT scans at [facility] on 05/09/2024 showed interval decrease in the right upper lobe pulmonary metastasis which is now subcentimeter in size with no new disease seen in the chest, abdomen or pelvis.  There recommendation was to continue with carbo Taxol for 3 more cycles.    Continue carboplatin and paclitaxel-- Started on 03/13/2024.     Will need to desensitize her due to significant carbo reaction. This was done in [place] for cycle 4 and 5, we will continue with treating her this way for cycle 6.    Protonix and Pepcid for heartburn, tums PRN    Continue Zofran or compazine as needed for nausea  Recommended she use a stool softener 1-2 times daily and lactulose as needed.    We will have her set up for IV fluids on Thursday and Friday as well as Monday Wednesday Friday of next week.  She knows to let us know if she needs fluids beyond that.-we will set this up in [place]    She is scheduled for repeat scans at [facility] on 07/18/24    Return to clinic for TD visit with labs    Labs: CBC, CMP, mag, UPT, and      All questions were answered to the best of my ability and she understands the plan moving forward.      SILVIA Raymundo

## 2024-06-26 ENCOUNTER — INFUSION (OUTPATIENT)
Dept: INFUSION THERAPY | Facility: HOSPITAL | Age: 39
End: 2024-06-26
Attending: INTERNAL MEDICINE
Payer: COMMERCIAL

## 2024-06-26 VITALS
OXYGEN SATURATION: 97 % | RESPIRATION RATE: 16 BRPM | WEIGHT: 255.88 LBS | HEIGHT: 62 IN | SYSTOLIC BLOOD PRESSURE: 124 MMHG | DIASTOLIC BLOOD PRESSURE: 83 MMHG | TEMPERATURE: 98 F | HEART RATE: 88 BPM | BODY MASS INDEX: 47.09 KG/M2

## 2024-06-26 DIAGNOSIS — E86.0 DEHYDRATION: Primary | ICD-10-CM

## 2024-06-26 DIAGNOSIS — C54.1 ENDOMETRIAL CANCER: ICD-10-CM

## 2024-06-26 DIAGNOSIS — C56.1 MALIGNANT NEOPLASM OF RIGHT OVARY: ICD-10-CM

## 2024-06-26 PROCEDURE — 96413 CHEMO IV INFUSION 1 HR: CPT

## 2024-06-26 PROCEDURE — 96367 TX/PROPH/DG ADDL SEQ IV INF: CPT

## 2024-06-26 PROCEDURE — 96375 TX/PRO/DX INJ NEW DRUG ADDON: CPT

## 2024-06-26 PROCEDURE — 25000003 PHARM REV CODE 250: Performed by: NURSE PRACTITIONER

## 2024-06-26 PROCEDURE — 96417 CHEMO IV INFUS EACH ADDL SEQ: CPT

## 2024-06-26 PROCEDURE — 96376 TX/PRO/DX INJ SAME DRUG ADON: CPT

## 2024-06-26 PROCEDURE — 63600175 PHARM REV CODE 636 W HCPCS: Performed by: NURSE PRACTITIONER

## 2024-06-26 PROCEDURE — 96415 CHEMO IV INFUSION ADDL HR: CPT

## 2024-06-26 RX ORDER — FAMOTIDINE 10 MG/ML
20 INJECTION INTRAVENOUS
Status: COMPLETED | OUTPATIENT
Start: 2024-06-26 | End: 2024-06-26

## 2024-06-26 RX ORDER — DIPHENHYDRAMINE HYDROCHLORIDE 50 MG/ML
25 INJECTION INTRAMUSCULAR; INTRAVENOUS
Status: COMPLETED | OUTPATIENT
Start: 2024-06-26 | End: 2024-06-26

## 2024-06-26 RX ORDER — HEPARIN 100 UNIT/ML
500 SYRINGE INTRAVENOUS
Status: DISCONTINUED | OUTPATIENT
Start: 2024-06-26 | End: 2024-06-27 | Stop reason: HOSPADM

## 2024-06-26 RX ORDER — MONTELUKAST SODIUM 10 MG/1
10 TABLET ORAL
Status: COMPLETED | OUTPATIENT
Start: 2024-06-26 | End: 2024-06-26

## 2024-06-26 RX ORDER — PROCHLORPERAZINE EDISYLATE 5 MG/ML
10 INJECTION INTRAMUSCULAR; INTRAVENOUS ONCE AS NEEDED
Status: DISCONTINUED | OUTPATIENT
Start: 2024-06-26 | End: 2024-06-27 | Stop reason: HOSPADM

## 2024-06-26 RX ORDER — DIPHENHYDRAMINE HYDROCHLORIDE 50 MG/ML
50 INJECTION INTRAMUSCULAR; INTRAVENOUS ONCE AS NEEDED
Status: COMPLETED | OUTPATIENT
Start: 2024-06-26 | End: 2024-06-26

## 2024-06-26 RX ORDER — SODIUM CHLORIDE 0.9 % (FLUSH) 0.9 %
10 SYRINGE (ML) INJECTION
Status: DISCONTINUED | OUTPATIENT
Start: 2024-06-26 | End: 2024-06-27 | Stop reason: HOSPADM

## 2024-06-26 RX ORDER — EPINEPHRINE 0.3 MG/.3ML
0.3 INJECTION SUBCUTANEOUS ONCE AS NEEDED
Status: DISCONTINUED | OUTPATIENT
Start: 2024-06-26 | End: 2024-06-27 | Stop reason: HOSPADM

## 2024-06-26 RX ADMIN — FAMOTIDINE 20 MG: 10 INJECTION INTRAVENOUS at 12:06

## 2024-06-26 RX ADMIN — DIPHENHYDRAMINE HYDROCHLORIDE 25 MG: 50 INJECTION INTRAMUSCULAR; INTRAVENOUS at 03:06

## 2024-06-26 RX ADMIN — CARBOPLATIN 5 MG: 10 INJECTION, SOLUTION INTRAVENOUS at 01:06

## 2024-06-26 RX ADMIN — APREPITANT 130 MG: 130 INJECTION, EMULSION INTRAVENOUS at 01:06

## 2024-06-26 RX ADMIN — SODIUM CHLORIDE: 9 INJECTION, SOLUTION INTRAVENOUS at 08:06

## 2024-06-26 RX ADMIN — DEXAMETHASONE SODIUM PHOSPHATE 0.25 MG: 4 INJECTION, SOLUTION INTRA-ARTICULAR; INTRALESIONAL; INTRAMUSCULAR; INTRAVENOUS; SOFT TISSUE at 12:06

## 2024-06-26 RX ADMIN — CARBOPLATIN 10 MG: 10 INJECTION, SOLUTION INTRAVENOUS at 01:06

## 2024-06-26 RX ADMIN — CARBOPLATIN 90 MG: 10 INJECTION, SOLUTION INTRAVENOUS at 02:06

## 2024-06-26 RX ADMIN — DEXAMETHASONE SODIUM PHOSPHATE 10 MG: 4 INJECTION INTRA-ARTICULAR; INTRALESIONAL; INTRAMUSCULAR; INTRAVENOUS; SOFT TISSUE at 08:06

## 2024-06-26 RX ADMIN — DIPHENHYDRAMINE HYDROCHLORIDE 25 MG: 50 INJECTION INTRAMUSCULAR; INTRAVENOUS at 12:06

## 2024-06-26 RX ADMIN — PACLITAXEL 384 MG: 6 INJECTION, SOLUTION INTRAVENOUS at 09:06

## 2024-06-26 RX ADMIN — MONTELUKAST SODIUM 10 MG: 10 TABLET, COATED ORAL at 12:06

## 2024-06-26 RX ADMIN — FAMOTIDINE 20 MG: 10 INJECTION INTRAVENOUS at 09:06

## 2024-06-26 RX ADMIN — DIPHENHYDRAMINE HYDROCHLORIDE 25 MG: 50 INJECTION INTRAMUSCULAR; INTRAVENOUS at 08:06

## 2024-06-26 RX ADMIN — HEPARIN 500 UNITS: 100 SYRINGE at 04:06

## 2024-06-26 NOTE — PLAN OF CARE
Pt here for c6 taxol, carboplatin desensitization protocol. Pt kell taxol well, had trouble at the end of third dose of carbo. Back pain, gum pain and hands red and itchy. Stopped infusion.  Ivfs started. Gave 25mg benadryl. Everything got better. Yolanda ARCEO came and spoke with patient will not give the 4th and largest dose of carbo. Worried about more problems even with all the premeds given. Pt verbalized understanding.     Finished ivfs and pt noted she felt good to drive home. Left [facility] accessed as she will get ivfs in [place] the next couple days.

## 2024-06-27 ENCOUNTER — INFUSION (OUTPATIENT)
Dept: INFUSION THERAPY | Facility: HOSPITAL | Age: 39
End: 2024-06-27
Attending: FAMILY MEDICINE
Payer: COMMERCIAL

## 2024-06-27 VITALS
TEMPERATURE: 98 F | HEART RATE: 99 BPM | OXYGEN SATURATION: 97 % | DIASTOLIC BLOOD PRESSURE: 70 MMHG | SYSTOLIC BLOOD PRESSURE: 119 MMHG

## 2024-06-27 DIAGNOSIS — C56.1 MALIGNANT NEOPLASM OF RIGHT OVARY: ICD-10-CM

## 2024-06-27 DIAGNOSIS — E86.0 DEHYDRATION: ICD-10-CM

## 2024-06-27 DIAGNOSIS — C54.1 ENDOMETRIAL CANCER: Primary | ICD-10-CM

## 2024-06-27 PROCEDURE — 96361 HYDRATE IV INFUSION ADD-ON: CPT

## 2024-06-27 PROCEDURE — 25000003 PHARM REV CODE 250: Performed by: NURSE PRACTITIONER

## 2024-06-27 PROCEDURE — 96360 HYDRATION IV INFUSION INIT: CPT

## 2024-06-27 RX ORDER — HEPARIN 100 UNIT/ML
500 SYRINGE INTRAVENOUS
Status: CANCELLED | OUTPATIENT
Start: 2024-06-27

## 2024-06-27 RX ORDER — SODIUM CHLORIDE 0.9 % (FLUSH) 0.9 %
10 SYRINGE (ML) INJECTION
Status: CANCELLED | OUTPATIENT
Start: 2024-06-27

## 2024-06-27 RX ADMIN — SODIUM CHLORIDE 1000 ML: 9 INJECTION, SOLUTION INTRAVENOUS at 09:06

## 2024-06-28 ENCOUNTER — INFUSION (OUTPATIENT)
Dept: INFUSION THERAPY | Facility: HOSPITAL | Age: 39
End: 2024-06-28
Attending: FAMILY MEDICINE
Payer: COMMERCIAL

## 2024-06-28 VITALS — DIASTOLIC BLOOD PRESSURE: 77 MMHG | TEMPERATURE: 98 F | HEART RATE: 83 BPM | SYSTOLIC BLOOD PRESSURE: 116 MMHG

## 2024-06-28 DIAGNOSIS — C54.1 ENDOMETRIAL CANCER: Primary | ICD-10-CM

## 2024-06-28 DIAGNOSIS — E86.0 DEHYDRATION: ICD-10-CM

## 2024-06-28 DIAGNOSIS — K59.00 CONSTIPATION, UNSPECIFIED CONSTIPATION TYPE: Primary | ICD-10-CM

## 2024-06-28 DIAGNOSIS — C56.1 MALIGNANT NEOPLASM OF RIGHT OVARY: ICD-10-CM

## 2024-06-28 PROCEDURE — 96360 HYDRATION IV INFUSION INIT: CPT

## 2024-06-28 PROCEDURE — 25000003 PHARM REV CODE 250: Performed by: NURSE PRACTITIONER

## 2024-06-28 PROCEDURE — 96361 HYDRATE IV INFUSION ADD-ON: CPT

## 2024-06-28 RX ORDER — HEPARIN 100 UNIT/ML
500 SYRINGE INTRAVENOUS
OUTPATIENT
Start: 2024-06-28

## 2024-06-28 RX ORDER — LACTULOSE 10 G/15ML
30 SOLUTION ORAL; RECTAL 2 TIMES DAILY
Qty: 473 ML | Refills: 1 | Status: SHIPPED | OUTPATIENT
Start: 2024-06-28

## 2024-06-28 RX ORDER — SODIUM CHLORIDE 0.9 % (FLUSH) 0.9 %
10 SYRINGE (ML) INJECTION
OUTPATIENT
Start: 2024-06-28

## 2024-06-28 RX ADMIN — SODIUM CHLORIDE 1000 ML: 9 INJECTION, SOLUTION INTRAVENOUS at 07:06

## 2024-07-01 ENCOUNTER — INFUSION (OUTPATIENT)
Dept: INFUSION THERAPY | Facility: HOSPITAL | Age: 39
End: 2024-07-01
Attending: FAMILY MEDICINE
Payer: COMMERCIAL

## 2024-07-01 VITALS
BODY MASS INDEX: 46.78 KG/M2 | TEMPERATURE: 99 F | HEART RATE: 112 BPM | HEIGHT: 62 IN | DIASTOLIC BLOOD PRESSURE: 83 MMHG | WEIGHT: 254.19 LBS | SYSTOLIC BLOOD PRESSURE: 115 MMHG

## 2024-07-01 DIAGNOSIS — C54.1 ENDOMETRIAL CANCER: Primary | ICD-10-CM

## 2024-07-01 DIAGNOSIS — E86.0 DEHYDRATION: ICD-10-CM

## 2024-07-01 DIAGNOSIS — C56.1 MALIGNANT NEOPLASM OF RIGHT OVARY: ICD-10-CM

## 2024-07-01 PROCEDURE — 96360 HYDRATION IV INFUSION INIT: CPT

## 2024-07-01 PROCEDURE — 25000003 PHARM REV CODE 250: Performed by: NURSE PRACTITIONER

## 2024-07-01 PROCEDURE — 96361 HYDRATE IV INFUSION ADD-ON: CPT

## 2024-07-01 RX ORDER — SODIUM CHLORIDE 0.9 % (FLUSH) 0.9 %
10 SYRINGE (ML) INJECTION
Status: CANCELLED | OUTPATIENT
Start: 2024-07-01

## 2024-07-01 RX ORDER — HEPARIN 100 UNIT/ML
500 SYRINGE INTRAVENOUS
Status: CANCELLED | OUTPATIENT
Start: 2024-07-01

## 2024-07-01 RX ADMIN — SODIUM CHLORIDE 1000 ML: 9 INJECTION, SOLUTION INTRAVENOUS at 09:07

## 2024-07-03 ENCOUNTER — INFUSION (OUTPATIENT)
Dept: INFUSION THERAPY | Facility: HOSPITAL | Age: 39
End: 2024-07-03
Attending: FAMILY MEDICINE
Payer: COMMERCIAL

## 2024-07-03 VITALS
WEIGHT: 254.63 LBS | TEMPERATURE: 98 F | DIASTOLIC BLOOD PRESSURE: 80 MMHG | SYSTOLIC BLOOD PRESSURE: 123 MMHG | HEIGHT: 62 IN | BODY MASS INDEX: 46.86 KG/M2 | HEART RATE: 89 BPM

## 2024-07-03 DIAGNOSIS — R12 HEART BURN: ICD-10-CM

## 2024-07-03 DIAGNOSIS — C54.1 ENDOMETRIAL CANCER: Primary | ICD-10-CM

## 2024-07-03 DIAGNOSIS — C54.1 ENDOMETRIAL CANCER: ICD-10-CM

## 2024-07-03 DIAGNOSIS — E86.0 DEHYDRATION: ICD-10-CM

## 2024-07-03 DIAGNOSIS — C56.1 MALIGNANT NEOPLASM OF RIGHT OVARY: ICD-10-CM

## 2024-07-03 PROCEDURE — 96361 HYDRATE IV INFUSION ADD-ON: CPT

## 2024-07-03 PROCEDURE — 96360 HYDRATION IV INFUSION INIT: CPT

## 2024-07-03 PROCEDURE — 25000003 PHARM REV CODE 250: Performed by: NURSE PRACTITIONER

## 2024-07-03 RX ORDER — PANTOPRAZOLE SODIUM 40 MG/1
40 TABLET, DELAYED RELEASE ORAL
Qty: 30 TABLET | Refills: 3 | Status: SHIPPED | OUTPATIENT
Start: 2024-07-03

## 2024-07-03 RX ORDER — HEPARIN 100 UNIT/ML
500 SYRINGE INTRAVENOUS
OUTPATIENT
Start: 2024-07-03

## 2024-07-03 RX ORDER — SODIUM CHLORIDE 0.9 % (FLUSH) 0.9 %
10 SYRINGE (ML) INJECTION
OUTPATIENT
Start: 2024-07-03

## 2024-07-03 RX ADMIN — SODIUM CHLORIDE 1000 ML: 9 INJECTION, SOLUTION INTRAVENOUS at 10:07

## 2024-07-20 ENCOUNTER — PATIENT MESSAGE (OUTPATIENT)
Dept: HEMATOLOGY/ONCOLOGY | Facility: CLINIC | Age: 39
End: 2024-07-20
Payer: COMMERCIAL

## 2024-07-23 ENCOUNTER — OFFICE VISIT (OUTPATIENT)
Dept: HEMATOLOGY/ONCOLOGY | Facility: CLINIC | Age: 39
End: 2024-07-23
Payer: COMMERCIAL

## 2024-07-23 ENCOUNTER — LAB VISIT (OUTPATIENT)
Dept: LAB | Facility: HOSPITAL | Age: 39
End: 2024-07-23
Attending: INTERNAL MEDICINE
Payer: COMMERCIAL

## 2024-07-23 VITALS
SYSTOLIC BLOOD PRESSURE: 122 MMHG | DIASTOLIC BLOOD PRESSURE: 80 MMHG | BODY MASS INDEX: 47.52 KG/M2 | HEART RATE: 96 BPM | OXYGEN SATURATION: 96 % | HEIGHT: 62 IN | RESPIRATION RATE: 16 BRPM | WEIGHT: 258.25 LBS

## 2024-07-23 DIAGNOSIS — C56.1 MALIGNANT NEOPLASM OF RIGHT OVARY: ICD-10-CM

## 2024-07-23 DIAGNOSIS — Z79.811 USE OF AROMATASE INHIBITORS: ICD-10-CM

## 2024-07-23 DIAGNOSIS — T45.1X5A CHEMOTHERAPY-INDUCED PERIPHERAL NEUROPATHY: ICD-10-CM

## 2024-07-23 DIAGNOSIS — C54.1 ENDOMETRIAL CANCER: ICD-10-CM

## 2024-07-23 DIAGNOSIS — C54.1 ENDOMETRIAL CANCER: Primary | ICD-10-CM

## 2024-07-23 DIAGNOSIS — G62.0 CHEMOTHERAPY-INDUCED PERIPHERAL NEUROPATHY: ICD-10-CM

## 2024-07-23 DIAGNOSIS — Z79.899 ON ANTINEOPLASTIC CHEMOTHERAPY: ICD-10-CM

## 2024-07-23 DIAGNOSIS — E86.0 DEHYDRATION: ICD-10-CM

## 2024-07-23 LAB
ALBUMIN SERPL-MCNC: 3.8 G/DL (ref 3.5–5)
ALBUMIN/GLOB SERPL: 1.3 RATIO (ref 1.1–2)
ALP SERPL-CCNC: 101 UNIT/L (ref 40–150)
ALT SERPL-CCNC: 51 UNIT/L (ref 0–55)
ANION GAP SERPL CALC-SCNC: 7 MEQ/L
AST SERPL-CCNC: 28 UNIT/L (ref 5–34)
BASOPHILS # BLD AUTO: 0.03 X10(3)/MCL
BASOPHILS NFR BLD AUTO: 0.4 %
BILIRUB SERPL-MCNC: 0.4 MG/DL
BUN SERPL-MCNC: 10.4 MG/DL (ref 7–18.7)
CALCIUM SERPL-MCNC: 9.5 MG/DL (ref 8.4–10.2)
CANCER AG125 SERPL-ACNC: 10.4 UNIT/ML (ref 0–35)
CHLORIDE SERPL-SCNC: 105 MMOL/L (ref 98–107)
CO2 SERPL-SCNC: 30 MMOL/L (ref 22–29)
CREAT SERPL-MCNC: 0.74 MG/DL (ref 0.55–1.02)
CREAT/UREA NIT SERPL: 14
EOSINOPHIL # BLD AUTO: 0.07 X10(3)/MCL (ref 0–0.9)
EOSINOPHIL NFR BLD AUTO: 0.9 %
ERYTHROCYTE [DISTWIDTH] IN BLOOD BY AUTOMATED COUNT: 13.3 % (ref 11.5–17)
GFR SERPLBLD CREATININE-BSD FMLA CKD-EPI: >60 ML/MIN/1.73/M2
GLOBULIN SER-MCNC: 2.9 GM/DL (ref 2.4–3.5)
GLUCOSE SERPL-MCNC: 179 MG/DL (ref 74–100)
HCT VFR BLD AUTO: 36.6 % (ref 37–47)
HGB BLD-MCNC: 12 G/DL (ref 12–16)
IMM GRANULOCYTES # BLD AUTO: 0.03 X10(3)/MCL (ref 0–0.04)
IMM GRANULOCYTES NFR BLD AUTO: 0.4 %
LYMPHOCYTES # BLD AUTO: 1.38 X10(3)/MCL (ref 0.6–4.6)
LYMPHOCYTES NFR BLD AUTO: 17.4 %
MAGNESIUM SERPL-MCNC: 2 MG/DL (ref 1.6–2.6)
MCH RBC QN AUTO: 31.8 PG (ref 27–31)
MCHC RBC AUTO-ENTMCNC: 32.8 G/DL (ref 33–36)
MCV RBC AUTO: 97.1 FL (ref 80–94)
MONOCYTES # BLD AUTO: 0.38 X10(3)/MCL (ref 0.1–1.3)
MONOCYTES NFR BLD AUTO: 4.8 %
NEUTROPHILS # BLD AUTO: 6.04 X10(3)/MCL (ref 2.1–9.2)
NEUTROPHILS NFR BLD AUTO: 76.1 %
PLATELET # BLD AUTO: 297 X10(3)/MCL (ref 130–400)
PMV BLD AUTO: 9.3 FL (ref 7.4–10.4)
POTASSIUM SERPL-SCNC: 4.4 MMOL/L (ref 3.5–5.1)
PROT SERPL-MCNC: 6.7 GM/DL (ref 6.4–8.3)
RBC # BLD AUTO: 3.77 X10(6)/MCL (ref 4.2–5.4)
SODIUM SERPL-SCNC: 142 MMOL/L (ref 136–145)
WBC # BLD AUTO: 7.93 X10(3)/MCL (ref 4.5–11.5)

## 2024-07-23 PROCEDURE — 3008F BODY MASS INDEX DOCD: CPT | Mod: CPTII,S$GLB,, | Performed by: NURSE PRACTITIONER

## 2024-07-23 PROCEDURE — 3079F DIAST BP 80-89 MM HG: CPT | Mod: CPTII,S$GLB,, | Performed by: NURSE PRACTITIONER

## 2024-07-23 PROCEDURE — 1160F RVW MEDS BY RX/DR IN RCRD: CPT | Mod: CPTII,S$GLB,, | Performed by: NURSE PRACTITIONER

## 2024-07-23 PROCEDURE — 1159F MED LIST DOCD IN RCRD: CPT | Mod: CPTII,S$GLB,, | Performed by: NURSE PRACTITIONER

## 2024-07-23 PROCEDURE — 99214 OFFICE O/P EST MOD 30 MIN: CPT | Mod: S$GLB,,, | Performed by: NURSE PRACTITIONER

## 2024-07-23 PROCEDURE — 36415 COLL VENOUS BLD VENIPUNCTURE: CPT

## 2024-07-23 PROCEDURE — 3074F SYST BP LT 130 MM HG: CPT | Mod: CPTII,S$GLB,, | Performed by: NURSE PRACTITIONER

## 2024-07-23 PROCEDURE — 99999 PR PBB SHADOW E&M-EST. PATIENT-LVL IV: CPT | Mod: PBBFAC,,, | Performed by: INTERNAL MEDICINE

## 2024-07-23 PROCEDURE — 85025 COMPLETE CBC W/AUTO DIFF WBC: CPT

## 2024-07-23 PROCEDURE — 83735 ASSAY OF MAGNESIUM: CPT

## 2024-07-23 PROCEDURE — 80053 COMPREHEN METABOLIC PANEL: CPT

## 2024-07-23 PROCEDURE — 86304 IMMUNOASSAY TUMOR CA 125: CPT

## 2024-07-23 RX ORDER — LETROZOLE 2.5 MG/1
2.5 TABLET, FILM COATED ORAL
COMMUNITY
Start: 2024-07-19

## 2024-07-23 NOTE — PROGRESS NOTES
Subjective:       Patient ID: Ruben Clements is a 39 y.o. female.    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     **An addendum to the pathology report from 9/7/2021 internal 9/17/2021 states that the pathologist and the submitting physician's physician assistant had a discussion, and the possibility that the ovary is a metastatic lesion from the endometrium was discussed with a metastasis to the ovary being favored by the clinician.  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     Current Treatment:   Carboplatin and paclitaxel every 3 weeks started on 03/13/2024 (since recurrence was greater than a year since her last carbo/taxol dose). Cycle 6 received on 06/26/2024    Treatment History:  Adjuvant carboplatin and paclitaxel given every 3 weeks for 6 cycles, started on 10/27/2021.  Completed on 2/16/2022.      HPI:  Patient who was having irregular menses, pelvic pain, abdominal pain along with nausea and vomiting.  She presented to see her OB/GYN, imaging was done that showed ovarian cyst.  She underwent a right oophorectomy on 6/8/2021, pathology revealed a 15 cm well-differentiated endometrioid adenocarcinoma involving the right ovary, right fallopian tube was uninvolved.  She was then referred to Dr. Darryn Pires with GYN oncology in [place], [place].  On 7/16/2021, she underwent [facility] hereditary cancer test, this was negative for pathogenic mutations.  Her [facility] breast cancer  risk assessment was 13.9% (general population was 13.2%, no significant increased risk compared to general population).  She underwent an endometrial biopsy on 8/18/2021 that revealed FIGO grade 1 endometrioid endometrial adenocarcinoma.  She then underwent a robotic hysterectomy, left oophorectomy, bilateral pelvic sentinel node dissection with partial omentectomy and peritoneal biopsies of cul-de-sac nodules.  Pathology revealed endometrioid endometrial adenocarcinoma, FIGO 1 with less than 50% invasion.  Margins were negative, lymph nodes were negative, cul-de-sac nodules were positive for metastatic endometrial carcinoma.  Originally, it was discussed that the cul-de-sac lesions were from her ovarian cancer, this would stage her right ovarian cancer as a pT2b, N0, M0 stage IIB and would stage her endometrial cancer as a pT1a, N0, M0 stage IA.  Per an addendum on 9/17/2021, further discussion suggested a pT3a, N0, M0 stage IIIA endometrial cancer.  Patient was seen by Dr. Velazquez on 9/23/2021, plan was to treat with adjuvant carbo Taxol for 6 cycles.  Due to the fact that the patient lives in [place], [place], it was decided to refer to an oncologist in [place], [place] as this was closer to home.  I initially saw the patient on 10/18/2021 at that visit, she stated to overall be feeling well.  She did have some numbness and tingling, also some depression and anxiety, but had no other major issues to discuss.  Patient started her treatment on 10/27/2021, completed on 2/16/2022.  Surveillance CT scans of the chest, abdomen, and pelvis started on 4/11/2022, these showed no evidence of disease. Most recently done on 08/14/2023 showing interim increase in size of the small nodule at the right suprahilar region, previously measuring 3-4 mm, now measuring 6-7 mm. CT scan of the chest, abdomen, and pelvis done on 11/22/2023 Enlarging right apical lung nodule now measuring 1.1 x 1.0 cm.  There was a new 1.0 cm soft  tissue nodule in the fat and musculature of the left lateral lower abdominal wall.    PET/CT scan done on 12/06/2023 showed hypermetabolic 1.0 cm solid right upper lobe nodule with 2 hypermetabolic lymph nodes, 1 in the right axilla and 1 in the portacaval system.  There was a 1.1 cm nodule in the left lower anterior abdominal wall that was also hypermetabolic.    Right axillary lymph node done on 12/11/2023 showed benign findings with no evidence of malignancy.  There was still concern that malignancy was present, patient ended up at [facility].    Left lower abdominal wall nodule biopsy done on 01/25/2024 at [facility] revealed fragments of low-grade endometrioid adenocarcinoma.    She then underwent lung biopsy of the right upper lobe lung nodule at [facility] on 02/20/2024, this returned positive for metastatic endometrial carcinoma.    Started carboplatin and paclitaxel on 03/13/2024. Cycle 6 received on 06/26/2024    CT scans of the chest, abdomen, and pelvis done on 07/18/2024 @ [facility] Resolution of a right upper lobe pulmonary metastasis. A presumed metastatic deposit in the left lateral abdominal wall has also become smaller. Hepatic steatosis and hepatomegaly.     Interval History:   Patient presents to clinic for scheduled follow up appointment. She started treatment for recurrent malignancy on 03/13/2024 and completed 6 cycles.  She recently had repeat CT scans at [facility] that showed a positive response to therapy.  She was started on letrozole which she took her 1st dose today.  She also has an upcoming appointment at [facility] with Radiation Oncology for the abdominal wall lesion.  She is feeling well overall.     Past Medical History:   Diagnosis Date    CVA (cerebral vascular accident)     Endometrial cancer 08/18/2021    Malignant neoplasm of right ovary 06/08/2021      Past Surgical History:   Procedure Laterality Date    ADENOIDECTOMY      CHOLECYSTECTOMY      HYSTERECTOMY       SALPINGOOPHORECTOMY      TONSILLECTOMY       Social History     Socioeconomic History    Marital status: Single   Tobacco Use    Smoking status: Never    Smokeless tobacco: Never   Substance and Sexual Activity    Alcohol use: Not Currently     Comment: 1-2 times per month    Drug use: Never    Sexual activity: Not Currently     Birth control/protection: Abstinence      Family History   Problem Relation Name Age of Onset    Diabetes Mother Aida Clements     Hypertension Mother Aida Clements     Depression Mother Aida Clements     Depression Father Cristobal Clements     Diabetes Father Cristobal Clements     Early death Father Cristobal Clements         Heart failure    Stroke Father Cristobal Clements     Depression Sister Radha Clements-Quan     Depression Sister Haily Clements     Diabetes Sister Haily Clements     COPD Maternal Grandmother Sudha Rojas         CHF and COPD    Colon cancer Maternal Grandfather Denilson [place] 60    Prostate cancer Maternal Grandfather Denilson Rojas 60    Stomach cancer Maternal Great-Grandmother        Review of patient's allergies indicates:   Allergen Reactions    Carboplatin Itching, Palpitations and Shortness Of Breath      Review of Systems   Constitutional:  Negative for appetite change and unexpected weight change.   HENT:  Negative for mouth sores.    Eyes:  Negative for visual disturbance.   Respiratory:  Positive for cough. Negative for shortness of breath.    Cardiovascular:  Negative for chest pain.   Gastrointestinal:  Negative for abdominal pain and diarrhea.   Genitourinary:  Negative for frequency.   Musculoskeletal:  Negative for back pain.   Integumentary:  Negative for rash.   Neurological:  Negative for headaches.   Hematological:  Negative for adenopathy.   Psychiatric/Behavioral:  The patient is not nervous/anxious.          Objective:      Physical Exam  Vitals reviewed.   Constitutional:       General: She is not in acute distress.     Appearance: Normal  appearance.   HENT:      Head: Normocephalic and atraumatic.      Nose: Nose normal.      Mouth/Throat:      Mouth: Mucous membranes are moist.   Eyes:      Extraocular Movements: Extraocular movements intact.      Conjunctiva/sclera: Conjunctivae normal.   Cardiovascular:      Rate and Rhythm: Normal rate and regular rhythm.      Pulses: Normal pulses.      Heart sounds: Normal heart sounds.   Pulmonary:      Effort: Pulmonary effort is normal.      Breath sounds: Normal breath sounds.   Musculoskeletal:         General: No swelling. Normal range of motion.      Cervical back: Normal range of motion and neck supple.      Right lower leg: No edema.      Left lower leg: No edema.   Skin:     General: Skin is warm and dry.   Neurological:      General: No focal deficit present.      Mental Status: She is alert and oriented to person, place, and time. Mental status is at baseline.   Psychiatric:         Mood and Affect: Mood normal.         Behavior: Behavior normal.         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/2021 with surgery (cul-de-sac nodules found during completion hysterectomy and salpingo-oophorectomy)  pT1a, N0, M0 stage IA endometrial cancer diagnosed 8/18/2021   Recurrent endometrial cancer of a left abdominal wall mass and right lung as of January 2024 in February 2024 respectively     **An addendum to the pathology report from 9/7/2021 internal 9/17/2021 states that the pathologist and the submitting physician's physician assistant had a discussion, and the possibility that the ovary is a metastatic lesion from the endometrium was discussed with a metastasis to the ovary being favored by the clinician.  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        Plan:     Patient completed 6 cycles of adjuvant carboplatin and paclitaxel on  02/16/2022.     CT scan on 11/22/2023 showed increasing size of the right apical nodule, now measuring 1.0 x 1.1 cm.  There was also a new 1.0 cm nodule in the left abdominal wall.    PET/CT scan done on 12/06/2023 shows hypermetabolic right lung nodule, left abdominal wall nodule, right axillary lymph node and portacaval lymph node.    Right axillary lymph node done on 12/11/2023 benign    Left lower abdominal wall nodule biopsy done on 01/25/2024 at [facility] showed recurrent endometrial cancer.    The lung lesion was positive on 02/20/2024 for metastatic endometrial carcinoma.  The patient discussed with their gyn Oncology team possible treatment options, they recommended systemic therapy with carboplatin and paclitaxel.  The patient would like to treat with systemic therapy.    Recent CT scans at [facility] on 05/09/2024 showed interval decrease in the right upper lobe pulmonary metastasis which is now subcentimeter in size with no new disease seen in the chest, abdomen or pelvis.  There recommendation was to continue with carbo Taxol for 3 more cycles.    Continue carboplatin and paclitaxel-- Started on 03/13/2024. Cycle 6 received on 06/26/2024      Protonix and Pepcid for heartburn, tums PRN    Continue Zofran or compazine as needed for nausea    Recommended she use a stool softener 1-2 times daily and lactulose as needed.    CT scans of the chest, abdomen, and pelvis done on 07/18/2024 @ [facility]   Resolution of a right upper lobe pulmonary metastasis. A presumed metastatic deposit in the left lateral abdominal wall has also become smaller. Hepatic steatosis and hepatomegaly.     [facility] recommendation is for letrozole maintenance given her ER/MT tumor status Also radiation oncology consult for consideration of XRT to the abdominal wall lesion.     Started letrozole on today 07/23/2024.    She will meet with Rad Onc at [facility] on 08/02/2024 but would like to treat locally.  Plan to refer her to   Prellop following her consult at [facility], [place]    Return to clinic in 6 weeks    Labs: CBC, CMP, mag, and      All questions were answered to the best of my ability and she understands the plan moving forward.      JOE Raymundo-C

## 2024-07-23 NOTE — PROGRESS NOTES
Subjective:       Patient ID: Ruben Clements is a 39 y.o. female.    Chief Complaint: No chief complaint on file.      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     **An addendum to the pathology report from 9/7/2021 internal 9/17/2021 states that the pathologist and the submitting physician's physician assistant had a discussion, and the possibility that the ovary is a metastatic lesion from the endometrium was discussed with a metastasis to the ovary being favored by the clinician.  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     Current Treatment:   Carboplatin and paclitaxel every 3 weeks started on 03/13/2024 (since recurrence was greater than a year since her last carbo/taxol dose). Cycle 6 received on 06/26/2024    Treatment History:  Adjuvant carboplatin and paclitaxel given every 3 weeks for 6 cycles, started on 10/27/2021.  Completed on 2/16/2022.      HPI:  Patient who was having irregular menses, pelvic pain, abdominal pain along with nausea and vomiting.  She presented to see her OB/GYN, imaging was done that showed ovarian cyst.  She underwent a right oophorectomy on 6/8/2021, pathology revealed a 15 cm well-differentiated endometrioid adenocarcinoma involving the right ovary, right fallopian tube was uninvolved.  She was then referred to Dr. Darryn Pires with GYN oncology in [place], [place].  On 7/16/2021, she underwent [facility] hereditary cancer test, this was negative for pathogenic mutations.  Her [facility] breast cancer risk  assessment was 13.9% (general population was 13.2%, no significant increased risk compared to general population).  She underwent an endometrial biopsy on 8/18/2021 that revealed FIGO grade 1 endometrioid endometrial adenocarcinoma.  She then underwent a robotic hysterectomy, left oophorectomy, bilateral pelvic sentinel node dissection with partial omentectomy and peritoneal biopsies of cul-de-sac nodules.  Pathology revealed endometrioid endometrial adenocarcinoma, FIGO 1 with less than 50% invasion.  Margins were negative, lymph nodes were negative, cul-de-sac nodules were positive for metastatic endometrial carcinoma.  Originally, it was discussed that the cul-de-sac lesions were from her ovarian cancer, this would stage her right ovarian cancer as a pT2b, N0, M0 stage IIB and would stage her endometrial cancer as a pT1a, N0, M0 stage IA.  Per an addendum on 9/17/2021, further discussion suggested a pT3a, N0, M0 stage IIIA endometrial cancer.  Patient was seen by Dr. Velazquez on 9/23/2021, plan was to treat with adjuvant carbo Taxol for 6 cycles.  Due to the fact that the patient lives in [place], [place], it was decided to refer to an oncologist in [place], [place] as this was closer to home.  I initially saw the patient on 10/18/2021 at that visit, she stated to overall be feeling well.  She did have some numbness and tingling, also some depression and anxiety, but had no other major issues to discuss.  Patient started her treatment on 10/27/2021, completed on 2/16/2022.  Surveillance CT scans of the chest, abdomen, and pelvis started on 4/11/2022, these showed no evidence of disease. Most recently done on 08/14/2023 showing interim increase in size of the small nodule at the right suprahilar region, previously measuring 3-4 mm, now measuring 6-7 mm. CT scan of the chest, abdomen, and pelvis done on 11/22/2023 Enlarging right apical lung nodule now measuring 1.1 x 1.0 cm.  There was a new 1.0 cm soft tissue  nodule in the fat and musculature of the left lateral lower abdominal wall.    PET/CT scan done on 12/06/2023 showed hypermetabolic 1.0 cm solid right upper lobe nodule with 2 hypermetabolic lymph nodes, 1 in the right axilla and 1 in the portacaval system.  There was a 1.1 cm nodule in the left lower anterior abdominal wall that was also hypermetabolic.    Right axillary lymph node done on 12/11/2023 showed benign findings with no evidence of malignancy.  There was still concern that malignancy was present, patient ended up at [facility].    Left lower abdominal wall nodule biopsy done on 01/25/2024 at [facility] revealed fragments of low-grade endometrioid adenocarcinoma.    She then underwent lung biopsy of the right upper lobe lung nodule at [facility] on 02/20/2024, this returned positive for metastatic endometrial carcinoma.    Started carboplatin and paclitaxel on 03/13/2024. Cycle 6 received on 06/26/2024    CT scans of the chest, abdomen, and pelvis done on 07/18/2024 @ [facility] Resolution of a right upper lobe pulmonary metastasis. A presumed metastatic deposit in the left lateral abdominal wall has also become smaller. Hepatic steatosis and hepatomegaly.     Interval History:   Patient presents to clinic for scheduled follow up appointment. She started treatment for recurrent malignancy on 03/13/2024 and completed 6 cycles.  She recently had repeat CT scans at [facility] that showed a positive response to therapy.  She was started on letrozole which she took her 1st dose today.  She also has an upcoming appointment at [facility] with Radiation Oncology for the abdominal wall lesion.  She is feeling well overall.     Past Medical History:   Diagnosis Date    CVA (cerebral vascular accident)     Endometrial cancer 08/18/2021    Malignant neoplasm of right ovary 06/08/2021      Past Surgical History:   Procedure Laterality Date    ADENOIDECTOMY      CHOLECYSTECTOMY      HYSTERECTOMY       SALPINGOOPHORECTOMY      TONSILLECTOMY       Social History     Socioeconomic History    Marital status: Single   Tobacco Use    Smoking status: Never    Smokeless tobacco: Never   Substance and Sexual Activity    Alcohol use: Not Currently     Comment: 1-2 times per month    Drug use: Never    Sexual activity: Not Currently     Birth control/protection: Abstinence      Family History   Problem Relation Name Age of Onset    Diabetes Mother Aida Clements     Hypertension Mother Aida Clements     Depression Mother Aida Clements     Depression Father Cristobal Clements     Diabetes Father Cristobal Clements     Early death Father Cristobal Clements         Heart failure    Stroke Father Cristobal Clements     Depression Sister Radha Clements-Quan     Depression Sister Haily Clements     Diabetes Sister Haily Clements     COPD Maternal Grandmother Sudha Rojas         CHF and COPD    Colon cancer Maternal Grandfather Denilson [place] 60    Prostate cancer Maternal Grandfather Denilson Rojas 60    Stomach cancer Maternal Great-Grandmother        Review of patient's allergies indicates:   Allergen Reactions    Carboplatin Itching, Palpitations and Shortness Of Breath      Review of Systems   Constitutional:  Negative for appetite change and unexpected weight change.   HENT:  Negative for mouth sores.    Eyes:  Negative for visual disturbance.   Respiratory:  Positive for cough. Negative for shortness of breath.    Cardiovascular:  Negative for chest pain.   Gastrointestinal:  Negative for abdominal pain and diarrhea.   Genitourinary:  Negative for frequency.   Musculoskeletal:  Negative for back pain.   Integumentary:  Negative for rash.   Neurological:  Negative for headaches.   Hematological:  Negative for adenopathy.   Psychiatric/Behavioral:  The patient is not nervous/anxious.          Objective:      Physical Exam  Vitals reviewed.   Constitutional:       General: She is not in acute distress.     Appearance: Normal  appearance.   HENT:      Head: Normocephalic and atraumatic.      Nose: Nose normal.      Mouth/Throat:      Mouth: Mucous membranes are moist.   Eyes:      Extraocular Movements: Extraocular movements intact.      Conjunctiva/sclera: Conjunctivae normal.   Cardiovascular:      Rate and Rhythm: Normal rate and regular rhythm.      Pulses: Normal pulses.      Heart sounds: Normal heart sounds.   Pulmonary:      Effort: Pulmonary effort is normal.      Breath sounds: Normal breath sounds.   Musculoskeletal:         General: No swelling. Normal range of motion.      Cervical back: Normal range of motion and neck supple.      Right lower leg: No edema.      Left lower leg: No edema.   Skin:     General: Skin is warm and dry.   Neurological:      General: No focal deficit present.      Mental Status: She is alert and oriented to person, place, and time. Mental status is at baseline.   Psychiatric:         Mood and Affect: Mood normal.         Behavior: Behavior normal.         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/2021 with surgery (cul-de-sac nodules found during completion hysterectomy and salpingo-oophorectomy)  pT1a, N0, M0 stage IA endometrial cancer diagnosed 8/18/2021   Recurrent endometrial cancer of a left abdominal wall mass and right lung as of January 2024 in February 2024 respectively     **An addendum to the pathology report from 9/7/2021 internal 9/17/2021 states that the pathologist and the submitting physician's physician assistant had a discussion, and the possibility that the ovary is a metastatic lesion from the endometrium was discussed with a metastasis to the ovary being favored by the clinician.  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        Plan:     Patient completed 6 cycles of adjuvant carboplatin and paclitaxel on  02/16/2022.     CT scan on 11/22/2023 showed increasing size of the right apical nodule, now measuring 1.0 x 1.1 cm.  There was also a new 1.0 cm nodule in the left abdominal wall.    PET/CT scan done on 12/06/2023 shows hypermetabolic right lung nodule, left abdominal wall nodule, right axillary lymph node and portacaval lymph node.    Right axillary lymph node done on 12/11/2023 benign    Left lower abdominal wall nodule biopsy done on 01/25/2024 at [facility] showed recurrent endometrial cancer.    The lung lesion was positive on 02/20/2024 for metastatic endometrial carcinoma.  The patient discussed with their gyn Oncology team possible treatment options, they recommended systemic therapy with carboplatin and paclitaxel.  The patient would like to treat with systemic therapy.    Recent CT scans at [facility] on 05/09/2024 showed interval decrease in the right upper lobe pulmonary metastasis which is now subcentimeter in size with no new disease seen in the chest, abdomen or pelvis.  There recommendation was to continue with carbo Taxol for 3 more cycles.    Continue carboplatin and paclitaxel-- Started on 03/13/2024. Cycle 6 received on 06/26/2024      Protonix and Pepcid for heartburn, tums PRN    Continue Zofran or compazine as needed for nausea    Recommended she use a stool softener 1-2 times daily and lactulose as needed.    CT scans of the chest, abdomen, and pelvis done on 07/18/2024 @ [facility]   Resolution of a right upper lobe pulmonary metastasis. A presumed metastatic deposit in the left lateral abdominal wall has also become smaller. Hepatic steatosis and hepatomegaly.     [facility] recommendation is for letrozole maintenance given her ER/[place] tumor status Also radiation oncology consult for consideration of XRT to the abdominal wall lesion.     Started letrozole on today 07/23/2024.    She will meet with Rad Onc at [facility] on 08/02/2024 but would like to treat locally.  Plan to refer her to   Prellop following her consult at [facility]    Return to clinic in 6 weeks    Labs: CBC, CMP, mag, and      All questions were answered to the best of my ability and she understands the plan moving forward.      JOE Raymundo-C       pt is a 72 y/o R handed female w/ PMHx of HLD presents with dizziness, left sided numbness and weakness. Patient woke up this morning with dizziness and sensation of room spinning.

## 2024-07-31 ENCOUNTER — PATIENT MESSAGE (OUTPATIENT)
Dept: HEMATOLOGY/ONCOLOGY | Facility: CLINIC | Age: 39
End: 2024-07-31
Payer: COMMERCIAL

## 2024-08-02 ENCOUNTER — PATIENT MESSAGE (OUTPATIENT)
Dept: HEMATOLOGY/ONCOLOGY | Facility: CLINIC | Age: 39
End: 2024-08-02
Payer: COMMERCIAL

## 2024-08-05 DIAGNOSIS — C54.1 ENDOMETRIAL CANCER: ICD-10-CM

## 2024-08-05 DIAGNOSIS — C56.1 MALIGNANT NEOPLASM OF RIGHT OVARY: Primary | ICD-10-CM

## 2024-08-13 ENCOUNTER — CLINICAL SUPPORT (OUTPATIENT)
Dept: RADIATION THERAPY | Facility: HOSPITAL | Age: 39
End: 2024-08-13
Attending: RADIOLOGY
Payer: COMMERCIAL

## 2024-08-13 DIAGNOSIS — C56.1 MALIGNANT NEOPLASM OF RIGHT OVARY: ICD-10-CM

## 2024-08-13 DIAGNOSIS — C54.1 ENDOMETRIAL CANCER: ICD-10-CM

## 2024-08-13 PROCEDURE — 77334 RADIATION TREATMENT AID(S): CPT | Performed by: RADIOLOGY

## 2024-08-14 ENCOUNTER — PATIENT MESSAGE (OUTPATIENT)
Dept: HEMATOLOGY/ONCOLOGY | Facility: CLINIC | Age: 39
End: 2024-08-14
Payer: COMMERCIAL

## 2024-08-14 DIAGNOSIS — R53.83 OTHER FATIGUE: ICD-10-CM

## 2024-08-14 DIAGNOSIS — C56.1 MALIGNANT NEOPLASM OF RIGHT OVARY: Primary | ICD-10-CM

## 2024-08-21 PROCEDURE — 77301 RADIOTHERAPY DOSE PLAN IMRT: CPT | Performed by: RADIOLOGY

## 2024-08-21 PROCEDURE — 77338 DESIGN MLC DEVICE FOR IMRT: CPT | Performed by: RADIOLOGY

## 2024-08-21 PROCEDURE — 77300 RADIATION THERAPY DOSE PLAN: CPT | Performed by: RADIOLOGY

## 2024-08-22 PROCEDURE — 77373 STRTCTC BDY RAD THER TX DLVR: CPT | Performed by: RADIOLOGY

## 2024-08-27 ENCOUNTER — PATIENT MESSAGE (OUTPATIENT)
Dept: HEMATOLOGY/ONCOLOGY | Facility: CLINIC | Age: 39
End: 2024-08-27
Payer: COMMERCIAL

## 2024-08-27 PROCEDURE — 77373 STRTCTC BDY RAD THER TX DLVR: CPT | Performed by: RADIOLOGY

## 2024-08-29 PROCEDURE — 77373 STRTCTC BDY RAD THER TX DLVR: CPT | Performed by: RADIOLOGY

## 2024-09-03 ENCOUNTER — PATIENT MESSAGE (OUTPATIENT)
Dept: HEMATOLOGY/ONCOLOGY | Facility: CLINIC | Age: 39
End: 2024-09-03

## 2024-09-03 ENCOUNTER — LAB VISIT (OUTPATIENT)
Dept: LAB | Facility: HOSPITAL | Age: 39
End: 2024-09-03
Attending: NURSE PRACTITIONER
Payer: COMMERCIAL

## 2024-09-03 ENCOUNTER — CLINICAL SUPPORT (OUTPATIENT)
Dept: RADIATION THERAPY | Facility: HOSPITAL | Age: 39
End: 2024-09-03
Attending: RADIOLOGY
Payer: COMMERCIAL

## 2024-09-03 ENCOUNTER — OFFICE VISIT (OUTPATIENT)
Dept: HEMATOLOGY/ONCOLOGY | Facility: CLINIC | Age: 39
End: 2024-09-03
Payer: COMMERCIAL

## 2024-09-03 VITALS
HEART RATE: 96 BPM | DIASTOLIC BLOOD PRESSURE: 55 MMHG | BODY MASS INDEX: 46.74 KG/M2 | TEMPERATURE: 98 F | OXYGEN SATURATION: 98 % | HEIGHT: 62 IN | RESPIRATION RATE: 18 BRPM | WEIGHT: 254 LBS | SYSTOLIC BLOOD PRESSURE: 90 MMHG

## 2024-09-03 DIAGNOSIS — C54.1 ENDOMETRIAL CANCER: ICD-10-CM

## 2024-09-03 DIAGNOSIS — M25.50 AROMATASE INHIBITOR-ASSOCIATED ARTHRALGIA: ICD-10-CM

## 2024-09-03 DIAGNOSIS — R53.83 OTHER FATIGUE: ICD-10-CM

## 2024-09-03 DIAGNOSIS — C56.1 MALIGNANT NEOPLASM OF RIGHT OVARY: ICD-10-CM

## 2024-09-03 DIAGNOSIS — Z79.811 LONG TERM (CURRENT) USE OF AROMATASE INHIBITORS: ICD-10-CM

## 2024-09-03 DIAGNOSIS — C54.1 ENDOMETRIAL CANCER: Primary | ICD-10-CM

## 2024-09-03 DIAGNOSIS — T45.1X5A AROMATASE INHIBITOR-ASSOCIATED ARTHRALGIA: ICD-10-CM

## 2024-09-03 LAB
ALBUMIN SERPL-MCNC: 4.1 G/DL (ref 3.5–5)
ALBUMIN/GLOB SERPL: 1.4 RATIO (ref 1.1–2)
ALP SERPL-CCNC: 93 UNIT/L (ref 40–150)
ALT SERPL-CCNC: 51 UNIT/L (ref 0–55)
ANION GAP SERPL CALC-SCNC: 10 MEQ/L
AST SERPL-CCNC: 30 UNIT/L (ref 5–34)
BASOPHILS # BLD AUTO: 0.03 X10(3)/MCL
BASOPHILS NFR BLD AUTO: 0.4 %
BILIRUB SERPL-MCNC: 0.3 MG/DL
BUN SERPL-MCNC: 11.9 MG/DL (ref 7–18.7)
CALCIUM SERPL-MCNC: 9.9 MG/DL (ref 8.4–10.2)
CANCER AG125 SERPL-ACNC: 9.2 UNIT/ML (ref 0–35)
CHLORIDE SERPL-SCNC: 107 MMOL/L (ref 98–107)
CO2 SERPL-SCNC: 30 MMOL/L (ref 22–29)
CREAT SERPL-MCNC: 0.81 MG/DL (ref 0.55–1.02)
CREAT/UREA NIT SERPL: 15
EOSINOPHIL # BLD AUTO: 0.1 X10(3)/MCL (ref 0–0.9)
EOSINOPHIL NFR BLD AUTO: 1.3 %
ERYTHROCYTE [DISTWIDTH] IN BLOOD BY AUTOMATED COUNT: 12.3 % (ref 11.5–17)
GFR SERPLBLD CREATININE-BSD FMLA CKD-EPI: >60 ML/MIN/1.73/M2
GLOBULIN SER-MCNC: 3 GM/DL (ref 2.4–3.5)
GLUCOSE SERPL-MCNC: 74 MG/DL (ref 74–100)
HCT VFR BLD AUTO: 39.2 % (ref 37–47)
HGB BLD-MCNC: 13 G/DL (ref 12–16)
IMM GRANULOCYTES # BLD AUTO: 0.02 X10(3)/MCL (ref 0–0.04)
IMM GRANULOCYTES NFR BLD AUTO: 0.3 %
LYMPHOCYTES # BLD AUTO: 1.57 X10(3)/MCL (ref 0.6–4.6)
LYMPHOCYTES NFR BLD AUTO: 20.2 %
MAGNESIUM SERPL-MCNC: 2 MG/DL (ref 1.6–2.6)
MCH RBC QN AUTO: 31.2 PG (ref 27–31)
MCHC RBC AUTO-ENTMCNC: 33.2 G/DL (ref 33–36)
MCV RBC AUTO: 94 FL (ref 80–94)
MONOCYTES # BLD AUTO: 0.59 X10(3)/MCL (ref 0.1–1.3)
MONOCYTES NFR BLD AUTO: 7.6 %
NEUTROPHILS # BLD AUTO: 5.47 X10(3)/MCL (ref 2.1–9.2)
NEUTROPHILS NFR BLD AUTO: 70.2 %
PLATELET # BLD AUTO: 312 X10(3)/MCL (ref 130–400)
PMV BLD AUTO: 9.1 FL (ref 7.4–10.4)
POTASSIUM SERPL-SCNC: 4.1 MMOL/L (ref 3.5–5.1)
PROT SERPL-MCNC: 7.1 GM/DL (ref 6.4–8.3)
RBC # BLD AUTO: 4.17 X10(6)/MCL (ref 4.2–5.4)
SODIUM SERPL-SCNC: 147 MMOL/L (ref 136–145)
TSH SERPL-ACNC: 1.19 UIU/ML (ref 0.35–4.94)
WBC # BLD AUTO: 7.78 X10(3)/MCL (ref 4.5–11.5)

## 2024-09-03 PROCEDURE — 80053 COMPREHEN METABOLIC PANEL: CPT

## 2024-09-03 PROCEDURE — 85025 COMPLETE CBC W/AUTO DIFF WBC: CPT

## 2024-09-03 PROCEDURE — 77336 RADIATION PHYSICS CONSULT: CPT | Performed by: RADIOLOGY

## 2024-09-03 PROCEDURE — 1159F MED LIST DOCD IN RCRD: CPT | Mod: CPTII,S$GLB,, | Performed by: NURSE PRACTITIONER

## 2024-09-03 PROCEDURE — 3008F BODY MASS INDEX DOCD: CPT | Mod: CPTII,S$GLB,, | Performed by: NURSE PRACTITIONER

## 2024-09-03 PROCEDURE — 77373 STRTCTC BDY RAD THER TX DLVR: CPT | Performed by: RADIOLOGY

## 2024-09-03 PROCEDURE — 3078F DIAST BP <80 MM HG: CPT | Mod: CPTII,S$GLB,, | Performed by: NURSE PRACTITIONER

## 2024-09-03 PROCEDURE — 3074F SYST BP LT 130 MM HG: CPT | Mod: CPTII,S$GLB,, | Performed by: NURSE PRACTITIONER

## 2024-09-03 PROCEDURE — 99999 PR PBB SHADOW E&M-EST. PATIENT-LVL V: CPT | Mod: PBBFAC,,, | Performed by: NURSE PRACTITIONER

## 2024-09-03 PROCEDURE — 83735 ASSAY OF MAGNESIUM: CPT

## 2024-09-03 PROCEDURE — 36415 COLL VENOUS BLD VENIPUNCTURE: CPT

## 2024-09-03 PROCEDURE — 86304 IMMUNOASSAY TUMOR CA 125: CPT

## 2024-09-03 PROCEDURE — 84443 ASSAY THYROID STIM HORMONE: CPT

## 2024-09-03 PROCEDURE — 1160F RVW MEDS BY RX/DR IN RCRD: CPT | Mod: CPTII,S$GLB,, | Performed by: NURSE PRACTITIONER

## 2024-09-03 PROCEDURE — 99214 OFFICE O/P EST MOD 30 MIN: CPT | Mod: S$GLB,,, | Performed by: NURSE PRACTITIONER

## 2024-09-03 NOTE — PROGRESS NOTES
Subjective:       Patient ID: Ruben Clements is a 39 y.o. female.    Chief Complaint: Follow-up (Pt has no concerns today)      Medical oncologist in [place]:  Jonas Velazquez MD  GYN/ONC:  Darryn Pires MD     Diagnosis:  pT2b, N0, M0 stage IIB (due to cul-de-sac nodules) right ovarian cancer diagnosed 6/8/2021 with surgery (cul-de-sac nodules found during completion hysterectomy and salpingo-oophorectomy)  pT1a, N0, M0 stage IA endometrial cancer diagnosed 8/18/2021  Recurrence in the left abdominal sidewall diagnosed 01/25/2024 via biopsy at [facility].     **An addendum to the pathology report from 9/7/2021 internal 9/17/2021 states that the pathologist and the submitting physician's physician assistant had a discussion, and the possibility that the ovary is a metastatic lesion from the endometrium was discussed with a metastasis to the ovary being favored by the clinician.  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     Current Treatment:   Carboplatin and paclitaxel every 3 weeks started on 03/13/2024 (since recurrence was greater than a year since her last carbo/taxol dose). Cycle 6 received on 06/26/2024    Treatment History:  Adjuvant carboplatin and paclitaxel given every 3 weeks for 6 cycles, started on 10/27/2021.  Completed on 2/16/2022.      HPI:  Patient who was having irregular menses, pelvic pain, abdominal pain along with nausea and vomiting.  She presented to see her OB/GYN, imaging was done that showed ovarian cyst.  She underwent a right oophorectomy on 6/8/2021, pathology revealed a 15 cm well-differentiated endometrioid adenocarcinoma involving the right ovary, right fallopian tube was uninvolved.  She was then referred to Dr. Darryn Pires with GYN oncology in [place], [place].  On 7/16/2021, she underwent Terra hereditary cancer test, this was negative for pathogenic mutations.  Her [facility] breast cancer risk  assessment was 13.9% (general population was 13.2%, no significant increased risk compared to general population).  She underwent an endometrial biopsy on 8/18/2021 that revealed FIGO grade 1 endometrioid endometrial adenocarcinoma.  She then underwent a robotic hysterectomy, left oophorectomy, bilateral pelvic sentinel node dissection with partial omentectomy and peritoneal biopsies of cul-de-sac nodules.  Pathology revealed endometrioid endometrial adenocarcinoma, FIGO 1 with less than 50% invasion.  Margins were negative, lymph nodes were negative, cul-de-sac nodules were positive for metastatic endometrial carcinoma.  Originally, it was discussed that the cul-de-sac lesions were from her ovarian cancer, this would stage her right ovarian cancer as a pT2b, N0, M0 stage IIB and would stage her endometrial cancer as a pT1a, N0, M0 stage IA.  Per an addendum on 9/17/2021, further discussion suggested a pT3a, N0, M0 stage IIIA endometrial cancer.  Patient was seen by Dr. Velazquez on 9/23/2021, plan was to treat with adjuvant carbo Taxol for 6 cycles.  Due to the fact that the patient lives in [place], [place], it was decided to refer to an oncologist in [place], [place] as this was closer to home.  I initially saw the patient on 10/18/2021 at that visit, she stated to overall be feeling well.  She did have some numbness and tingling, also some depression and anxiety, but had no other major issues to discuss.  Patient started her treatment on 10/27/2021, completed on 2/16/2022.  Surveillance CT scans of the chest, abdomen, and pelvis started on 4/11/2022, these showed no evidence of disease. Most recently done on 08/14/2023 showing interim increase in size of the small nodule at the right suprahilar region, previously measuring 3-4 mm, now measuring 6-7 mm. CT scan of the chest, abdomen, and pelvis done on 11/22/2023 Enlarging right apical lung nodule now measuring 1.1 x 1.0 cm.  There was a new 1.0 cm soft tissue  nodule in the fat and musculature of the left lateral lower abdominal wall.    PET/CT scan done on 12/06/2023 showed hypermetabolic 1.0 cm solid right upper lobe nodule with 2 hypermetabolic lymph nodes, 1 in the right axilla and 1 in the portacaval system.  There was a 1.1 cm nodule in the left lower anterior abdominal wall that was also hypermetabolic.    Right axillary lymph node done on 12/11/2023 showed benign findings with no evidence of malignancy.  There was still concern that malignancy was present, patient ended up at [facility].    Left lower abdominal wall nodule biopsy done on 01/25/2024 at [facility] revealed fragments of low-grade endometrioid adenocarcinoma.    She then underwent lung biopsy of the right upper lobe lung nodule at [facility] on 02/20/2024, this returned positive for metastatic endometrial carcinoma.    Started carboplatin and paclitaxel on 03/13/2024. Cycle 6 received on 06/26/2024    CT scans of the chest, abdomen, and pelvis done on 07/18/2024 @ [facility] Resolution of a right upper lobe pulmonary metastasis. A presumed metastatic deposit in the left lateral abdominal wall has also become smaller. Hepatic steatosis and hepatomegaly.     Interval History:   Patient presents to clinic for scheduled follow up appointment.  She is currently undergoing radiation to the abdominal wall lesion.  She is taking letrozole daily.  She states that when she 1st started the medication she was having a lot of pain in her hands but this is improved.  She has noticed some weight gain as well.  Her hair starting to grow back.  She is having some diarrhea with radiation.        Past Medical History:   Diagnosis Date    CVA (cerebral vascular accident)     Endometrial cancer 08/18/2021    Malignant neoplasm of right ovary 06/08/2021      Past Surgical History:   Procedure Laterality Date    ADENOIDECTOMY      CHOLECYSTECTOMY      HYSTERECTOMY      SALPINGOOPHORECTOMY      TONSILLECTOMY       Social  History     Socioeconomic History    Marital status: Single   Tobacco Use    Smoking status: Never    Smokeless tobacco: Never   Substance and Sexual Activity    Alcohol use: Not Currently     Comment: 1-2 times per month    Drug use: Never    Sexual activity: Not Currently     Birth control/protection: Abstinence      Family History   Problem Relation Name Age of Onset    Diabetes Mother Aida Clements     Hypertension Mother Aida Clements     Depression Mother Aida Clements     Depression Father Cristobal Clements     Diabetes Father Cristobal Clements     Early death Father Cristobal Clements         Heart failure    Stroke Father Cristobal Clements     Depression Sister Radha Clements-Quan     Depression Sister Haily Clements     Diabetes Sister Haily Clements     COPD Maternal Grandmother Sudha Rojas         CHF and COPD    Colon cancer Maternal Grandfather Denilson Rojas 60    Prostate cancer Maternal Grandfather Denilson Rojas 60    Stomach cancer Maternal Great-Grandmother        Review of patient's allergies indicates:   Allergen Reactions    Carboplatin Itching, Palpitations and Shortness Of Breath      Review of Systems   Constitutional:  Negative for appetite change and unexpected weight change.   HENT:  Negative for mouth sores.    Eyes:  Negative for visual disturbance.   Respiratory:  Negative for cough and shortness of breath.    Cardiovascular:  Negative for chest pain.   Gastrointestinal:  Negative for abdominal pain and diarrhea.   Genitourinary:  Negative for frequency.   Musculoskeletal:  Negative for back pain.   Integumentary:  Negative for rash.   Neurological:  Negative for headaches.   Hematological:  Negative for adenopathy.   Psychiatric/Behavioral:  The patient is not nervous/anxious.          Objective:      Physical Exam  Vitals reviewed.   Constitutional:       General: She is not in acute distress.     Appearance: Normal appearance.   HENT:      Head: Normocephalic and atraumatic.      Nose:  Nose normal.      Mouth/Throat:      Mouth: Mucous membranes are moist.   Eyes:      Extraocular Movements: Extraocular movements intact.      Conjunctiva/sclera: Conjunctivae normal.   Cardiovascular:      Rate and Rhythm: Normal rate and regular rhythm.      Pulses: Normal pulses.      Heart sounds: Normal heart sounds.   Pulmonary:      Effort: Pulmonary effort is normal.      Breath sounds: Normal breath sounds.   Musculoskeletal:         General: No swelling. Normal range of motion.      Cervical back: Normal range of motion and neck supple.      Right lower leg: No edema.      Left lower leg: No edema.   Skin:     General: Skin is warm and dry.   Neurological:      General: No focal deficit present.      Mental Status: She is alert and oriented to person, place, and time. Mental status is at baseline.   Psychiatric:         Mood and Affect: Mood normal.         Behavior: Behavior normal.         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/2021 with surgery (cul-de-sac nodules found during completion hysterectomy and salpingo-oophorectomy)  pT1a, N0, M0 stage IA endometrial cancer diagnosed 8/18/2021   Recurrent endometrial cancer of a left abdominal wall mass and right lung as of January 2024 in February 2024 respectively     **An addendum to the pathology report from 9/7/2021 internal 9/17/2021 states that the pathologist and the submitting physician's physician assistant had a discussion, and the possibility that the ovary is a metastatic lesion from the endometrium was discussed with a metastasis to the ovary being favored by the clinician.  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        Plan:     Patient completed 6 cycles of adjuvant carboplatin and paclitaxel on 02/16/2022.     CT scan on 11/22/2023 showed increasing size of the  right apical nodule, now measuring 1.0 x 1.1 cm.  There was also a new 1.0 cm nodule in the left abdominal wall.    PET/CT scan done on 12/06/2023 shows hypermetabolic right lung nodule, left abdominal wall nodule, right axillary lymph node and portacaval lymph node.    Right axillary lymph node done on 12/11/2023 benign    Left lower abdominal wall nodule biopsy done on 01/25/2024 at [facility] showed recurrent endometrial cancer.    The lung lesion was positive on 02/20/2024 for metastatic endometrial carcinoma.  The patient discussed with their gyn Oncology team possible treatment options, they recommended systemic therapy with carboplatin and paclitaxel.  The patient would like to treat with systemic therapy.    Recent CT scans at [facility] on 05/09/2024 showed interval decrease in the right upper lobe pulmonary metastasis which is now subcentimeter in size with no new disease seen in the chest, abdomen or pelvis.  There recommendation was to continue with carbo Taxol for 3 more cycles.    Continue carboplatin and paclitaxel-- Started on 03/13/2024. Cycle 6 received on 06/26/2024      Protonix and Pepcid for heartburn, tums PRN    Continue Zofran or compazine as needed for nausea    Recommended she use a stool softener 1-2 times daily and lactulose as needed.    CT scans of the chest, abdomen, and pelvis done on 07/18/2024 @ [facility]   Resolution of a right upper lobe pulmonary metastasis. A presumed metastatic deposit in the left lateral abdominal wall has also become smaller. Hepatic steatosis and hepatomegaly.     [facility] recommendation is for letrozole maintenance given her ER/[place] tumor status Also radiation oncology consult for consideration of XRT to the abdominal wall lesion.     Currently undergoing radiation    Started letrozole 07/23/2024.  Recommended she continue    She has follow-up with [facility] on 10/22/2024      Return to clinic in 8 weeks    Labs: CBC, CMP, mag, and      All questions  were answered to the best of my ability and she understands the plan moving forward.      Yolanda Daniel, FNP-C

## 2024-09-05 PROCEDURE — 77373 STRTCTC BDY RAD THER TX DLVR: CPT | Performed by: RADIOLOGY

## 2024-09-06 ENCOUNTER — PATIENT MESSAGE (OUTPATIENT)
Dept: HEMATOLOGY/ONCOLOGY | Facility: CLINIC | Age: 39
End: 2024-09-06
Payer: COMMERCIAL

## 2024-10-15 ENCOUNTER — PATIENT MESSAGE (OUTPATIENT)
Dept: HEMATOLOGY/ONCOLOGY | Facility: CLINIC | Age: 39
End: 2024-10-15
Payer: COMMERCIAL

## 2024-10-17 ENCOUNTER — PATIENT MESSAGE (OUTPATIENT)
Dept: HEMATOLOGY/ONCOLOGY | Facility: CLINIC | Age: 39
End: 2024-10-17
Payer: COMMERCIAL

## 2024-10-21 DIAGNOSIS — C54.1 ENDOMETRIAL CANCER: Primary | ICD-10-CM

## 2024-10-29 ENCOUNTER — OFFICE VISIT (OUTPATIENT)
Dept: HEMATOLOGY/ONCOLOGY | Facility: CLINIC | Age: 39
End: 2024-10-29
Payer: COMMERCIAL

## 2024-10-29 ENCOUNTER — LAB VISIT (OUTPATIENT)
Dept: LAB | Facility: HOSPITAL | Age: 39
End: 2024-10-29
Attending: INTERNAL MEDICINE
Payer: COMMERCIAL

## 2024-10-29 VITALS
DIASTOLIC BLOOD PRESSURE: 78 MMHG | OXYGEN SATURATION: 100 % | WEIGHT: 256.75 LBS | BODY MASS INDEX: 47.25 KG/M2 | HEIGHT: 62 IN | HEART RATE: 76 BPM | RESPIRATION RATE: 18 BRPM | SYSTOLIC BLOOD PRESSURE: 116 MMHG

## 2024-10-29 DIAGNOSIS — C54.1 ENDOMETRIAL CANCER: ICD-10-CM

## 2024-10-29 DIAGNOSIS — C54.1 ENDOMETRIAL CANCER: Primary | ICD-10-CM

## 2024-10-29 DIAGNOSIS — C56.1 MALIGNANT NEOPLASM OF RIGHT OVARY: ICD-10-CM

## 2024-10-29 LAB
ALBUMIN SERPL-MCNC: 3.9 G/DL (ref 3.5–5)
ALBUMIN/GLOB SERPL: 1.1 RATIO (ref 1.1–2)
ALP SERPL-CCNC: 97 UNIT/L (ref 40–150)
ALT SERPL-CCNC: 44 UNIT/L (ref 0–55)
ANION GAP SERPL CALC-SCNC: 10 MEQ/L
AST SERPL-CCNC: 27 UNIT/L (ref 5–34)
BASOPHILS # BLD AUTO: 0.02 X10(3)/MCL
BASOPHILS NFR BLD AUTO: 0.2 %
BILIRUB SERPL-MCNC: 0.3 MG/DL
BUN SERPL-MCNC: 10.6 MG/DL (ref 7–18.7)
CALCIUM SERPL-MCNC: 9.7 MG/DL (ref 8.4–10.2)
CANCER AG125 SERPL-ACNC: 10.7 UNIT/ML (ref 0–35)
CHLORIDE SERPL-SCNC: 108 MMOL/L (ref 98–107)
CO2 SERPL-SCNC: 27 MMOL/L (ref 22–29)
CREAT SERPL-MCNC: 0.73 MG/DL (ref 0.55–1.02)
CREAT/UREA NIT SERPL: 15
EOSINOPHIL # BLD AUTO: 0.11 X10(3)/MCL (ref 0–0.9)
EOSINOPHIL NFR BLD AUTO: 1.3 %
ERYTHROCYTE [DISTWIDTH] IN BLOOD BY AUTOMATED COUNT: 12.4 % (ref 11.5–17)
GFR SERPLBLD CREATININE-BSD FMLA CKD-EPI: >60 ML/MIN/1.73/M2
GLOBULIN SER-MCNC: 3.4 GM/DL (ref 2.4–3.5)
GLUCOSE SERPL-MCNC: 81 MG/DL (ref 74–100)
HCT VFR BLD AUTO: 38.6 % (ref 37–47)
HGB BLD-MCNC: 13 G/DL (ref 12–16)
IMM GRANULOCYTES # BLD AUTO: 0.04 X10(3)/MCL (ref 0–0.04)
IMM GRANULOCYTES NFR BLD AUTO: 0.5 %
LYMPHOCYTES # BLD AUTO: 1.92 X10(3)/MCL (ref 0.6–4.6)
LYMPHOCYTES NFR BLD AUTO: 23.4 %
MAGNESIUM SERPL-MCNC: 2 MG/DL (ref 1.6–2.6)
MCH RBC QN AUTO: 31.2 PG (ref 27–31)
MCHC RBC AUTO-ENTMCNC: 33.7 G/DL (ref 33–36)
MCV RBC AUTO: 92.6 FL (ref 80–94)
MONOCYTES # BLD AUTO: 0.51 X10(3)/MCL (ref 0.1–1.3)
MONOCYTES NFR BLD AUTO: 6.2 %
NEUTROPHILS # BLD AUTO: 5.61 X10(3)/MCL (ref 2.1–9.2)
NEUTROPHILS NFR BLD AUTO: 68.4 %
PLATELET # BLD AUTO: 306 X10(3)/MCL (ref 130–400)
PMV BLD AUTO: 9.3 FL (ref 7.4–10.4)
POTASSIUM SERPL-SCNC: 3.8 MMOL/L (ref 3.5–5.1)
PROT SERPL-MCNC: 7.3 GM/DL (ref 6.4–8.3)
RBC # BLD AUTO: 4.17 X10(6)/MCL (ref 4.2–5.4)
SODIUM SERPL-SCNC: 145 MMOL/L (ref 136–145)
WBC # BLD AUTO: 8.21 X10(3)/MCL (ref 4.5–11.5)

## 2024-10-29 PROCEDURE — 99999 PR PBB SHADOW E&M-EST. PATIENT-LVL IV: CPT | Mod: PBBFAC,,, | Performed by: INTERNAL MEDICINE

## 2024-10-29 PROCEDURE — 85025 COMPLETE CBC W/AUTO DIFF WBC: CPT

## 2024-10-29 PROCEDURE — 1160F RVW MEDS BY RX/DR IN RCRD: CPT | Mod: CPTII,S$GLB,, | Performed by: INTERNAL MEDICINE

## 2024-10-29 PROCEDURE — 3008F BODY MASS INDEX DOCD: CPT | Mod: CPTII,S$GLB,, | Performed by: INTERNAL MEDICINE

## 2024-10-29 PROCEDURE — 1159F MED LIST DOCD IN RCRD: CPT | Mod: CPTII,S$GLB,, | Performed by: INTERNAL MEDICINE

## 2024-10-29 PROCEDURE — 99214 OFFICE O/P EST MOD 30 MIN: CPT | Mod: S$GLB,,, | Performed by: INTERNAL MEDICINE

## 2024-10-29 PROCEDURE — 36415 COLL VENOUS BLD VENIPUNCTURE: CPT

## 2024-10-29 PROCEDURE — 3078F DIAST BP <80 MM HG: CPT | Mod: CPTII,S$GLB,, | Performed by: INTERNAL MEDICINE

## 2024-10-29 PROCEDURE — 83735 ASSAY OF MAGNESIUM: CPT

## 2024-10-29 PROCEDURE — 3074F SYST BP LT 130 MM HG: CPT | Mod: CPTII,S$GLB,, | Performed by: INTERNAL MEDICINE

## 2024-10-29 PROCEDURE — 86304 IMMUNOASSAY TUMOR CA 125: CPT

## 2024-10-29 PROCEDURE — 80053 COMPREHEN METABOLIC PANEL: CPT

## 2024-11-11 ENCOUNTER — PATIENT MESSAGE (OUTPATIENT)
Dept: HEMATOLOGY/ONCOLOGY | Facility: CLINIC | Age: 39
End: 2024-11-11
Payer: COMMERCIAL

## 2024-11-22 ENCOUNTER — PATIENT MESSAGE (OUTPATIENT)
Dept: HEMATOLOGY/ONCOLOGY | Facility: CLINIC | Age: 39
End: 2024-11-22
Payer: COMMERCIAL

## 2024-12-09 ENCOUNTER — PATIENT MESSAGE (OUTPATIENT)
Dept: HEMATOLOGY/ONCOLOGY | Facility: CLINIC | Age: 39
End: 2024-12-09
Payer: COMMERCIAL

## 2024-12-16 ENCOUNTER — PATIENT MESSAGE (OUTPATIENT)
Dept: HEMATOLOGY/ONCOLOGY | Facility: CLINIC | Age: 39
End: 2024-12-16
Payer: COMMERCIAL

## 2024-12-18 ENCOUNTER — PATIENT MESSAGE (OUTPATIENT)
Dept: HEMATOLOGY/ONCOLOGY | Facility: CLINIC | Age: 39
End: 2024-12-18
Payer: COMMERCIAL

## 2025-01-30 ENCOUNTER — OFFICE VISIT (OUTPATIENT)
Dept: HEMATOLOGY/ONCOLOGY | Facility: CLINIC | Age: 40
End: 2025-01-30
Payer: COMMERCIAL

## 2025-01-30 ENCOUNTER — INFUSION (OUTPATIENT)
Dept: INFUSION THERAPY | Facility: HOSPITAL | Age: 40
End: 2025-01-30
Attending: INTERNAL MEDICINE
Payer: COMMERCIAL

## 2025-01-30 VITALS
DIASTOLIC BLOOD PRESSURE: 85 MMHG | HEART RATE: 83 BPM | SYSTOLIC BLOOD PRESSURE: 130 MMHG | OXYGEN SATURATION: 98 % | RESPIRATION RATE: 18 BRPM | TEMPERATURE: 98 F | BODY MASS INDEX: 46.48 KG/M2 | HEIGHT: 62 IN | WEIGHT: 252.56 LBS

## 2025-01-30 VITALS
DIASTOLIC BLOOD PRESSURE: 85 MMHG | RESPIRATION RATE: 18 BRPM | SYSTOLIC BLOOD PRESSURE: 130 MMHG | HEIGHT: 62 IN | BODY MASS INDEX: 46.48 KG/M2 | OXYGEN SATURATION: 98 % | HEART RATE: 83 BPM | WEIGHT: 252.56 LBS | TEMPERATURE: 98 F

## 2025-01-30 DIAGNOSIS — T45.1X5A AROMATASE INHIBITOR-ASSOCIATED ARTHRALGIA: ICD-10-CM

## 2025-01-30 DIAGNOSIS — C54.1 ENDOMETRIAL CANCER: Primary | ICD-10-CM

## 2025-01-30 DIAGNOSIS — C54.1 ENDOMETRIAL CANCER: ICD-10-CM

## 2025-01-30 DIAGNOSIS — M25.50 AROMATASE INHIBITOR-ASSOCIATED ARTHRALGIA: ICD-10-CM

## 2025-01-30 DIAGNOSIS — C78.01 MALIGNANT NEOPLASM METASTATIC TO RIGHT LUNG: ICD-10-CM

## 2025-01-30 DIAGNOSIS — Z95.828 PORT-A-CATH IN PLACE: ICD-10-CM

## 2025-01-30 DIAGNOSIS — Z95.828 PORT-A-CATH IN PLACE: Primary | ICD-10-CM

## 2025-01-30 DIAGNOSIS — C56.1 MALIGNANT NEOPLASM OF RIGHT OVARY: ICD-10-CM

## 2025-01-30 DIAGNOSIS — E86.0 DEHYDRATION: ICD-10-CM

## 2025-01-30 LAB
ALBUMIN SERPL-MCNC: 4.1 G/DL (ref 3.5–5)
ALBUMIN/GLOB SERPL: 1.2 RATIO (ref 1.1–2)
ALP SERPL-CCNC: 118 UNIT/L (ref 40–150)
ALT SERPL-CCNC: 38 UNIT/L (ref 0–55)
ANION GAP SERPL CALC-SCNC: 10 MEQ/L
AST SERPL-CCNC: 25 UNIT/L (ref 5–34)
BASOPHILS # BLD AUTO: 0.02 X10(3)/MCL
BASOPHILS NFR BLD AUTO: 0.3 %
BILIRUB SERPL-MCNC: 0.8 MG/DL
BUN SERPL-MCNC: 11.4 MG/DL (ref 7–18.7)
CALCIUM SERPL-MCNC: 9.8 MG/DL (ref 8.4–10.2)
CANCER AG125 SERPL-ACNC: 9.4 UNIT/ML (ref 0–35)
CHLORIDE SERPL-SCNC: 106 MMOL/L (ref 98–107)
CO2 SERPL-SCNC: 24 MMOL/L (ref 22–29)
CREAT SERPL-MCNC: 0.76 MG/DL (ref 0.55–1.02)
CREAT/UREA NIT SERPL: 15
EOSINOPHIL # BLD AUTO: 0.11 X10(3)/MCL (ref 0–0.9)
EOSINOPHIL NFR BLD AUTO: 1.5 %
ERYTHROCYTE [DISTWIDTH] IN BLOOD BY AUTOMATED COUNT: 12 % (ref 11.5–17)
GFR SERPLBLD CREATININE-BSD FMLA CKD-EPI: >60 ML/MIN/1.73/M2
GLOBULIN SER-MCNC: 3.5 GM/DL (ref 2.4–3.5)
GLUCOSE SERPL-MCNC: 120 MG/DL (ref 74–100)
HCT VFR BLD AUTO: 40 % (ref 37–47)
HGB BLD-MCNC: 13.6 G/DL (ref 12–16)
IMM GRANULOCYTES # BLD AUTO: 0.03 X10(3)/MCL (ref 0–0.04)
IMM GRANULOCYTES NFR BLD AUTO: 0.4 %
LYMPHOCYTES # BLD AUTO: 1.17 X10(3)/MCL (ref 0.6–4.6)
LYMPHOCYTES NFR BLD AUTO: 15.8 %
MCH RBC QN AUTO: 30.8 PG (ref 27–31)
MCHC RBC AUTO-ENTMCNC: 34 G/DL (ref 33–36)
MCV RBC AUTO: 90.5 FL (ref 80–94)
MONOCYTES # BLD AUTO: 0.33 X10(3)/MCL (ref 0.1–1.3)
MONOCYTES NFR BLD AUTO: 4.5 %
NEUTROPHILS # BLD AUTO: 5.74 X10(3)/MCL (ref 2.1–9.2)
NEUTROPHILS NFR BLD AUTO: 77.5 %
PLATELET # BLD AUTO: 337 X10(3)/MCL (ref 130–400)
PMV BLD AUTO: 9.2 FL (ref 7.4–10.4)
POTASSIUM SERPL-SCNC: 3.8 MMOL/L (ref 3.5–5.1)
PROT SERPL-MCNC: 7.6 GM/DL (ref 6.4–8.3)
RBC # BLD AUTO: 4.42 X10(6)/MCL (ref 4.2–5.4)
SODIUM SERPL-SCNC: 140 MMOL/L (ref 136–145)
WBC # BLD AUTO: 7.4 X10(3)/MCL (ref 4.5–11.5)

## 2025-01-30 PROCEDURE — 85025 COMPLETE CBC W/AUTO DIFF WBC: CPT | Performed by: NURSE PRACTITIONER

## 2025-01-30 PROCEDURE — 99214 OFFICE O/P EST MOD 30 MIN: CPT | Mod: S$GLB,,, | Performed by: NURSE PRACTITIONER

## 2025-01-30 PROCEDURE — 86304 IMMUNOASSAY TUMOR CA 125: CPT | Performed by: NURSE PRACTITIONER

## 2025-01-30 PROCEDURE — 80053 COMPREHEN METABOLIC PANEL: CPT | Performed by: NURSE PRACTITIONER

## 2025-01-30 PROCEDURE — 1160F RVW MEDS BY RX/DR IN RCRD: CPT | Mod: CPTII,S$GLB,, | Performed by: NURSE PRACTITIONER

## 2025-01-30 PROCEDURE — 3079F DIAST BP 80-89 MM HG: CPT | Mod: CPTII,S$GLB,, | Performed by: NURSE PRACTITIONER

## 2025-01-30 PROCEDURE — 25000003 PHARM REV CODE 250: Performed by: NURSE PRACTITIONER

## 2025-01-30 PROCEDURE — 99999 PR PBB SHADOW E&M-EST. PATIENT-LVL IV: CPT | Mod: PBBFAC,,, | Performed by: NURSE PRACTITIONER

## 2025-01-30 PROCEDURE — 36415 COLL VENOUS BLD VENIPUNCTURE: CPT | Performed by: NURSE PRACTITIONER

## 2025-01-30 PROCEDURE — 1159F MED LIST DOCD IN RCRD: CPT | Mod: CPTII,S$GLB,, | Performed by: NURSE PRACTITIONER

## 2025-01-30 PROCEDURE — 3075F SYST BP GE 130 - 139MM HG: CPT | Mod: CPTII,S$GLB,, | Performed by: NURSE PRACTITIONER

## 2025-01-30 PROCEDURE — 63600175 PHARM REV CODE 636 W HCPCS: Performed by: NURSE PRACTITIONER

## 2025-01-30 PROCEDURE — 3008F BODY MASS INDEX DOCD: CPT | Mod: CPTII,S$GLB,, | Performed by: NURSE PRACTITIONER

## 2025-01-30 PROCEDURE — A4216 STERILE WATER/SALINE, 10 ML: HCPCS | Performed by: NURSE PRACTITIONER

## 2025-01-30 RX ORDER — SODIUM CHLORIDE 0.9 % (FLUSH) 0.9 %
10 SYRINGE (ML) INJECTION
OUTPATIENT
Start: 2025-01-30

## 2025-01-30 RX ORDER — SODIUM CHLORIDE 0.9 % (FLUSH) 0.9 %
10 SYRINGE (ML) INJECTION
Status: DISCONTINUED | OUTPATIENT
Start: 2025-01-30 | End: 2025-01-30 | Stop reason: HOSPADM

## 2025-01-30 RX ORDER — SODIUM CHLORIDE 0.9 % (FLUSH) 0.9 %
10 SYRINGE (ML) INJECTION
Status: CANCELLED | OUTPATIENT
Start: 2025-01-30

## 2025-01-30 RX ORDER — HEPARIN 100 UNIT/ML
500 SYRINGE INTRAVENOUS
OUTPATIENT
Start: 2025-01-30

## 2025-01-30 RX ORDER — HEPARIN 100 UNIT/ML
500 SYRINGE INTRAVENOUS
Status: DISCONTINUED | OUTPATIENT
Start: 2025-01-30 | End: 2025-01-30 | Stop reason: HOSPADM

## 2025-01-30 RX ORDER — HEPARIN 100 UNIT/ML
500 SYRINGE INTRAVENOUS
Status: CANCELLED | OUTPATIENT
Start: 2025-01-30

## 2025-01-30 RX ADMIN — SODIUM CHLORIDE, PRESERVATIVE FREE 10 ML: 5 INJECTION INTRAVENOUS at 11:01

## 2025-01-30 RX ADMIN — HEPARIN 500 UNITS: 100 SYRINGE at 11:01

## 2025-01-30 NOTE — PROGRESS NOTES
Subjective:       Patient ID: Ruben Clements is a 40 y.o. female.    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     **An addendum to the pathology report from 9/7/2021 internal 9/17/2021 states that the pathologist and the submitting physician's physician assistant had a discussion, and the possibility that the ovary is a metastatic lesion from the endometrium was discussed with a metastasis to the ovary being favored by the clinician.  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     Current Treatment:   Started letrozole 07/23/2024.      Treatment History:  Adjuvant carboplatin and paclitaxel given every 3 weeks for 6 cycles, started on 10/27/2021.  Completed on 2/16/2022.    Carboplatin and paclitaxel x 6 cycles (since recurrence was greater than a year since her last carbo/taxol dose) started on 03/13/2024 and completed on 06/26/2024.   Radiation dates: 08/22/2024-09/05/2024    HPI:  Patient who was having irregular menses, pelvic pain, abdominal pain along with nausea and vomiting.  She presented to see her OB/GYN, imaging was done that showed ovarian cyst.  She underwent a right oophorectomy on 6/8/2021, pathology revealed a 15 cm well-differentiated endometrioid adenocarcinoma involving the right ovary, right fallopian tube was uninvolved.  She was then referred to Dr. Darryn Pires with GYN oncology in [place], [place].  On 7/16/2021, she underwent Terra hereditary cancer test, this was  negative for pathogenic mutations.  Her [facility] breast cancer risk assessment was 13.9% (general population was 13.2%, no significant increased risk compared to general population).  She underwent an endometrial biopsy on 8/18/2021 that revealed FIGO grade 1 endometrioid endometrial adenocarcinoma.  She then underwent a robotic hysterectomy, left oophorectomy, bilateral pelvic sentinel node dissection with partial omentectomy and peritoneal biopsies of cul-de-sac nodules.  Pathology revealed endometrioid endometrial adenocarcinoma, FIGO 1 with less than 50% invasion.  Margins were negative, lymph nodes were negative, cul-de-sac nodules were positive for metastatic endometrial carcinoma.  Originally, it was discussed that the cul-de-sac lesions were from her ovarian cancer, this would stage her right ovarian cancer as a pT2b, N0, M0 stage IIB and would stage her endometrial cancer as a pT1a, N0, M0 stage IA.  Per an addendum on 9/17/2021, further discussion suggested a pT3a, N0, M0 stage IIIA endometrial cancer.  Patient was seen by Dr. Velazquez on 9/23/2021, plan was to treat with adjuvant carbo Taxol for 6 cycles.  Due to the fact that the patient lives in [place], [place], it was decided to refer to an oncologist in [place], [place] as this was closer to home.  I initially saw the patient on 10/18/2021 at that visit, she stated to overall be feeling well.  She did have some numbness and tingling, also some depression and anxiety, but had no other major issues to discuss.  Patient started her treatment on 10/27/2021, completed on 2/16/2022.  Surveillance CT scans of the chest, abdomen, and pelvis started on 4/11/2022, these showed no evidence of disease. Most recently done on 08/14/2023 showing interim increase in size of the small nodule at the right suprahilar region, previously measuring 3-4 mm, now measuring 6-7 mm. CT scan of the chest, abdomen, and pelvis done on 11/22/2023 Enlarging right apical  lung nodule now measuring 1.1 x 1.0 cm.  There was a new 1.0 cm soft tissue nodule in the fat and musculature of the left lateral lower abdominal wall.    PET/CT scan done on 12/06/2023 showed hypermetabolic 1.0 cm solid right upper lobe nodule with 2 hypermetabolic lymph nodes, 1 in the right axilla and 1 in the portacaval system.  There was a 1.1 cm nodule in the left lower anterior abdominal wall that was also hypermetabolic.    Right axillary lymph node done on 12/11/2023 showed benign findings with no evidence of malignancy.  There was still concern that malignancy was present, patient ended up at [facility].    Left lower abdominal wall nodule biopsy done on 01/25/2024 at [facility] revealed fragments of low-grade endometrioid adenocarcinoma.    She then underwent lung biopsy of the right upper lobe lung nodule at [facility] on 02/20/2024, this returned positive for metastatic endometrial carcinoma.    Started carboplatin and paclitaxel on 03/13/2024. Cycle 6 received on 06/26/2024    Letrozole started on 07/23/2024.    Most recent CT scans done on 1/27/25 showed no evidence of disease in the chest, abdomen, or pelvis.    She will follow up with MD Ruiz on 01/28/2025    Interval History:   Patient presents to clinic for scheduled follow up appointment.  She recently saw MD Ruiz on 1/28/25, scans showed no evidence of disease.  She is no longer taking letrozole due to issues with joint pains.  She states that she feels well overall and feels that she is ready to go back to work.  She denies any new complaints.        Past Medical History:   Diagnosis Date    CVA (cerebral vascular accident)     Endometrial cancer 08/18/2021    Malignant neoplasm of right ovary 06/08/2021      Past Surgical History:   Procedure Laterality Date    ADENOIDECTOMY      CHOLECYSTECTOMY      HYSTERECTOMY      SALPINGOOPHORECTOMY      TONSILLECTOMY       Social History     Socioeconomic History    Marital status: Single    Tobacco Use    Smoking status: Never    Smokeless tobacco: Never   Substance and Sexual Activity    Alcohol use: Not Currently     Comment: 1-2 times per month    Drug use: Never    Sexual activity: Not Currently     Birth control/protection: Abstinence      Family History   Problem Relation Name Age of Onset    Diabetes Mother Aida Clements     Hypertension Mother Aida Clements     Depression Mother Aida Clements     Depression Father Cristobal Clements     Diabetes Father Cristobal Clements     Early death Father Cristobal Clements         Heart failure    Stroke Father Cristobal Clements     Depression Sister Radha Clements-Quan     Depression Sister Haily Clements     Diabetes Sister Haily Clements     COPD Maternal Grandmother Sudha Rojas         CHF and COPD    Colon cancer Maternal Grandfather Denilson Rojas 60    Prostate cancer Maternal Grandfather Denilson Rojas 60    Stomach cancer Maternal Great-Grandmother        Review of patient's allergies indicates:   Allergen Reactions    Carboplatin Itching, Palpitations and Shortness Of Breath      Review of Systems   Constitutional:  Negative for appetite change and unexpected weight change.   HENT:  Negative for mouth sores.    Eyes:  Negative for visual disturbance.   Respiratory:  Negative for cough and shortness of breath.    Cardiovascular:  Negative for chest pain.   Gastrointestinal:  Negative for abdominal pain and diarrhea.   Genitourinary:  Negative for frequency.   Musculoskeletal:  Negative for back pain.   Integumentary:  Negative for rash.   Neurological:  Negative for headaches.   Hematological:  Negative for adenopathy.   Psychiatric/Behavioral:  The patient is not nervous/anxious.          Objective:      Physical Exam  Vitals reviewed.   Constitutional:       General: She is not in acute distress.     Appearance: Normal appearance.   HENT:      Head: Normocephalic and atraumatic.      Nose: Nose normal.      Mouth/Throat:      Mouth: Mucous  membranes are moist.   Eyes:      Extraocular Movements: Extraocular movements intact.      Conjunctiva/sclera: Conjunctivae normal.   Cardiovascular:      Rate and Rhythm: Normal rate and regular rhythm.      Pulses: Normal pulses.      Heart sounds: Normal heart sounds.   Pulmonary:      Effort: Pulmonary effort is normal.      Breath sounds: Normal breath sounds.   Musculoskeletal:         General: No swelling. Normal range of motion.      Cervical back: Normal range of motion and neck supple.      Right lower leg: No edema.      Left lower leg: No edema.   Skin:     General: Skin is warm and dry.   Neurological:      General: No focal deficit present.      Mental Status: She is alert and oriented to person, place, and time. Mental status is at baseline.   Psychiatric:         Mood and Affect: Mood normal.         Behavior: Behavior normal.         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/2021 with surgery (cul-de-sac nodules found during completion hysterectomy and salpingo-oophorectomy)  pT1a, N0, M0 stage IA endometrial cancer diagnosed 8/18/2021   Recurrent endometrial cancer of a left abdominal wall mass and right lung as of January 2024 in February 2024 respectively     **An addendum to the pathology report from 9/7/2021 internal 9/17/2021 states that the pathologist and the submitting physician's physician assistant had a discussion, and the possibility that the ovary is a metastatic lesion from the endometrium was discussed with a metastasis to the ovary being favored by the clinician.  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        Plan:     Patient completed 6 cycles of adjuvant carboplatin and paclitaxel on 02/16/2022.     CT scan on 11/22/2023 showed increasing size of the right apical nodule, now measuring 1.0 x 1.1 cm.  There  was also a new 1.0 cm nodule in the left abdominal wall.    PET/CT scan done on 12/06/2023 shows hypermetabolic right lung nodule, left abdominal wall nodule, right axillary lymph node and portacaval lymph node.    Right axillary lymph node done on 12/11/2023 benign    Left lower abdominal wall nodule biopsy done on 01/25/2024 at [facility] showed recurrent endometrial cancer.    The lung lesion was positive on 02/20/2024 for metastatic endometrial carcinoma.  The patient discussed with their gyn Oncology team possible treatment options, they recommended systemic therapy with carboplatin and paclitaxel.  The patient would like to treat with systemic therapy.    Carboplatin and paclitaxel x 6 cycles started on 03/13/2024 and completed on 06/26/2024.    CT scans of the chest, abdomen, and pelvis done on 07/18/2024 @ [facility] showed resolution of a right upper lobe pulmonary metastasis. A presumed metastatic deposit in the left lateral abdominal wall has also become smaller. Hepatic steatosis and hepatomegaly.     [facility] recommendation is for letrozole maintenance given her ER/[place] tumor status.  She started on 07/23/2024.  She was unable to tolerate this due to pain in her hands therefore discontinued.    Underwent XRT to the abdominal wall lesion from 08/22/2024 through 09/05/2024    Most recent CT scans done on 1/27/25 showed no evidence of disease.    Return to clinic in 3 months after she sees MD Ruiz.    Sammie flush today    Labs: CBC, CMP, mag, and      All questions were answered to the best of my ability and she understands the plan moving forward.    SILVIA Raymundo

## 2025-01-30 NOTE — NURSING
MP flush and heparin locked; d/c home in stable condition, patient prefers MyChart for future appointments.

## 2025-01-30 NOTE — LETTER
January 30, 2025      [facility]  [address], [address]  [place] [address]  Phone: [phone]       Patient: Ruben Clements   YOB: 1985  Date of Visit: 01/30/2025    To Whom It May Concern:    Branden Clements  was at [facility] on 01/30/2025. The patient may return to work on 02/03/2025 with no restrictions. If you have any questions or concerns, or if I can be of further assistance, please do not hesitate to contact me.        Sincerely,      SILVIA Raymundo

## 2025-02-21 DIAGNOSIS — Z12.31 OTHER SCREENING MAMMOGRAM: Primary | ICD-10-CM

## 2025-03-11 ENCOUNTER — PATIENT MESSAGE (OUTPATIENT)
Dept: HEMATOLOGY/ONCOLOGY | Facility: CLINIC | Age: 40
End: 2025-03-11
Payer: COMMERCIAL

## 2025-03-12 DIAGNOSIS — C54.1 ENDOMETRIAL CANCER: Primary | ICD-10-CM

## 2025-03-12 DIAGNOSIS — Z79.899 ENCOUNTER FOR LONG-TERM (CURRENT) DRUG USE: ICD-10-CM

## 2025-03-20 ENCOUNTER — HOSPITAL ENCOUNTER (OUTPATIENT)
Dept: RADIOLOGY | Facility: HOSPITAL | Age: 40
Discharge: HOME | End: 2025-03-20
Attending: FAMILY MEDICINE
Payer: COMMERCIAL

## 2025-03-20 DIAGNOSIS — Z12.31 OTHER SCREENING MAMMOGRAM: ICD-10-CM

## 2025-03-20 PROCEDURE — 77063 BREAST TOMOSYNTHESIS BI: CPT | Mod: TC

## 2025-04-23 ENCOUNTER — PATIENT MESSAGE (OUTPATIENT)
Dept: HEMATOLOGY/ONCOLOGY | Facility: CLINIC | Age: 40
End: 2025-04-23
Payer: COMMERCIAL

## 2025-05-01 ENCOUNTER — OFFICE VISIT (OUTPATIENT)
Dept: HEMATOLOGY/ONCOLOGY | Facility: CLINIC | Age: 40
End: 2025-05-01
Payer: COMMERCIAL

## 2025-05-01 ENCOUNTER — LAB VISIT (OUTPATIENT)
Dept: LAB | Facility: HOSPITAL | Age: 40
End: 2025-05-01
Attending: NURSE PRACTITIONER
Payer: COMMERCIAL

## 2025-05-01 VITALS
SYSTOLIC BLOOD PRESSURE: 127 MMHG | HEIGHT: 62 IN | WEIGHT: 251.31 LBS | BODY MASS INDEX: 46.25 KG/M2 | OXYGEN SATURATION: 99 % | DIASTOLIC BLOOD PRESSURE: 80 MMHG | HEART RATE: 67 BPM | RESPIRATION RATE: 18 BRPM

## 2025-05-01 DIAGNOSIS — C54.1 ENDOMETRIAL CANCER: ICD-10-CM

## 2025-05-01 DIAGNOSIS — Z79.899 ENCOUNTER FOR LONG-TERM (CURRENT) DRUG USE: ICD-10-CM

## 2025-05-01 DIAGNOSIS — C56.1 MALIGNANT NEOPLASM OF RIGHT OVARY: Primary | ICD-10-CM

## 2025-05-01 DIAGNOSIS — C78.01 MALIGNANT NEOPLASM METASTATIC TO RIGHT LUNG: ICD-10-CM

## 2025-05-01 LAB
ALBUMIN SERPL-MCNC: 3.7 G/DL (ref 3.5–5)
ALBUMIN/GLOB SERPL: 1.1 RATIO (ref 1.1–2)
ALP SERPL-CCNC: 103 UNIT/L (ref 40–150)
ALT SERPL-CCNC: 29 UNIT/L (ref 0–55)
ANION GAP SERPL CALC-SCNC: 6 MEQ/L
AST SERPL-CCNC: 19 UNIT/L (ref 11–45)
BASOPHILS # BLD AUTO: 0.04 X10(3)/MCL
BASOPHILS NFR BLD AUTO: 0.5 %
BILIRUB SERPL-MCNC: 0.4 MG/DL
BUN SERPL-MCNC: 11.7 MG/DL (ref 7–18.7)
CALCIUM SERPL-MCNC: 9.3 MG/DL (ref 8.4–10.2)
CANCER AG125 SERPL-ACNC: 8.8 UNIT/ML (ref 0–35)
CHLORIDE SERPL-SCNC: 107 MMOL/L (ref 98–107)
CO2 SERPL-SCNC: 29 MMOL/L (ref 22–29)
CREAT SERPL-MCNC: 0.67 MG/DL (ref 0.55–1.02)
CREAT/UREA NIT SERPL: 17
EOSINOPHIL # BLD AUTO: 0.1 X10(3)/MCL (ref 0–0.9)
EOSINOPHIL NFR BLD AUTO: 1.2 %
ERYTHROCYTE [DISTWIDTH] IN BLOOD BY AUTOMATED COUNT: 12.5 % (ref 11.5–17)
GFR SERPLBLD CREATININE-BSD FMLA CKD-EPI: >60 ML/MIN/1.73/M2
GLOBULIN SER-MCNC: 3.4 GM/DL (ref 2.4–3.5)
GLUCOSE SERPL-MCNC: 74 MG/DL (ref 74–100)
HCT VFR BLD AUTO: 41.6 % (ref 37–47)
HGB BLD-MCNC: 13.6 G/DL (ref 12–16)
IMM GRANULOCYTES # BLD AUTO: 0.03 X10(3)/MCL (ref 0–0.04)
IMM GRANULOCYTES NFR BLD AUTO: 0.4 %
LYMPHOCYTES # BLD AUTO: 1.53 X10(3)/MCL (ref 0.6–4.6)
LYMPHOCYTES NFR BLD AUTO: 18.7 %
MAGNESIUM SERPL-MCNC: 2 MG/DL (ref 1.6–2.6)
MCH RBC QN AUTO: 30.5 PG (ref 27–31)
MCHC RBC AUTO-ENTMCNC: 32.7 G/DL (ref 33–36)
MCV RBC AUTO: 93.3 FL (ref 80–94)
MONOCYTES # BLD AUTO: 0.51 X10(3)/MCL (ref 0.1–1.3)
MONOCYTES NFR BLD AUTO: 6.2 %
NEUTROPHILS # BLD AUTO: 5.99 X10(3)/MCL (ref 2.1–9.2)
NEUTROPHILS NFR BLD AUTO: 73 %
PLATELET # BLD AUTO: 327 X10(3)/MCL (ref 130–400)
PMV BLD AUTO: 9.5 FL (ref 7.4–10.4)
POTASSIUM SERPL-SCNC: 4 MMOL/L (ref 3.5–5.1)
PROT SERPL-MCNC: 7.1 GM/DL (ref 6.4–8.3)
RBC # BLD AUTO: 4.46 X10(6)/MCL (ref 4.2–5.4)
SODIUM SERPL-SCNC: 142 MMOL/L (ref 136–145)
TSH SERPL-ACNC: 1.76 UIU/ML (ref 0.35–4.94)
WBC # BLD AUTO: 8.2 X10(3)/MCL (ref 4.5–11.5)

## 2025-05-01 PROCEDURE — 86304 IMMUNOASSAY TUMOR CA 125: CPT

## 2025-05-01 PROCEDURE — 1160F RVW MEDS BY RX/DR IN RCRD: CPT | Mod: CPTII,S$GLB,, | Performed by: INTERNAL MEDICINE

## 2025-05-01 PROCEDURE — 99999 PR PBB SHADOW E&M-EST. PATIENT-LVL IV: CPT | Mod: PBBFAC,,, | Performed by: INTERNAL MEDICINE

## 2025-05-01 PROCEDURE — G2211 COMPLEX E/M VISIT ADD ON: HCPCS | Mod: S$GLB,,, | Performed by: INTERNAL MEDICINE

## 2025-05-01 PROCEDURE — 84443 ASSAY THYROID STIM HORMONE: CPT

## 2025-05-01 PROCEDURE — 3079F DIAST BP 80-89 MM HG: CPT | Mod: CPTII,S$GLB,, | Performed by: INTERNAL MEDICINE

## 2025-05-01 PROCEDURE — 83735 ASSAY OF MAGNESIUM: CPT

## 2025-05-01 PROCEDURE — 99214 OFFICE O/P EST MOD 30 MIN: CPT | Mod: S$GLB,,, | Performed by: INTERNAL MEDICINE

## 2025-05-01 PROCEDURE — 1159F MED LIST DOCD IN RCRD: CPT | Mod: CPTII,S$GLB,, | Performed by: INTERNAL MEDICINE

## 2025-05-01 PROCEDURE — 85025 COMPLETE CBC W/AUTO DIFF WBC: CPT

## 2025-05-01 PROCEDURE — 3008F BODY MASS INDEX DOCD: CPT | Mod: CPTII,S$GLB,, | Performed by: INTERNAL MEDICINE

## 2025-05-01 PROCEDURE — 3074F SYST BP LT 130 MM HG: CPT | Mod: CPTII,S$GLB,, | Performed by: INTERNAL MEDICINE

## 2025-05-01 PROCEDURE — 80053 COMPREHEN METABOLIC PANEL: CPT

## 2025-05-01 PROCEDURE — 36415 COLL VENOUS BLD VENIPUNCTURE: CPT

## 2025-05-01 NOTE — PROGRESS NOTES
Subjective:       Patient ID: Ruben Clements is a 40 y.o. female.    Chief Complaint: Follow-up (Patient has no concerns today)      Medical oncologist in Center:  Jonas Velazquez MD  GYN/ONC:  Darryn Pires MD     Diagnosis:  pT2b, N0, M0 stage IIB (due to cul-de-sac nodules) right ovarian cancer diagnosed 6/8/2021 with surgery (cul-de-sac nodules found during completion hysterectomy and salpingo-oophorectomy)  pT1a, N0, M0 stage IA endometrial cancer diagnosed 8/18/2021  Recurrence in the left abdominal sidewall diagnosed 01/25/2024 via biopsy at [facility].     **An addendum to the pathology report from 9/7/2021 internal 9/17/2021 states that the pathologist and the submitting physician's physician assistant had a discussion, and the possibility that the ovary is a metastatic lesion from the endometrium was discussed with a metastasis to the ovary being favored by the clinician.  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     Current Treatment:   Observation    Treatment History:  Adjuvant carboplatin and paclitaxel given every 3 weeks for 6 cycles, started on 10/27/2021.  Completed on 2/16/2022.    Carboplatin and paclitaxel x 6 cycles (since recurrence was greater than a year since her last carbo/taxol dose) started on 03/13/2024 and completed on 06/26/2024.   Radiation dates: 08/22/2024 through 09/05/2024  Started letrozole 07/23/2024, subsequently stopped due to intolerance    HPI:  Patient who was having irregular menses, pelvic pain, abdominal pain along with nausea and vomiting.  She presented to see her OB/GYN, imaging was done that showed ovarian cyst.  She underwent a right oophorectomy on 6/8/2021, pathology revealed a 15 cm well-differentiated endometrioid adenocarcinoma involving the right ovary, right fallopian tube was uninvolved.  She was then referred to Dr. Darryn Pires with GYN oncology in [place], [place].  On  7/16/2021, she underwent Terra hereditary cancer test, this was negative for pathogenic mutations.  Her [facility] breast cancer risk assessment was 13.9% (general population was 13.2%, no significant increased risk compared to general population).  She underwent an endometrial biopsy on 8/18/2021 that revealed FIGO grade 1 endometrioid endometrial adenocarcinoma.  She then underwent a robotic hysterectomy, left oophorectomy, bilateral pelvic sentinel node dissection with partial omentectomy and peritoneal biopsies of cul-de-sac nodules.  Pathology revealed endometrioid endometrial adenocarcinoma, FIGO 1 with less than 50% invasion.  Margins were negative, lymph nodes were negative, cul-de-sac nodules were positive for metastatic endometrial carcinoma.  Originally, it was discussed that the cul-de-sac lesions were from her ovarian cancer, this would stage her right ovarian cancer as a pT2b, N0, M0 stage IIB and would stage her endometrial cancer as a pT1a, N0, M0 stage IA.  Per an addendum on 9/17/2021, further discussion suggested a pT3a, N0, M0 stage IIIA endometrial cancer.  Patient was seen by Dr. Velazquez on 9/23/2021, plan was to treat with adjuvant carbo Taxol for 6 cycles.  Due to the fact that the patient lives in [place], [place], it was decided to refer to an oncologist in [place], [place] as this was closer to home.  I initially saw the patient on 10/18/2021 at that visit, she stated to overall be feeling well.  She did have some numbness and tingling, also some depression and anxiety, but had no other major issues to discuss.  Patient started her treatment on 10/27/2021, completed on 2/16/2022.  Surveillance CT scans of the chest, abdomen, and pelvis started on 4/11/2022, these showed no evidence of disease. Most recently done on 08/14/2023 showing interim increase in size of the small nodule at the right suprahilar region, previously measuring 3-4 mm, now measuring 6-7 mm. CT scan of the chest,  abdomen, and pelvis done on 11/22/2023 Enlarging right apical lung nodule now measuring 1.1 x 1.0 cm.  There was a new 1.0 cm soft tissue nodule in the fat and musculature of the left lateral lower abdominal wall.    PET/CT scan done on 12/06/2023 showed hypermetabolic 1.0 cm solid right upper lobe nodule with 2 hypermetabolic lymph nodes, 1 in the right axilla and 1 in the portacaval system.  There was a 1.1 cm nodule in the left lower anterior abdominal wall that was also hypermetabolic.    Right axillary lymph node done on 12/11/2023 showed benign findings with no evidence of malignancy.  There was still concern that malignancy was present, patient ended up at [facility].    Left lower abdominal wall nodule biopsy done on 01/25/2024 at [facility] revealed fragments of low-grade endometrioid adenocarcinoma.    She then underwent lung biopsy of the right upper lobe lung nodule at [facility] on 02/20/2024, this returned positive for metastatic endometrial carcinoma.    Started carboplatin and paclitaxel on 03/13/2024. Cycle 6 received on 06/26/2024    Letrozole started on 07/23/2024.  Subsequently stopped due to intolerance.    Most recent CT scans done on 04/29/2025 at [facility] showed no evidence of disease in the chest, abdomen, or pelvis.        Interval History:   Patient presents to clinic for scheduled follow up appointment.  She recently saw MD Joseph, scans showed no evidence of disease. She does have some insomnia. Otherwise, she states that she feels well overall.          Past Medical History:   Diagnosis Date    CVA (cerebral vascular accident)     Endometrial cancer 08/18/2021    Malignant neoplasm of right ovary 06/08/2021      Past Surgical History:   Procedure Laterality Date    ADENOIDECTOMY      CHOLECYSTECTOMY      HYSTERECTOMY      SALPINGOOPHORECTOMY      TONSILLECTOMY       Social History     Socioeconomic History    Marital status: Single   Tobacco Use    Smoking status: Never     Smokeless tobacco: Never   Substance and Sexual Activity    Alcohol use: Not Currently     Comment: 1-2 times per month    Drug use: Never    Sexual activity: Not Currently     Birth control/protection: Abstinence      Family History   Problem Relation Name Age of Onset    Diabetes Mother Aida Clements     Hypertension Mother Aida Clements     Depression Mother Aida Clements     Depression Father Cristobal Clements     Diabetes Father Cristobal Clements     Early death Father Cristobal Clements         Heart failure    Stroke Father Cristobal Clements     Depression Sister Radha Clements-Quan     Depression Sister Haily Clements     Diabetes Sister Haily Clements     COPD Maternal Grandmother Sudha Rojas         CHF and COPD    Colon cancer Maternal Grandfather Denilson Rojas 60    Prostate cancer Maternal Grandfather Denilson Rojas 60    Stomach cancer Maternal Great-Grandmother        Review of patient's allergies indicates:   Allergen Reactions    Carboplatin Itching, Palpitations and Shortness Of Breath      Review of Systems   Constitutional:  Negative for appetite change and unexpected weight change.   HENT:  Negative for mouth sores.    Eyes:  Negative for visual disturbance.   Respiratory:  Negative for cough and shortness of breath.    Cardiovascular:  Negative for chest pain.   Gastrointestinal:  Negative for abdominal pain and diarrhea.   Genitourinary:  Negative for frequency.   Musculoskeletal:  Negative for back pain.   Integumentary:  Negative for rash.   Neurological:  Negative for headaches.   Hematological:  Negative for adenopathy.   Psychiatric/Behavioral:  The patient is not nervous/anxious.          Objective:      Physical Exam  Vitals reviewed.   Constitutional:       General: She is not in acute distress.     Appearance: Normal appearance.   HENT:      Head: Normocephalic and atraumatic.      Nose: Nose normal.      Mouth/Throat:      Mouth: Mucous membranes are moist.   Eyes:      Extraocular  Movements: Extraocular movements intact.      Conjunctiva/sclera: Conjunctivae normal.   Cardiovascular:      Rate and Rhythm: Normal rate and regular rhythm.      Pulses: Normal pulses.      Heart sounds: Normal heart sounds.   Pulmonary:      Effort: Pulmonary effort is normal.      Breath sounds: Normal breath sounds.   Musculoskeletal:         General: No swelling. Normal range of motion.      Cervical back: Normal range of motion and neck supple.      Right lower leg: No edema.      Left lower leg: No edema.   Skin:     General: Skin is warm and dry.   Neurological:      General: No focal deficit present.      Mental Status: She is alert and oriented to person, place, and time. Mental status is at baseline.   Psychiatric:         Mood and Affect: Mood normal.         Behavior: Behavior normal.         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/2021 with surgery (cul-de-sac nodules found during completion hysterectomy and salpingo-oophorectomy)  pT1a, N0, M0 stage IA endometrial cancer diagnosed 8/18/2021   Recurrent endometrial cancer of a left abdominal wall mass and right lung as of January 2024 in February 2024 respectively     **An addendum to the pathology report from 9/7/2021 internal 9/17/2021 states that the pathologist and the submitting physician's physician assistant had a discussion, and the possibility that the ovary is a metastatic lesion from the endometrium was discussed with a metastasis to the ovary being favored by the clinician.  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        Plan:     Patient completed 6 cycles of adjuvant carboplatin and paclitaxel on 02/16/2022.     CT scan on 11/22/2023 showed increasing size of the right apical nodule, now measuring 1.0 x 1.1 cm.  There was also a new 1.0 cm nodule in the left  abdominal wall.    PET/CT scan done on 12/06/2023 shows hypermetabolic right lung nodule, left abdominal wall nodule, right axillary lymph node and portacaval lymph node.    Right axillary lymph node done on 12/11/2023 benign    Left lower abdominal wall nodule biopsy done on 01/25/2024 at [facility] showed recurrent endometrial cancer.    The lung lesion was positive on 02/20/2024 for metastatic endometrial carcinoma.  The patient discussed with their gyn Oncology team possible treatment options, they recommended systemic therapy with carboplatin and paclitaxel.  The patient would like to treat with systemic therapy.    Carboplatin and paclitaxel x 6 cycles started on 03/13/2024 and completed on 06/26/2024.    CT scans of the chest, abdomen, and pelvis done on 07/18/2024 @ [facility] showed resolution of a right upper lobe pulmonary metastasis. A presumed metastatic deposit in the left lateral abdominal wall has also become smaller. Hepatic steatosis and hepatomegaly.     [facility] recommendation is for letrozole maintenance given her ER/NM tumor status.  She started on 07/23/2024.  She was unable to tolerate this due to pain in her hands therefore discontinued.    Underwent XRT to the abdominal wall lesion from 08/22/2024 through 09/05/2024    Most recent CT scans done on 04/29/2025 showed no evidence of disease.    Return to clinic in 3 months after she sees MD Ruiz.    [facility] tomorrow    Labs: CBC, CMP, mag, and      All questions were answered to the best of my ability and she understands the plan moving forward.    Visit today included increased complexity associated with the care of the episodic problem ovarian cancer, addressing and managing the longitudinal care of the patient's ovarian cancer.       Dank Raymundo II, MD

## 2025-05-02 ENCOUNTER — INFUSION (OUTPATIENT)
Dept: INFUSION THERAPY | Facility: HOSPITAL | Age: 40
End: 2025-05-02
Attending: INTERNAL MEDICINE
Payer: COMMERCIAL

## 2025-05-02 VITALS
TEMPERATURE: 98 F | SYSTOLIC BLOOD PRESSURE: 104 MMHG | DIASTOLIC BLOOD PRESSURE: 69 MMHG | HEART RATE: 88 BPM | OXYGEN SATURATION: 96 % | RESPIRATION RATE: 20 BRPM

## 2025-05-02 DIAGNOSIS — Z95.828 PORT-A-CATH IN PLACE: Primary | ICD-10-CM

## 2025-05-02 DIAGNOSIS — C56.1 MALIGNANT NEOPLASM OF RIGHT OVARY: ICD-10-CM

## 2025-05-02 DIAGNOSIS — C54.1 ENDOMETRIAL CANCER: ICD-10-CM

## 2025-05-02 DIAGNOSIS — E86.0 DEHYDRATION: ICD-10-CM

## 2025-05-02 PROCEDURE — 96523 IRRIG DRUG DELIVERY DEVICE: CPT

## 2025-05-02 PROCEDURE — 63600175 PHARM REV CODE 636 W HCPCS: Performed by: INTERNAL MEDICINE

## 2025-05-02 RX ORDER — HEPARIN 100 UNIT/ML
500 SYRINGE INTRAVENOUS
Status: DISCONTINUED | OUTPATIENT
Start: 2025-05-02 | End: 2025-05-02 | Stop reason: HOSPADM

## 2025-05-02 RX ORDER — HEPARIN 100 UNIT/ML
500 SYRINGE INTRAVENOUS
OUTPATIENT
Start: 2025-05-02

## 2025-05-02 RX ORDER — SODIUM CHLORIDE 0.9 % (FLUSH) 0.9 %
10 SYRINGE (ML) INJECTION
Status: DISCONTINUED | OUTPATIENT
Start: 2025-05-02 | End: 2025-05-02 | Stop reason: HOSPADM

## 2025-05-02 RX ORDER — SODIUM CHLORIDE 0.9 % (FLUSH) 0.9 %
10 SYRINGE (ML) INJECTION
OUTPATIENT
Start: 2025-05-02

## 2025-05-02 RX ADMIN — HEPARIN 500 UNITS: 100 SYRINGE at 09:05

## 2025-05-19 ENCOUNTER — PATIENT MESSAGE (OUTPATIENT)
Dept: HEMATOLOGY/ONCOLOGY | Facility: CLINIC | Age: 40
End: 2025-05-19
Payer: COMMERCIAL

## 2025-06-22 ENCOUNTER — HOSPITAL ENCOUNTER (EMERGENCY)
Facility: HOSPITAL | Age: 40
Discharge: HOME | End: 2025-06-22
Attending: EMERGENCY MEDICINE
Payer: COMMERCIAL

## 2025-06-22 VITALS
BODY MASS INDEX: 46.01 KG/M2 | HEIGHT: 62 IN | RESPIRATION RATE: 18 BRPM | OXYGEN SATURATION: 98 % | WEIGHT: 250 LBS | SYSTOLIC BLOOD PRESSURE: 119 MMHG | DIASTOLIC BLOOD PRESSURE: 79 MMHG | HEART RATE: 72 BPM | TEMPERATURE: 98 F

## 2025-06-22 DIAGNOSIS — K52.9 GASTROENTERITIS: Primary | ICD-10-CM

## 2025-06-22 DIAGNOSIS — R42 LIGHTHEADEDNESS: ICD-10-CM

## 2025-06-22 LAB
ALBUMIN SERPL-MCNC: 3.7 G/DL (ref 3.5–5)
ALBUMIN/GLOB SERPL: 1.1 RATIO (ref 1.1–2)
ALP SERPL-CCNC: 103 UNIT/L (ref 40–150)
ALT SERPL-CCNC: 32 UNIT/L (ref 0–55)
ANION GAP SERPL CALC-SCNC: 7 MEQ/L
AST SERPL-CCNC: 21 UNIT/L (ref 11–45)
BASOPHILS # BLD AUTO: 0.03 X10(3)/MCL
BASOPHILS NFR BLD AUTO: 0.4 %
BILIRUB SERPL-MCNC: 0.6 MG/DL
BILIRUB UR QL STRIP.AUTO: NEGATIVE
BUN SERPL-MCNC: 7 MG/DL (ref 7–18.7)
CALCIUM SERPL-MCNC: 8.8 MG/DL (ref 8.4–10.2)
CHLORIDE SERPL-SCNC: 109 MMOL/L (ref 98–107)
CLARITY UR: CLEAR
CO2 SERPL-SCNC: 27 MMOL/L (ref 22–29)
COLOR UR AUTO: YELLOW
CREAT SERPL-MCNC: 0.6 MG/DL (ref 0.55–1.02)
CREAT/UREA NIT SERPL: 12
EOSINOPHIL # BLD AUTO: 0.08 X10(3)/MCL (ref 0–0.9)
EOSINOPHIL NFR BLD AUTO: 1.1 %
ERYTHROCYTE [DISTWIDTH] IN BLOOD BY AUTOMATED COUNT: 12.4 % (ref 11.5–17)
GFR SERPLBLD CREATININE-BSD FMLA CKD-EPI: >60 ML/MIN/1.73/M2
GLOBULIN SER-MCNC: 3.5 GM/DL (ref 2.4–3.5)
GLUCOSE SERPL-MCNC: 124 MG/DL (ref 74–100)
GLUCOSE UR QL STRIP: NEGATIVE
HCT VFR BLD AUTO: 42.4 % (ref 37–47)
HGB BLD-MCNC: 13.8 G/DL (ref 12–16)
HGB UR QL STRIP: NEGATIVE
IMM GRANULOCYTES # BLD AUTO: 0.03 X10(3)/MCL (ref 0–0.04)
IMM GRANULOCYTES NFR BLD AUTO: 0.4 %
KETONES UR QL STRIP: NEGATIVE
LEUKOCYTE ESTERASE UR QL STRIP: NEGATIVE
LIPASE SERPL-CCNC: 15 U/L
LYMPHOCYTES # BLD AUTO: 1.19 X10(3)/MCL (ref 0.6–4.6)
LYMPHOCYTES NFR BLD AUTO: 16.4 %
MCH RBC QN AUTO: 29.9 PG (ref 27–31)
MCHC RBC AUTO-ENTMCNC: 32.5 G/DL (ref 33–36)
MCV RBC AUTO: 91.8 FL (ref 80–94)
MONOCYTES # BLD AUTO: 0.44 X10(3)/MCL (ref 0.1–1.3)
MONOCYTES NFR BLD AUTO: 6.1 %
NEUTROPHILS # BLD AUTO: 5.49 X10(3)/MCL (ref 2.1–9.2)
NEUTROPHILS NFR BLD AUTO: 75.6 %
NITRITE UR QL STRIP: NEGATIVE
NRBC BLD AUTO-RTO: 0 %
OHS QRS DURATION: 82 MS
OHS QTC CALCULATION: 445 MS
PH UR STRIP: 5.5 [PH]
PLATELET # BLD AUTO: 303 X10(3)/MCL (ref 130–400)
PMV BLD AUTO: 9.7 FL (ref 7.4–10.4)
POTASSIUM SERPL-SCNC: 3.5 MMOL/L (ref 3.5–5.1)
PROT SERPL-MCNC: 7.2 GM/DL (ref 6.4–8.3)
PROT UR QL STRIP: NEGATIVE
RBC # BLD AUTO: 4.62 X10(6)/MCL (ref 4.2–5.4)
SODIUM SERPL-SCNC: 143 MMOL/L (ref 136–145)
SP GR UR STRIP.AUTO: 1.02 (ref 1–1.03)
UROBILINOGEN UR STRIP-ACNC: 0.2
WBC # BLD AUTO: 7.26 X10(3)/MCL (ref 4.5–11.5)

## 2025-06-22 PROCEDURE — 96374 THER/PROPH/DIAG INJ IV PUSH: CPT

## 2025-06-22 PROCEDURE — 80053 COMPREHEN METABOLIC PANEL: CPT | Performed by: EMERGENCY MEDICINE

## 2025-06-22 PROCEDURE — 83690 ASSAY OF LIPASE: CPT | Performed by: EMERGENCY MEDICINE

## 2025-06-22 PROCEDURE — 96361 HYDRATE IV INFUSION ADD-ON: CPT

## 2025-06-22 PROCEDURE — 85025 COMPLETE CBC W/AUTO DIFF WBC: CPT | Performed by: EMERGENCY MEDICINE

## 2025-06-22 PROCEDURE — 99285 EMERGENCY DEPT VISIT HI MDM: CPT | Mod: 25

## 2025-06-22 PROCEDURE — 81003 URINALYSIS AUTO W/O SCOPE: CPT | Performed by: EMERGENCY MEDICINE

## 2025-06-22 PROCEDURE — 93010 ELECTROCARDIOGRAM REPORT: CPT | Mod: ,,, | Performed by: INTERNAL MEDICINE

## 2025-06-22 PROCEDURE — 93005 ELECTROCARDIOGRAM TRACING: CPT

## 2025-06-22 PROCEDURE — 63600175 PHARM REV CODE 636 W HCPCS: Performed by: EMERGENCY MEDICINE

## 2025-06-22 PROCEDURE — 25000003 PHARM REV CODE 250: Performed by: EMERGENCY MEDICINE

## 2025-06-22 RX ORDER — ONDANSETRON 4 MG/1
4 TABLET, FILM COATED ORAL EVERY 6 HOURS
Qty: 12 TABLET | Refills: 0 | Status: SHIPPED | OUTPATIENT
Start: 2025-06-22

## 2025-06-22 RX ORDER — SODIUM CHLORIDE 9 MG/ML
1000 INJECTION, SOLUTION INTRAVENOUS
Status: COMPLETED | OUTPATIENT
Start: 2025-06-22 | End: 2025-06-22

## 2025-06-22 RX ORDER — ONDANSETRON HYDROCHLORIDE 2 MG/ML
4 INJECTION, SOLUTION INTRAVENOUS
Status: COMPLETED | OUTPATIENT
Start: 2025-06-22 | End: 2025-06-22

## 2025-06-22 RX ADMIN — ONDANSETRON 4 MG: 2 INJECTION INTRAMUSCULAR; INTRAVENOUS at 11:06

## 2025-06-22 RX ADMIN — SODIUM CHLORIDE 1000 ML: 9 INJECTION, SOLUTION INTRAVENOUS at 11:06

## 2025-06-22 NOTE — ED PROVIDER NOTES
Encounter Date: 6/22/2025       History     Chief Complaint   Patient presents with    Dizziness     Dizziness and states she is off balanced. Also reports nausea. States this started at 0930. Ambulated with steady gait to triage. Hx stroke.     HPI  40-year-old female history of previous TIA now with complaints of feeling dizzy, off balance and nausea vomiting and diarrhea since 09/30 last night.  Patient states when she had a TIA in the past she has some numbness on the right side but she does not have this complaint on this presentation.  No history of trauma.  Patient denies associated headache, vision changes, comprehension difficulties, change in speech, fever, chills, chest pain, shortness of breath or abdominal pain.  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/08/2021     Past Surgical History:   Procedure Laterality Date    ADENOIDECTOMY      CHOLECYSTECTOMY      HYSTERECTOMY      SALPINGOOPHORECTOMY      TONSILLECTOMY       Family History   Problem Relation Name Age of Onset    Diabetes Mother Aida Clements     Hypertension Mother Aida Clements     Depression Mother Aida Clements     Depression Father Cristobal Clements     Diabetes Father Cristobal Clements     Early death Father Cristobal Clements         Heart failure    Stroke Father Cristobal Ymei Clements     Depression Sister Radhaingrid Clements-Quan     Depression Sister Haily Clements     Diabetes Sister Haily Clements     COPD Maternal Grandmother Sudha Rojas         CHF and COPD    Colon cancer Maternal Grandfather Denilson Rojas 60    Prostate cancer Maternal Grandfather  Denilson Roajs 60    Stomach cancer Maternal Great-Grandmother       Social History[1]  Review of Systems   All other systems reviewed and are negative.      Physical Exam     Initial Vitals [06/22/25 1054]   BP Pulse Resp Temp SpO2   125/84 79 18 97.5 °F (36.4 °C) 95 %      MAP       --         Physical Exam    Nursing note and vitals reviewed.  Constitutional: She appears well-developed and well-nourished. She is cooperative.   HENT:   Head: Normocephalic and atraumatic.   Eyes: Conjunctivae and lids are normal.   Neck: Trachea normal. Neck supple.   Normal range of motion.  Cardiovascular:  Normal rate, regular rhythm, normal heart sounds and intact distal pulses.     Exam reveals no gallop and no friction rub.       No murmur heard.  Pulmonary/Chest: Breath sounds normal. No respiratory distress. She has no wheezes. She has no rhonchi. She has no rales. She exhibits no tenderness.   Abdominal: Abdomen is soft. Bowel sounds are normal. There is no abdominal tenderness.   Musculoskeletal:         General: Normal range of motion.      Cervical back: Normal range of motion and neck supple.     Neurological: She is alert and oriented to person, place, and time. She has normal strength. She displays normal reflexes. No cranial nerve deficit or sensory deficit. GCS score is 15. GCS eye subscore is 4. GCS verbal subscore is 5. GCS motor subscore is 6.   Normal speech, normal comprehension, no ataxic gait, normal cerebellar tests, NIH 0   Skin: Skin is warm and dry. No rash noted.   Psychiatric: She has a normal mood and affect. Her speech is normal and behavior is normal. Judgment and thought content normal.         ED Course   Procedures  Labs Reviewed   COMPREHENSIVE METABOLIC PANEL - Abnormal       Result Value    Sodium 143      Potassium 3.5      Chloride 109 (*)     CO2 27      Glucose 124 (*)     Blood Urea Nitrogen 7.0      Creatinine 0.60      Calcium 8.8      Protein Total 7.2      Albumin 3.7      Globulin 3.5       Albumin/Globulin Ratio 1.1      Bilirubin Total 0.6            ALT 32      AST 21      eGFR >60      Anion Gap 7.0      BUN/Creatinine Ratio 12     CBC WITH DIFFERENTIAL - Abnormal    WBC 7.26      RBC 4.62      Hgb 13.8      Hct 42.4      MCV 91.8      MCH 29.9      MCHC 32.5 (*)     RDW 12.4      Platelet 303      MPV 9.7      Neut % 75.6      Lymph % 16.4      Mono % 6.1      Eos % 1.1      Basophil % 0.4      Imm Grans % 0.4      Neut # 5.49      Lymph # 1.19      Mono # 0.44      Eos # 0.08      Baso # 0.03      Imm Gran # 0.03      NRBC% 0.0     LIPASE - Normal    Lipase Level 15     URINALYSIS - Normal    Color, UA Yellow      Appearance, UA Clear      Specific Gravity, UA 1.025      pH, UA 5.5      Protein, UA Negative      Glucose, UA Negative      Ketones, UA Negative      Blood, UA Negative      Bilirubin, UA Negative      Urobilinogen, UA 0.2      Nitrites, UA Negative      Leukocyte Esterase, UA Negative     CBC W/ AUTO DIFFERENTIAL    Narrative:     The following orders were created for panel order CBC auto differential.  Procedure                               Abnormality         Status                     ---------                               -----------         ------                     CBC with Differential[[id number]]       Abnormal            Final result                 Please view results for these tests on the individual orders.     EKG Readings: (Independently Interpreted)   Normal sinus rhythm 64, no acute ST elevation or ST depression, normal axis.     ECG Results              EKG 12-lead (Final result)        Collection Time Result Time QRS Duration OHS QTC Calculation    06/22/25 11:16:15 06/22/25 15:16:37 82 445                     Final result by Interface, Lab In [facility] (06/22/25 15:16:40)                   Narrative:    Test Reason : R42,    Vent. Rate :  64 BPM     Atrial Rate :  64 BPM     P-R Int : 122 ms          QRS Dur :  82 ms      QT Int : 432 ms       P-R-T Axes  :  40  38  40 degrees    QTcB Int : 445 ms    Normal sinus rhythm  Normal ECG  No previous ECGs available  Confirmed by Sahil An ([id number]) on 6/22/2025 3:16:34 PM    Referred By: WILBERERRAL SELF           Confirmed By: Sahil An                                  Imaging Results              CT Head Without Contrast (Final result)  Result time 06/22/25 11:39:36      Final result by Tommie Diggs MD (06/22/25 11:39:36)                   Impression:      No acute intracranial abnormality identified.      Electronically signed by: Tommie Diggs  Date:    06/22/2025  Time:    11:39               Narrative:    EXAMINATION:  CT HEAD WITHOUT CONTRAST    CLINICAL HISTORY:  Dizziness, persistent/recurrent, cardiac or vascular cause suspected;    TECHNIQUE:  Low dose axial CT images obtained throughout the head without intravenous contrast. Sagittal and coronal reconstructions were performed.    COMPARISON:  MRI 11/28/2014    FINDINGS:  Intracranial compartment:    Ventricles and sulci are normal in size for age without evidence of hydrocephalus. No extra-axial blood or fluid collections.    No acute hemorrhage or evolving major vascular distribution infarct identified.  No mass effect or midline shift.    Skull/extracranial contents (limited evaluation): No fracture. Mastoid air cells and paranasal sinuses are essentially clear.                                       Medications   0.9% NaCl infusion (0 mLs Intravenous Stopped 6/22/25 1258)   ondansetron injection 4 mg (4 mg Intravenous Given 6/22/25 1156)     Medical Decision Making  Amount and/or Complexity of Data Reviewed  Labs: ordered.  Radiology: ordered.    Risk  Prescription drug management.    40-year-old female history of uterine ovarian cancer, previous TIA now with complaints of feeling lightheaded with nausea vomiting diarrhea and feeling off balance since 9:30 p.m. last night.  Vital signs stable in ED, afebrile, O2 sat 95% on room air.  On  exam patient has lungs are clear to auscultation, abdomen is soft nontender.  Normal neuro exam with a an NIH scale of 0 and normal gait.  Labs essentially WNL.  Head CT with no acute findings.  EKG with no acute ischemic changes.  UA with no evidence of infection.  Patient received normal saline 1 L and Zofran 4 mg IV and is feeling much better and states her symptom have completely resolved.  Patient now with no complaints of feeling off balance or lightheaded and is tolerating p.o..                                  Clinical Impression:  Final diagnoses:  [R42] Lightheadedness  [K52.9] Gastroenteritis (Primary)          ED Disposition Condition    Discharge Stable          ED Prescriptions       Medication Sig Dispense Start Date End Date Auth. Provider    ondansetron (ZOFRAN) 4 MG tablet Take 1 tablet (4 mg total) by mouth every 6 (six) hours. 12 tablet 6/22/2025 -- Matthew Quezada MD          Follow-up Information       Follow up With Specialties Details Why Contact Info    Kaleb Barnett MD Family Medicine Schedule an appointment as soon as possible for a visit in 1 day  [facility] [place] [id number]  [phone]                     [1]   Social History  Tobacco Use    Smoking status: Never    Smokeless tobacco: Never   Substance Use Topics    Alcohol use: Not Currently     Comment: 1-2 times per month    Drug use: Never        Matthew Quezada MD  06/23/25 0606

## 2025-07-23 ENCOUNTER — PATIENT MESSAGE (OUTPATIENT)
Dept: HEMATOLOGY/ONCOLOGY | Facility: CLINIC | Age: 40
End: 2025-07-23
Payer: COMMERCIAL

## 2025-08-13 ENCOUNTER — LAB VISIT (OUTPATIENT)
Dept: LAB | Facility: HOSPITAL | Age: 40
End: 2025-08-13
Attending: INTERNAL MEDICINE
Payer: COMMERCIAL

## 2025-08-13 ENCOUNTER — INFUSION (OUTPATIENT)
Dept: INFUSION THERAPY | Facility: HOSPITAL | Age: 40
End: 2025-08-13
Attending: INTERNAL MEDICINE
Payer: COMMERCIAL

## 2025-08-13 DIAGNOSIS — C54.1 ENDOMETRIAL CANCER: ICD-10-CM

## 2025-08-13 DIAGNOSIS — Z95.828 PORT-A-CATH IN PLACE: Primary | ICD-10-CM

## 2025-08-13 DIAGNOSIS — C56.1 MALIGNANT NEOPLASM OF RIGHT OVARY: ICD-10-CM

## 2025-08-13 DIAGNOSIS — Z79.899 ENCOUNTER FOR LONG-TERM (CURRENT) DRUG USE: ICD-10-CM

## 2025-08-13 DIAGNOSIS — E86.0 DEHYDRATION: ICD-10-CM

## 2025-08-13 LAB
ALBUMIN SERPL-MCNC: 4 G/DL (ref 3.5–5)
ALBUMIN/GLOB SERPL: 1.2 RATIO (ref 1.1–2)
ALP SERPL-CCNC: 106 UNIT/L (ref 40–150)
ALT SERPL-CCNC: 42 UNIT/L (ref 0–55)
ANION GAP SERPL CALC-SCNC: 9 MEQ/L
AST SERPL-CCNC: 27 UNIT/L (ref 11–45)
BASOPHILS # BLD AUTO: 0.02 X10(3)/MCL
BASOPHILS NFR BLD AUTO: 0.2 %
BILIRUB SERPL-MCNC: 0.7 MG/DL
BUN SERPL-MCNC: 12 MG/DL (ref 7–18.7)
CALCIUM SERPL-MCNC: 9.3 MG/DL (ref 8.4–10.2)
CANCER AG125 SERPL-ACNC: 17 UNIT/ML (ref 0–35)
CHLORIDE SERPL-SCNC: 108 MMOL/L (ref 98–107)
CO2 SERPL-SCNC: 27 MMOL/L (ref 22–29)
CREAT SERPL-MCNC: 0.71 MG/DL (ref 0.55–1.02)
CREAT/UREA NIT SERPL: 17
EOSINOPHIL # BLD AUTO: 0.09 X10(3)/MCL (ref 0–0.9)
EOSINOPHIL NFR BLD AUTO: 1.1 %
ERYTHROCYTE [DISTWIDTH] IN BLOOD BY AUTOMATED COUNT: 12.1 % (ref 11.5–17)
GFR SERPLBLD CREATININE-BSD FMLA CKD-EPI: >60 ML/MIN/1.73/M2
GLOBULIN SER-MCNC: 3.4 GM/DL (ref 2.4–3.5)
GLUCOSE SERPL-MCNC: 125 MG/DL (ref 74–100)
HCT VFR BLD AUTO: 41.1 % (ref 37–47)
HGB BLD-MCNC: 13.6 G/DL (ref 12–16)
IMM GRANULOCYTES # BLD AUTO: 0.02 X10(3)/MCL (ref 0–0.04)
IMM GRANULOCYTES NFR BLD AUTO: 0.2 %
LYMPHOCYTES # BLD AUTO: 1.87 X10(3)/MCL (ref 0.6–4.6)
LYMPHOCYTES NFR BLD AUTO: 22.4 %
MAGNESIUM SERPL-MCNC: 2.2 MG/DL (ref 1.6–2.6)
MCH RBC QN AUTO: 30.5 PG (ref 27–31)
MCHC RBC AUTO-ENTMCNC: 33.1 G/DL (ref 33–36)
MCV RBC AUTO: 92.2 FL (ref 80–94)
MONOCYTES # BLD AUTO: 0.36 X10(3)/MCL (ref 0.1–1.3)
MONOCYTES NFR BLD AUTO: 4.3 %
NEUTROPHILS # BLD AUTO: 5.97 X10(3)/MCL (ref 2.1–9.2)
NEUTROPHILS NFR BLD AUTO: 71.8 %
PLATELET # BLD AUTO: 302 X10(3)/MCL (ref 130–400)
PMV BLD AUTO: 9.2 FL (ref 7.4–10.4)
POTASSIUM SERPL-SCNC: 3.8 MMOL/L (ref 3.5–5.1)
PROT SERPL-MCNC: 7.4 GM/DL (ref 6.4–8.3)
RBC # BLD AUTO: 4.46 X10(6)/MCL (ref 4.2–5.4)
SODIUM SERPL-SCNC: 144 MMOL/L (ref 136–145)
TSH SERPL-ACNC: 0.88 UIU/ML (ref 0.35–4.94)
WBC # BLD AUTO: 8.33 X10(3)/MCL (ref 4.5–11.5)

## 2025-08-13 PROCEDURE — 25000003 PHARM REV CODE 250: Performed by: INTERNAL MEDICINE

## 2025-08-13 PROCEDURE — 83735 ASSAY OF MAGNESIUM: CPT

## 2025-08-13 PROCEDURE — 86304 IMMUNOASSAY TUMOR CA 125: CPT

## 2025-08-13 PROCEDURE — 36415 COLL VENOUS BLD VENIPUNCTURE: CPT

## 2025-08-13 PROCEDURE — 63600175 PHARM REV CODE 636 W HCPCS: Performed by: INTERNAL MEDICINE

## 2025-08-13 PROCEDURE — 80053 COMPREHEN METABOLIC PANEL: CPT

## 2025-08-13 PROCEDURE — A4216 STERILE WATER/SALINE, 10 ML: HCPCS | Performed by: INTERNAL MEDICINE

## 2025-08-13 PROCEDURE — 84443 ASSAY THYROID STIM HORMONE: CPT

## 2025-08-13 PROCEDURE — 85025 COMPLETE CBC W/AUTO DIFF WBC: CPT

## 2025-08-13 PROCEDURE — 96523 IRRIG DRUG DELIVERY DEVICE: CPT

## 2025-08-13 RX ORDER — HEPARIN 100 UNIT/ML
500 SYRINGE INTRAVENOUS
Status: DISCONTINUED | OUTPATIENT
Start: 2025-08-13 | End: 2025-08-13 | Stop reason: HOSPADM

## 2025-08-13 RX ORDER — HEPARIN 100 UNIT/ML
500 SYRINGE INTRAVENOUS
OUTPATIENT
Start: 2025-08-13

## 2025-08-13 RX ORDER — SODIUM CHLORIDE 0.9 % (FLUSH) 0.9 %
10 SYRINGE (ML) INJECTION
OUTPATIENT
Start: 2025-08-13

## 2025-08-13 RX ORDER — SODIUM CHLORIDE 0.9 % (FLUSH) 0.9 %
10 SYRINGE (ML) INJECTION
Status: DISCONTINUED | OUTPATIENT
Start: 2025-08-13 | End: 2025-08-13 | Stop reason: HOSPADM

## 2025-08-13 RX ADMIN — HEPARIN 500 UNITS: 100 SYRINGE at 02:08

## 2025-08-13 RX ADMIN — Medication 10 ML: at 02:08

## 2025-08-20 ENCOUNTER — OFFICE VISIT (OUTPATIENT)
Dept: HEMATOLOGY/ONCOLOGY | Facility: CLINIC | Age: 40
End: 2025-08-20
Payer: COMMERCIAL

## 2025-08-20 VITALS
OXYGEN SATURATION: 96 % | HEIGHT: 62 IN | BODY MASS INDEX: 45.84 KG/M2 | SYSTOLIC BLOOD PRESSURE: 111 MMHG | HEART RATE: 91 BPM | WEIGHT: 249.13 LBS | DIASTOLIC BLOOD PRESSURE: 63 MMHG | RESPIRATION RATE: 18 BRPM

## 2025-08-20 DIAGNOSIS — C56.1 MALIGNANT NEOPLASM OF RIGHT OVARY: Primary | ICD-10-CM

## 2025-08-20 DIAGNOSIS — C78.01 MALIGNANT NEOPLASM METASTATIC TO RIGHT LUNG: ICD-10-CM

## 2025-08-20 PROCEDURE — 1160F RVW MEDS BY RX/DR IN RCRD: CPT | Mod: CPTII,S$GLB,, | Performed by: INTERNAL MEDICINE

## 2025-08-20 PROCEDURE — G2211 COMPLEX E/M VISIT ADD ON: HCPCS | Mod: S$GLB,,, | Performed by: INTERNAL MEDICINE

## 2025-08-20 PROCEDURE — 1159F MED LIST DOCD IN RCRD: CPT | Mod: CPTII,S$GLB,, | Performed by: INTERNAL MEDICINE

## 2025-08-20 PROCEDURE — 99999 PR PBB SHADOW E&M-EST. PATIENT-LVL IV: CPT | Mod: PBBFAC,,, | Performed by: INTERNAL MEDICINE

## 2025-08-20 PROCEDURE — 3074F SYST BP LT 130 MM HG: CPT | Mod: CPTII,S$GLB,, | Performed by: INTERNAL MEDICINE

## 2025-08-20 PROCEDURE — 3078F DIAST BP <80 MM HG: CPT | Mod: CPTII,S$GLB,, | Performed by: INTERNAL MEDICINE

## 2025-08-20 PROCEDURE — 99214 OFFICE O/P EST MOD 30 MIN: CPT | Mod: S$GLB,,, | Performed by: INTERNAL MEDICINE

## 2025-08-20 PROCEDURE — 3008F BODY MASS INDEX DOCD: CPT | Mod: CPTII,S$GLB,, | Performed by: INTERNAL MEDICINE

## 2025-08-20 RX ORDER — ACETAMINOPHEN 500 MG
5000 TABLET ORAL
COMMUNITY